# Patient Record
Sex: FEMALE | Race: WHITE | Employment: OTHER | URBAN - METROPOLITAN AREA
[De-identification: names, ages, dates, MRNs, and addresses within clinical notes are randomized per-mention and may not be internally consistent; named-entity substitution may affect disease eponyms.]

---

## 2017-01-16 ENCOUNTER — OFFICE VISIT (OUTPATIENT)
Dept: FAMILY MEDICINE CLINIC | Age: 72
End: 2017-01-16

## 2017-01-16 ENCOUNTER — TELEPHONE (OUTPATIENT)
Dept: FAMILY MEDICINE CLINIC | Age: 72
End: 2017-01-16

## 2017-01-16 VITALS
HEART RATE: 72 BPM | SYSTOLIC BLOOD PRESSURE: 104 MMHG | DIASTOLIC BLOOD PRESSURE: 60 MMHG | BODY MASS INDEX: 22.37 KG/M2 | WEIGHT: 138.6 LBS

## 2017-01-16 DIAGNOSIS — Z12.11 COLON CANCER SCREENING: ICD-10-CM

## 2017-01-16 DIAGNOSIS — M25.512 ACUTE PAIN OF LEFT SHOULDER: ICD-10-CM

## 2017-01-16 DIAGNOSIS — H91.92 DECREASED HEARING, LEFT: ICD-10-CM

## 2017-01-16 DIAGNOSIS — E11.9 CONTROLLED TYPE 2 DIABETES MELLITUS WITHOUT COMPLICATION, UNSPECIFIED LONG TERM INSULIN USE STATUS: Primary | ICD-10-CM

## 2017-01-16 DIAGNOSIS — R19.5 POSITIVE FIT (FECAL IMMUNOCHEMICAL TEST): ICD-10-CM

## 2017-01-16 DIAGNOSIS — E78.2 MIXED HYPERLIPIDEMIA: ICD-10-CM

## 2017-01-16 DIAGNOSIS — H90.5 SENSORINEURAL HEARING LOSS, UNSPECIFIED: Primary | ICD-10-CM

## 2017-01-16 DIAGNOSIS — E03.9 HYPOTHYROIDISM, UNSPECIFIED TYPE: ICD-10-CM

## 2017-01-16 LAB
ANION GAP SERPL CALCULATED.3IONS-SCNC: 18 MMOL/L (ref 3–16)
BUN BLDV-MCNC: 13 MG/DL (ref 7–20)
CALCIUM SERPL-MCNC: 10.2 MG/DL (ref 8.3–10.6)
CHLORIDE BLD-SCNC: 104 MMOL/L (ref 99–110)
CHOLESTEROL, TOTAL: 226 MG/DL (ref 0–199)
CO2: 25 MMOL/L (ref 21–32)
CREAT SERPL-MCNC: 0.7 MG/DL (ref 0.6–1.2)
CREATININE URINE: 93.6 MG/DL (ref 28–259)
GFR AFRICAN AMERICAN: >60
GFR NON-AFRICAN AMERICAN: >60
GLUCOSE BLD-MCNC: 138 MG/DL (ref 70–99)
HBA1C MFR BLD: 6.1 %
HDLC SERPL-MCNC: 70 MG/DL (ref 40–60)
LDL CHOLESTEROL CALCULATED: 118 MG/DL
MICROALBUMIN UR-MCNC: 1.8 MG/DL
MICROALBUMIN/CREAT UR-RTO: 19.2 MG/G (ref 0–30)
POTASSIUM SERPL-SCNC: 4.8 MMOL/L (ref 3.5–5.1)
SODIUM BLD-SCNC: 147 MMOL/L (ref 136–145)
T4 FREE: 1.1 NG/DL (ref 0.9–1.8)
TRIGL SERPL-MCNC: 189 MG/DL (ref 0–150)
TSH SERPL DL<=0.05 MIU/L-ACNC: 1.46 UIU/ML (ref 0.27–4.2)
VLDLC SERPL CALC-MCNC: 38 MG/DL

## 2017-01-16 PROCEDURE — 36415 COLL VENOUS BLD VENIPUNCTURE: CPT | Performed by: FAMILY MEDICINE

## 2017-01-16 PROCEDURE — 83036 HEMOGLOBIN GLYCOSYLATED A1C: CPT | Performed by: FAMILY MEDICINE

## 2017-01-16 PROCEDURE — 99214 OFFICE O/P EST MOD 30 MIN: CPT | Performed by: FAMILY MEDICINE

## 2017-01-16 ASSESSMENT — ENCOUNTER SYMPTOMS
SHORTNESS OF BREATH: 0
CONSTIPATION: 0
COUGH: 0
NAUSEA: 0
DIARRHEA: 0
ABDOMINAL PAIN: 0
EYE PAIN: 0

## 2017-01-30 LAB
CONTROL: ABNORMAL
HEMOCCULT STL QL: POSITIVE

## 2017-01-30 PROCEDURE — 82274 ASSAY TEST FOR BLOOD FECAL: CPT | Performed by: FAMILY MEDICINE

## 2017-02-10 ENCOUNTER — TELEPHONE (OUTPATIENT)
Dept: FAMILY MEDICINE CLINIC | Age: 72
End: 2017-02-10

## 2017-02-21 ENCOUNTER — HOSPITAL ENCOUNTER (OUTPATIENT)
Dept: SURGERY | Age: 72
Discharge: HOME | End: 2017-02-21
Attending: INTERNAL MEDICINE | Admitting: INTERNAL MEDICINE

## 2017-02-21 VITALS
OXYGEN SATURATION: 99 % | DIASTOLIC BLOOD PRESSURE: 74 MMHG | TEMPERATURE: 97.1 F | WEIGHT: 130 LBS | RESPIRATION RATE: 16 BRPM | HEIGHT: 66 IN | HEART RATE: 72 BPM | SYSTOLIC BLOOD PRESSURE: 116 MMHG | BODY MASS INDEX: 20.89 KG/M2

## 2017-02-21 LAB
GLUCOSE BLD-MCNC: 116 MG/DL (ref 70–99)
PERFORMED ON: ABNORMAL

## 2017-02-21 RX ORDER — SODIUM CHLORIDE, SODIUM LACTATE, POTASSIUM CHLORIDE, CALCIUM CHLORIDE 600; 310; 30; 20 MG/100ML; MG/100ML; MG/100ML; MG/100ML
INJECTION, SOLUTION INTRAVENOUS CONTINUOUS
Status: DISCONTINUED | OUTPATIENT
Start: 2017-02-21 | End: 2017-02-22 | Stop reason: HOSPADM

## 2017-02-21 RX ORDER — LIDOCAINE HYDROCHLORIDE 10 MG/ML
0.1 INJECTION, SOLUTION EPIDURAL; INFILTRATION; INTRACAUDAL; PERINEURAL ONCE
Status: DISCONTINUED | OUTPATIENT
Start: 2017-02-21 | End: 2017-02-22 | Stop reason: HOSPADM

## 2017-02-21 ASSESSMENT — PAIN SCALES - GENERAL
PAINLEVEL_OUTOF10: 0
PAINLEVEL_OUTOF10: 0

## 2017-02-21 ASSESSMENT — PAIN - FUNCTIONAL ASSESSMENT: PAIN_FUNCTIONAL_ASSESSMENT: 0-10

## 2017-03-01 LAB — DIABETIC RETINOPATHY: NORMAL

## 2017-03-15 DIAGNOSIS — E11.9 CONTROLLED TYPE 2 DIABETES MELLITUS WITHOUT COMPLICATION, UNSPECIFIED LONG TERM INSULIN USE STATUS: ICD-10-CM

## 2017-03-15 DIAGNOSIS — E78.2 MIXED HYPERLIPIDEMIA: ICD-10-CM

## 2017-03-15 DIAGNOSIS — F32.A DEPRESSION, UNSPECIFIED DEPRESSION TYPE: ICD-10-CM

## 2017-03-17 RX ORDER — SIMVASTATIN 40 MG
TABLET ORAL
Qty: 90 TABLET | Refills: 1 | Status: SHIPPED | OUTPATIENT
Start: 2017-03-17 | End: 2017-09-21 | Stop reason: SDUPTHER

## 2017-03-17 RX ORDER — SERTRALINE HYDROCHLORIDE 100 MG/1
TABLET, FILM COATED ORAL
Qty: 180 TABLET | Refills: 1 | Status: SHIPPED | OUTPATIENT
Start: 2017-03-17 | End: 2017-12-07 | Stop reason: SDUPTHER

## 2017-03-17 RX ORDER — LISINOPRIL 2.5 MG/1
TABLET ORAL
Qty: 90 TABLET | Refills: 1 | Status: SHIPPED | OUTPATIENT
Start: 2017-03-17 | End: 2017-09-21 | Stop reason: SDUPTHER

## 2017-05-09 ENCOUNTER — OFFICE VISIT (OUTPATIENT)
Dept: FAMILY MEDICINE CLINIC | Age: 72
End: 2017-05-09

## 2017-05-09 VITALS
DIASTOLIC BLOOD PRESSURE: 68 MMHG | WEIGHT: 133 LBS | HEART RATE: 76 BPM | SYSTOLIC BLOOD PRESSURE: 130 MMHG | BODY MASS INDEX: 21.47 KG/M2

## 2017-05-09 DIAGNOSIS — E11.9 CONTROLLED TYPE 2 DIABETES MELLITUS WITHOUT COMPLICATION, UNSPECIFIED LONG TERM INSULIN USE STATUS: Primary | ICD-10-CM

## 2017-05-09 DIAGNOSIS — F32.A DEPRESSION, UNSPECIFIED DEPRESSION TYPE: ICD-10-CM

## 2017-05-09 DIAGNOSIS — E78.2 MIXED HYPERLIPIDEMIA: ICD-10-CM

## 2017-05-09 DIAGNOSIS — E03.9 HYPOTHYROIDISM, UNSPECIFIED TYPE: ICD-10-CM

## 2017-05-09 LAB
ANION GAP SERPL CALCULATED.3IONS-SCNC: 18 MMOL/L (ref 3–16)
BUN BLDV-MCNC: 12 MG/DL (ref 7–20)
CALCIUM SERPL-MCNC: 9.3 MG/DL (ref 8.3–10.6)
CHLORIDE BLD-SCNC: 101 MMOL/L (ref 99–110)
CHOLESTEROL, TOTAL: 241 MG/DL (ref 0–199)
CO2: 24 MMOL/L (ref 21–32)
CREAT SERPL-MCNC: 0.6 MG/DL (ref 0.6–1.2)
GFR AFRICAN AMERICAN: >60
GFR NON-AFRICAN AMERICAN: >60
GLUCOSE BLD-MCNC: 147 MG/DL (ref 70–99)
HBA1C MFR BLD: 6.2 %
HDLC SERPL-MCNC: 75 MG/DL (ref 40–60)
LDL CHOLESTEROL CALCULATED: 145 MG/DL
POTASSIUM SERPL-SCNC: 4 MMOL/L (ref 3.5–5.1)
SODIUM BLD-SCNC: 143 MMOL/L (ref 136–145)
T4 FREE: 1.1 NG/DL (ref 0.9–1.8)
TRIGL SERPL-MCNC: 103 MG/DL (ref 0–150)
TSH SERPL DL<=0.05 MIU/L-ACNC: 1.77 UIU/ML (ref 0.27–4.2)
VLDLC SERPL CALC-MCNC: 21 MG/DL

## 2017-05-09 PROCEDURE — 83036 HEMOGLOBIN GLYCOSYLATED A1C: CPT | Performed by: FAMILY MEDICINE

## 2017-05-09 PROCEDURE — 36415 COLL VENOUS BLD VENIPUNCTURE: CPT | Performed by: FAMILY MEDICINE

## 2017-05-09 PROCEDURE — 99213 OFFICE O/P EST LOW 20 MIN: CPT | Performed by: FAMILY MEDICINE

## 2017-05-09 ASSESSMENT — PATIENT HEALTH QUESTIONNAIRE - PHQ9
SUM OF ALL RESPONSES TO PHQ9 QUESTIONS 1 & 2: 0
1. LITTLE INTEREST OR PLEASURE IN DOING THINGS: 0
2. FEELING DOWN, DEPRESSED OR HOPELESS: 0
SUM OF ALL RESPONSES TO PHQ QUESTIONS 1-9: 0

## 2017-05-30 ASSESSMENT — ENCOUNTER SYMPTOMS
ABDOMINAL PAIN: 0
SHORTNESS OF BREATH: 0

## 2017-06-08 DIAGNOSIS — E11.9 CONTROLLED TYPE 2 DIABETES MELLITUS WITHOUT COMPLICATION, UNSPECIFIED LONG TERM INSULIN USE STATUS: ICD-10-CM

## 2017-06-08 RX ORDER — METFORMIN HYDROCHLORIDE 500 MG/1
1000 TABLET, EXTENDED RELEASE ORAL 2 TIMES DAILY WITH MEALS
Qty: 360 TABLET | Refills: 1 | Status: SHIPPED | OUTPATIENT
Start: 2017-06-08 | End: 2017-12-07 | Stop reason: SDUPTHER

## 2017-06-21 DIAGNOSIS — M85.80 OSTEOPENIA: Primary | ICD-10-CM

## 2017-08-04 ENCOUNTER — TELEPHONE (OUTPATIENT)
Dept: FAMILY MEDICINE CLINIC | Age: 72
End: 2017-08-04

## 2017-08-15 ENCOUNTER — HOSPITAL ENCOUNTER (OUTPATIENT)
Dept: MAMMOGRAPHY | Age: 72
Discharge: HOME | End: 2017-08-15
Attending: INTERNAL MEDICINE | Admitting: INTERNAL MEDICINE

## 2017-08-15 DIAGNOSIS — C50.411 BREAST CANCER OF UPPER-OUTER QUADRANT OF RIGHT FEMALE BREAST (HCC): ICD-10-CM

## 2017-09-21 DIAGNOSIS — E11.9 CONTROLLED TYPE 2 DIABETES MELLITUS WITHOUT COMPLICATION, UNSPECIFIED LONG TERM INSULIN USE STATUS: ICD-10-CM

## 2017-09-21 DIAGNOSIS — E78.2 MIXED HYPERLIPIDEMIA: ICD-10-CM

## 2017-09-25 DIAGNOSIS — E03.9 HYPOTHYROIDISM, UNSPECIFIED TYPE: ICD-10-CM

## 2017-09-25 RX ORDER — SIMVASTATIN 40 MG
TABLET ORAL
Qty: 90 TABLET | Refills: 1 | Status: SHIPPED | OUTPATIENT
Start: 2017-09-25 | End: 2018-02-26 | Stop reason: SDUPTHER

## 2017-09-25 RX ORDER — LISINOPRIL 2.5 MG/1
TABLET ORAL
Qty: 90 TABLET | Refills: 1 | Status: SHIPPED | OUTPATIENT
Start: 2017-09-25 | End: 2018-02-26 | Stop reason: SDUPTHER

## 2017-09-28 RX ORDER — LEVOTHYROXINE SODIUM 0.1 MG/1
100 TABLET ORAL DAILY
Qty: 90 TABLET | Refills: 3 | Status: SHIPPED | OUTPATIENT
Start: 2017-09-28 | End: 2018-05-10 | Stop reason: SDUPTHER

## 2017-09-29 ENCOUNTER — OFFICE VISIT (OUTPATIENT)
Dept: ORTHOPEDIC SURGERY | Age: 72
End: 2017-09-29

## 2017-09-29 VITALS — BODY MASS INDEX: 20.09 KG/M2 | HEIGHT: 66 IN | WEIGHT: 125 LBS

## 2017-09-29 DIAGNOSIS — M79.641 HAND PAIN, RIGHT: Primary | ICD-10-CM

## 2017-09-29 DIAGNOSIS — S62.309A: ICD-10-CM

## 2017-09-29 PROCEDURE — 99213 OFFICE O/P EST LOW 20 MIN: CPT | Performed by: PHYSICIAN ASSISTANT

## 2017-10-02 ENCOUNTER — HOSPITAL ENCOUNTER (OUTPATIENT)
Dept: OCCUPATIONAL THERAPY | Age: 72
Discharge: HOME | End: 2017-10-31
Admitting: ORTHOPAEDIC SURGERY

## 2017-10-02 ENCOUNTER — OFFICE VISIT (OUTPATIENT)
Dept: ORTHOPEDIC SURGERY | Age: 72
End: 2017-10-02

## 2017-10-02 VITALS — WEIGHT: 125 LBS | BODY MASS INDEX: 20.09 KG/M2 | HEIGHT: 66 IN

## 2017-10-02 DIAGNOSIS — S62.336A CLOSED DISPLACED FRACTURE OF NECK OF FIFTH METACARPAL BONE OF RIGHT HAND, INITIAL ENCOUNTER: ICD-10-CM

## 2017-10-02 DIAGNOSIS — S62.619A AVULSION FRACTURE OF PROXIMAL PHALANX OF FINGER, CLOSED, INITIAL ENCOUNTER: ICD-10-CM

## 2017-10-02 DIAGNOSIS — S62.354A CLOSED NONDISPLACED FRACTURE OF SHAFT OF FOURTH METACARPAL BONE OF RIGHT HAND, INITIAL ENCOUNTER: Primary | ICD-10-CM

## 2017-10-02 PROCEDURE — 99213 OFFICE O/P EST LOW 20 MIN: CPT | Performed by: ORTHOPAEDIC SURGERY

## 2017-10-02 NOTE — PROGRESS NOTES
Assessment: New injury to the right hand with an evulsion fracture at the insertion of the radial collateral ligament of the right middle MP joint. #2 oblique fracture of the 4th metacarpal with slight shortening but no rotational deformity. #3 evulsion fracture the origin of the ulnar collateral ligament of the small finger MP joint    Treatment Plan: I think these fracture should heal nonoperatively. We are going to have therapy fabricate a forearm-based safe position splint for middle ring and small fingers. She may remove it for hygiene and gentle range of motion exercises we will follow-up in 2 weeks with repeat x-rays    Return in about 2 weeks (around 10/16/2017) for X-ray next visit. History of Present Illness  Jennifer Bello is a 71 y.o. female. Patient's very nice lady who fell down the stairs last week injuring her right hand. She came back into the office on Friday to have her splint rewrapped she had a fair amount of swelling and felt like her's dressing was too tight on her fingers. Review of Systems  Complete Review of Systems performed and is non-contributory except for what is noted in HPI. Vital Signs  Vitals:    10/02/17 1324   Weight: 125 lb (56.7 kg)   Height: 5' 6.14\" (1.68 m)     Body mass index is 20.09 kg/(m^2). Physical Exam  Constitutional:  Patient is well-nourished and demonstrates normal hygiene. Mental Status:  Patient is alert and oriented to person, place and time. Skin:  Intact, no rashes or lesions. Hand Examination: She has definite ecchymosis and swelling from the middle finger MP joint over to the ulnar side of the hand. She can only flex to about 40° at middle ring and small MP joints but there is no evident rotational deformity and the plane of the nail plates appears to be normal.    Neurologic exam shows no significant numbness or tingling. Vascular exam shows bruising but good capillary refill.     Additional Comments:     Additional

## 2017-10-02 NOTE — PLAN OF CARE
[facility]  Orthopaedics and Sports Rehabilitation, [facility]  ________________________________________________________________________    Patient: Agueda Kebede   :   MRN: [id number]  Referring Physician: Referring Practitioner: Herson Browne MD    Evaluation Date: 10/2/2017      Medical Diagnosis Information:  Diagnosis: O25.837L (ICD-10-CM) - Closed nondisplaced fracture of shaft of fourth metacarpal bone of right hand, S62.336A (ICD-10-CM) - Closed displaced fracture of neck of fifth metacarpal bone of right hand           Occupational Therapy Splint Certification Form  Dear Referring Practitioner: Herson Browne MD,  The following patient has been evaluated for occupational therapy services for fabrication of a R ulnar gutter splint. Insurance requires the referring physician to review the treatment plan. Please review the attached evaluation and/or summary of the patient's plan of care, and verify that you agree by signing the attached document and sending it back to our office. Plan of Care/Treatment to date:  [x] Fabrication of custom R ulnar gutter splint        [x] Instruction on splint use, care and wearing schedule        [x] Follow up as needed for splint modifications          Frequency/Duration:  [x] One time visit for splint fabrication and instructions. Follow up as needed for splint modifcations        [] Splint fabricated, patient to return for full evaluation.     Rehab Potential: [x] good [] fair  [] poor         SPLINT EVALUATION  Date: 10/2/2017  Name: Agueda Kebede            : 9/15/1907      Medical/Treatment Diagnosis Information:  Diagnosis: S62.354A (ICD-10-CM) - Closed nondisplaced fracture of shaft of fourth metacarpal bone of right hand, S62.336A (ICD-10-CM) - Closed displaced fracture of neck of fifth metacarpal bone of right hand     Insurance/Certification information:  OT Insurance Information: [facility] Medicare  Physician Information:  Referring Practitioner: Flako Fu MD       Next MD Appointment: 2 weeks    Subjective  History of Injury/ Mechanism of Injury: fell down the stairs on 9/28/17; seen by MD this afternoon and splint ordered   Surgery Date: NA  Dominant Hand:    [] Right [x]Left  Occupational/Vocational Status: retired   Progress of any previous OT/PT: the patient []has/ [x]has not received OT/PT previously for this diagnosis. Pain: 3-4/10    Objective Findings: Moderate edema R hand   Type of splint:   R ulnar gutter splint, IP's free (LF-SF)  Splint protocol utilization:   Full time  Splint Purpose: [x]Immobilize or protect [x]Promote healing of bone    [x]Relieve pain  []Provide support for improved hand function []Maximize joint motion    Treatment:   [x]Splint provided ([x]Customized/ []Prefabricated), and splint rationale explained. [x]Patient instructed in [x]wear/ [x]care of splint and educated regarding diagnosis. [x]Patient instructed in symptom reduction techniques   []HEP instruction    []Discussed ADL assistive device    Written Information Distributed: []HEP  [x]Splint care and wearing protocol    Patient response to evaluation and instructions:  [x]Attentive/interested   [x]Asked questions/ retained info  []Appeared disinterested  []Poor retention of information  []Appeared anxious/ fearful    Assessment and Plan:  Goals: [x]Patient will be able to verbalize rationale for, and demonstrate proper wearing     of splint. [x]Splint will provide proper fit and function. [x]Patient will be able to verbalize 2-3 ways to prevent further symptoms. []Patient will be able to don and doff independently. []Patient will be independent with HEP    Goals met:  [x]yes []no    Plan:  [x]Splint completed with good fit and function. Hand Therapy to follow up for     splint modifications as needed    []Splint completed; OT/PT evaluation initiated. Patient to return for further     treatment.     Mayra Hutson  OTR/L, PT, MPT, [address] [place] [address], [id number]

## 2017-10-03 NOTE — PROGRESS NOTES
Patient: León Jason    MRN: [id number]  YOB: 1945          Age: 71 y.o. Sex: female    Subjective     Chief Complaint:  Hand Pain (right hand)      History of Present Illness:  León Jason is a 71 y.o. female who presents tonight with her daughter for evaluation of right hand pain. Patient states that she fell down 10 carpeted steps landing onto her right hand on 9/28/17 landing impacting her right hand. She was seen at [facility] emergency room yesterday where x-rays were obtained that showed fractures of her 4th and 5th metacarpal.  Patient was placed into an ulnar gutter splint and referred to Dr. Laureen Saavedra. Patient presents to the clinic tonight complaining of increased pain within the splint. Medical History  Current Medications:   Current Outpatient Prescriptions   Medication Sig Dispense Refill    levothyroxine (SYNTHROID) 100 MCG tablet Take 1 tablet by mouth daily (or as otherwise directed). 90 tablet 3    HYDROcodone-acetaminophen (NORCO) 5-325 MG per tablet Take 1 tablet by mouth every 6 hours as needed for Pain . 10 tablet 0    simvastatin (ZOCOR) 40 MG tablet TAKE 1 TABLET BY MOUTH NIGHTLY 90 tablet 1    lisinopril (PRINIVIL;ZESTRIL) 2.5 MG tablet TAKE 1 TABLET BY MOUTH DAILY 90 tablet 1    metFORMIN (GLUCOPHAGE-XR) 500 MG extended release tablet Take 2 tablets by mouth 2 times daily (with meals) 360 tablet 1    anastrozole (ARIMIDEX) 1 MG tablet Take 1 tablet by mouth daily 90 tablet 3    sertraline (ZOLOFT) 100 MG tablet TAKE 2 TABLETS BY MOUTH EVERY DAY (OR AS DIRECTED). 180 tablet 1    IRON PO Take by mouth daily      MAGNESIUM PO Take by mouth daily      Probiotic Product (ALIGN) 4 MG CAPS   Take 4 mg by mouth Indications: as needed       polyethylene glycol (MIRALAX) PACK packet   Take 17 g by mouth as needed       multivitamin (THERAGRAN) per tablet Take 1 tablet by mouth daily.       famotidine (PEPCID) 20 MG tablet Take 1 tablet by mouth 2 times daily for 14 doses. 14 tablet 0    loratadine (CLARITIN) 10 MG tablet   Take 10 mg by mouth daily Indications: as needed       glucose blood VI test strips (ASCENSIA AUTODISC VI;ONE TOUCH ULTRA TEST VI) strip As needed. Testing qd 100 strip 3    aspirin 81 MG EC tablet Take 81 mg by mouth daily. No current facility-administered medications for this visit. Medical History:   Past Medical History:   Diagnosis Date    Allergic rhinitis     Cancer ([facility] [address] 11/2013    right breast    Depression     Hyperlipidemia     Hypertension     Hypothyroidism     Type II or unspecified type diabetes mellitus without mention of complication, not stated as uncontrolled     Unspecified sprain of left wrist, initial encounter 9/9/2016     Allergies: Allergies   Allergen Reactions    Ciprofloxacin Nausea Only    Naprosyn [Naproxen]      lymphadenopathy    Paxil [Paroxetine] Hives    Pravachol [Pravastatin Sodium]      Neck,shoulder pain    Prozac [Fluoxetine Hcl]      Headaches      Wellbutrin [Bupropion Hcl]      Tinnitus/ \"laser tag in brain\"    Amoxicillin Rash    Ampicillin Rash    Lipitor Rash    Sulfa Antibiotics Rash     Problem List:    Patient Active Problem List   Diagnosis    Diabetes mellitus type II, controlled ([facility] [address]    HLD (hyperlipidemia)    Depression    Hypothyroid    Breast cancer of upper-outer quadrant of right female breast ([facility] [address]    Closed fracture of right distal fibula    Elbow pain    Lateral epicondylitis (tennis elbow)    Pain in joint, upper arm    Lateral epicondylitis    Cervical radicular pain    Shoulder pain    Unspecified sprain of left wrist, initial encounter       Review of Systems:  All systems were reviewed on 9/29/2017 and were negative except as indicated on the ROS form attached to this encounter (or located in the Media tab).     Vital Signs:  Ht 5' 6.14\" (1.68 m)  Wt 125 lb (56.7 kg)  LMP 02/03/1997  BMI 20.09 kg/m2    General Exam:  Constitutional: Patient is adequately groomed with no evidence of malnutrition  Mental Status: The patient is oriented to time, place and person. The patient's mood and affect are appropriate. Neurological: The patient has good coordination. There is no weakness or sensory deficit. Right hand Examination:   Inspection: Today the ulnar gutter splint was removed and the skin was noted to be intact with mild distal edema into her fingers. Palpation: She is diffusely tender to palpation over the distal 4th and 5th metacarpal    Range of motion: All fingers have decreased range of motion but good capillary refill    Strength: There is no significant strength deficits noted upon testing    Special tests: Capillary refills within 2 seconds    Skin: There are no rashes, ulcerations or lesions      Radiology:  X-rays obtained and reviewed in office: X-rays from [facility] emergency room were reviewed that shows spiral fracture of the 5th metacarpal fracture with a slight displaced fracture of the 4th metacarpal.  Views:   Location(s):   Impression:     Assessment:  4th and 5th metacarpal fractures right hand    Impression:   Encounter Diagnoses   Name Primary?  Hand pain, right Yes    Multiple fractures of metacarpal bones, closed, initial encounter        Office Procedures:  No orders of the defined types were placed in this encounter. Treatment Plan:  Patient was already in a ulnar gutter splint that was taken off and rewrap. Patient is to continue icing and she may continue with the sling. She will follow-up next week with Dr. Jennifer Marquez. Zohaib Brown PA-C    * Please note that some or all of this record was generated using voice recognition software. If there are any questions about the content of this document, please contact me as some errors in transcription may have occurred.

## 2017-10-16 ENCOUNTER — OFFICE VISIT (OUTPATIENT)
Dept: ORTHOPEDIC SURGERY | Age: 72
End: 2017-10-16

## 2017-10-16 DIAGNOSIS — S62.336D CLOSED DISPLACED FRACTURE OF NECK OF FIFTH METACARPAL BONE OF RIGHT HAND WITH ROUTINE HEALING, SUBSEQUENT ENCOUNTER: ICD-10-CM

## 2017-10-16 DIAGNOSIS — S62.354D CLOSED NONDISPLACED FRACTURE OF SHAFT OF FOURTH METACARPAL BONE OF RIGHT HAND WITH ROUTINE HEALING, SUBSEQUENT ENCOUNTER: ICD-10-CM

## 2017-10-16 DIAGNOSIS — R52 PAIN: Primary | ICD-10-CM

## 2017-10-16 DIAGNOSIS — S62.619D CLOSED AVULSION FRACTURE OF PROXIMAL PHALANX OF FINGER WITH ROUTINE HEALING, SUBSEQUENT ENCOUNTER: ICD-10-CM

## 2017-10-16 PROCEDURE — 73130 X-RAY EXAM OF HAND: CPT | Performed by: ORTHOPAEDIC SURGERY

## 2017-10-16 PROCEDURE — 99213 OFFICE O/P EST LOW 20 MIN: CPT | Performed by: ORTHOPAEDIC SURGERY

## 2017-10-16 NOTE — PROGRESS NOTES
Assessment: Evulsion fracture of the insertion radial collateral ligament middle finger MP joint, oblique fracture 4th metacarpal, evulsion fracture origin ulnar collateral ligament right small finger MP joint. Patient is 2-1/2 weeks post injury or fractures remain in good position    Treatment Plan: I instructed her on gentle home range of motion exercises 2 or 3 times a day. She is going to continue using her safe position splint in between exercises    Return in about 2 weeks (around 10/30/2017) for X-ray next visit. History of Present Illness  Agueda Kebede is a 71 y.o. female. Very nice lady used 2-1/2 weeks post the above listed injuries. She has been in an Orthoplast splint and is overall doing very well with minimal discomfort    Review of Systems  Complete Review of Systems performed and is non-contributory except for what is noted in HPI. Vital Signs  There were no vitals filed for this visit. There is no height or weight on file to calculate BMI. Physical Exam  Constitutional:  Patient is well-nourished and demonstrates normal hygiene. Mental Status:  Patient is alert and oriented to person, place and time. Skin:  Intact, no rashes or lesions. Hand Examination: Range of motion of her fingers shows about 5° lack of full extension and she can flex to about 70° at the MP joints of middle ring and small fingers. Minimal shortening of the ring finger. No rotational deformities. .    Vascular exam shows normal capillary refill. Neurologic exam negative Tinel's and Phalen's no significant numbness or tingling. Additional Comments:     Additional Examinations:  X-Ray Findings:  PA lateral and oblique x-rays of the right hand show congruent alignment of the metacarpal phalangeal joints.   The 2 evulsion fractures remain in good position and the oblique fracture the metacarpal appears to be healing well  Additional Diagnostic Test Findings:    Office Procedures:    Orders Placed This Encounter   Procedures    XR HAND RIGHT (MIN 3 VIEWS)     [id number]     Order Specific Question:   Reason for exam:     Answer:   Hand Pain

## 2017-10-26 ENCOUNTER — OFFICE VISIT (OUTPATIENT)
Dept: ORTHOPEDIC SURGERY | Age: 72
End: 2017-10-26

## 2017-10-26 VITALS
HEIGHT: 66 IN | BODY MASS INDEX: 20.09 KG/M2 | HEART RATE: 95 BPM | DIASTOLIC BLOOD PRESSURE: 78 MMHG | WEIGHT: 125 LBS | SYSTOLIC BLOOD PRESSURE: 126 MMHG

## 2017-10-26 DIAGNOSIS — S42.291A OTHER CLOSED DISPLACED FRACTURE OF PROXIMAL END OF RIGHT HUMERUS, INITIAL ENCOUNTER: ICD-10-CM

## 2017-10-26 DIAGNOSIS — M25.511 RIGHT SHOULDER PAIN, UNSPECIFIED CHRONICITY: Primary | ICD-10-CM

## 2017-10-26 PROCEDURE — 3017F COLORECTAL CA SCREEN DOC REV: CPT | Performed by: ORTHOPAEDIC SURGERY

## 2017-10-26 PROCEDURE — 99214 OFFICE O/P EST MOD 30 MIN: CPT | Performed by: ORTHOPAEDIC SURGERY

## 2017-10-26 PROCEDURE — G8420 CALC BMI NORM PARAMETERS: HCPCS | Performed by: ORTHOPAEDIC SURGERY

## 2017-10-26 PROCEDURE — 1123F ACP DISCUSS/DSCN MKR DOCD: CPT | Performed by: ORTHOPAEDIC SURGERY

## 2017-10-26 PROCEDURE — 1090F PRES/ABSN URINE INCON ASSESS: CPT | Performed by: ORTHOPAEDIC SURGERY

## 2017-10-26 PROCEDURE — G8399 PT W/DXA RESULTS DOCUMENT: HCPCS | Performed by: ORTHOPAEDIC SURGERY

## 2017-10-26 PROCEDURE — L3670 SO ACRO/CLAV CAN WEB PRE OTS: HCPCS | Performed by: ORTHOPAEDIC SURGERY

## 2017-10-26 PROCEDURE — 1036F TOBACCO NON-USER: CPT | Performed by: ORTHOPAEDIC SURGERY

## 2017-10-26 PROCEDURE — G8484 FLU IMMUNIZE NO ADMIN: HCPCS | Performed by: ORTHOPAEDIC SURGERY

## 2017-10-26 PROCEDURE — 4040F PNEUMOC VAC/ADMIN/RCVD: CPT | Performed by: ORTHOPAEDIC SURGERY

## 2017-10-26 PROCEDURE — 3014F SCREEN MAMMO DOC REV: CPT | Performed by: ORTHOPAEDIC SURGERY

## 2017-10-26 PROCEDURE — G8427 DOCREV CUR MEDS BY ELIG CLIN: HCPCS | Performed by: ORTHOPAEDIC SURGERY

## 2017-10-26 NOTE — PROGRESS NOTES
Chief Complaint  New Patient (Follow up from ER for Rt Humerus Fracture 10/23/17) and Hand Pain (Previous injury she is seeing Dr. Kentrell Finch fell and has broken shaft of 4th metacarpal of right hand )      History of Present Illness:  Viridiana Mace is a 71 y.o. y/o female who presents today for evaluation of her right shoulder. On 10/23/17 she fell and sustained injury to her right shoulder. She went to the emergency department where she was evaluated and had a right proximal humerus fracture. She was placed in a sling and told to follow-up with orthopedics. She denies previous injury to this shoulder or surgery on the shoulder. She denies numbness and tingling. Of note she is recently been treated for a fourth metacarpal shaft fracture. She states he's having pain as radiating from her shoulder down her elbow, but no pain in the elbow itself. She denies new numbness and tingling. She has been taking narcotic medications that she has prescriptions for does not need a refill today. Occupation/activities: Not currently working    Medical History  Past Medical History:   Diagnosis Date    Allergic rhinitis     Cancer ([facility] [address] 11/2013    right breast    Depression     Hyperlipidemia     Hypertension     Hypothyroidism     Type II or unspecified type diabetes mellitus without mention of complication, not stated as uncontrolled     Unspecified sprain of left wrist, initial encounter 9/9/2016       Past Surgical History:   Procedure Laterality Date    BREAST SURGERY Right 12/9/2013    lumpectomy with sentinel node biopsy    COLONOSCOPY  02/21/2017    polyp, diverticulosis    THYROIDECTOMY  9/2004    thyroid CA  (papillary) - Dr. Sofia Green History     Social History    Marital status:      Spouse name: N/A    Number of children: N/A    Years of education: N/A     Occupational History    Not on file.      Social History Main Topics    Smoking status: Former Smoker     Packs/day: 1.50     Years: 10.00     Quit date: 1/1/1986    Smokeless tobacco: Never Used      Comment: Quit 2 times total    Alcohol use 0.0 - 1.2 oz/week      Comment: occas - 1-4/mo    Drug use: No    Sexual activity: Not Currently     Other Topics Concern    Not on file     Social History Narrative    No narrative on file       Family History   Problem Relation Age of Onset    Diabetes Daughter 5     FLORENTINO (Type I)    Cancer Daughter     Cancer Father         Review of Systems  Pertinent items are noted in HPI  Review of systems reviewed from Patient History Form dated on 10/26/17 and available in the patient's chart under the Media tab. Vital Signs  Vitals:    10/26/17 0821   BP: 126/78   Pulse: 95       General Exam:   Constitutional: Patient is adequately groomed with no evidence of malnutrition  Mental Status: The patient is oriented to time, place and person. The patient's mood and affect are appropriate. Lymphatic: The lymphatic examination bilaterally reveals all areas to be without enlargement or induration. Neurological: The patient has good coordination. There is no weakness or sensory deficit. Gait: normal    Right upper extremity Examination  Inspection:  No bruising or skin lesions    Palpation:  Tenderness globally around the shoulder, no tenderness over the clavicle or tenderness at the elbow    Range of Motion:  Elbow range of motion causes pain in the shoulder, but no elbow pain, shoulder range of motion not assessed    Sensation: In tact to light touch all nerve distributions     Strength: Motor exam appears intact C5 to T1, deltoid nerve appears to be working however limited exam secondary to pain    Special Tests:  None    Skin: There are no additional worrisome rashes, ulcerations or lesions. Circulation normal    Additional Examinations:  Left Upper Extremity: Examination of the left upper extremity does not show any tenderness, deformity or injury.   Range

## 2017-10-30 ENCOUNTER — OFFICE VISIT (OUTPATIENT)
Dept: ORTHOPEDIC SURGERY | Age: 72
End: 2017-10-30

## 2017-10-30 VITALS — HEIGHT: 66 IN | BODY MASS INDEX: 20.09 KG/M2 | WEIGHT: 125 LBS

## 2017-10-30 DIAGNOSIS — M79.641 PAIN OF RIGHT HAND: Primary | ICD-10-CM

## 2017-10-30 PROCEDURE — 4040F PNEUMOC VAC/ADMIN/RCVD: CPT | Performed by: ORTHOPAEDIC SURGERY

## 2017-10-30 PROCEDURE — G8420 CALC BMI NORM PARAMETERS: HCPCS | Performed by: ORTHOPAEDIC SURGERY

## 2017-10-30 PROCEDURE — G8428 CUR MEDS NOT DOCUMENT: HCPCS | Performed by: ORTHOPAEDIC SURGERY

## 2017-10-30 PROCEDURE — 99213 OFFICE O/P EST LOW 20 MIN: CPT | Performed by: ORTHOPAEDIC SURGERY

## 2017-10-30 PROCEDURE — 3014F SCREEN MAMMO DOC REV: CPT | Performed by: ORTHOPAEDIC SURGERY

## 2017-10-30 PROCEDURE — 1123F ACP DISCUSS/DSCN MKR DOCD: CPT | Performed by: ORTHOPAEDIC SURGERY

## 2017-10-30 PROCEDURE — G8399 PT W/DXA RESULTS DOCUMENT: HCPCS | Performed by: ORTHOPAEDIC SURGERY

## 2017-10-30 PROCEDURE — 1090F PRES/ABSN URINE INCON ASSESS: CPT | Performed by: ORTHOPAEDIC SURGERY

## 2017-10-30 PROCEDURE — G8484 FLU IMMUNIZE NO ADMIN: HCPCS | Performed by: ORTHOPAEDIC SURGERY

## 2017-10-30 PROCEDURE — 3017F COLORECTAL CA SCREEN DOC REV: CPT | Performed by: ORTHOPAEDIC SURGERY

## 2017-10-30 PROCEDURE — 1036F TOBACCO NON-USER: CPT | Performed by: ORTHOPAEDIC SURGERY

## 2017-11-01 ENCOUNTER — HOSPITAL ENCOUNTER (OUTPATIENT)
Dept: OCCUPATIONAL THERAPY | Age: 72
Discharge: HOME | End: 2017-11-30
Attending: ORTHOPAEDIC SURGERY | Admitting: ORTHOPAEDIC SURGERY

## 2017-11-02 ENCOUNTER — OFFICE VISIT (OUTPATIENT)
Dept: ORTHOPEDIC SURGERY | Age: 72
End: 2017-11-02

## 2017-11-02 VITALS
HEIGHT: 66 IN | SYSTOLIC BLOOD PRESSURE: 132 MMHG | HEART RATE: 83 BPM | BODY MASS INDEX: 20.09 KG/M2 | WEIGHT: 125 LBS | DIASTOLIC BLOOD PRESSURE: 84 MMHG

## 2017-11-02 DIAGNOSIS — M25.511 RIGHT SHOULDER PAIN, UNSPECIFIED CHRONICITY: Primary | ICD-10-CM

## 2017-11-02 DIAGNOSIS — S42.291D OTHER CLOSED DISPLACED FRACTURE OF PROXIMAL END OF RIGHT HUMERUS WITH ROUTINE HEALING, SUBSEQUENT ENCOUNTER: ICD-10-CM

## 2017-11-02 PROBLEM — S42.201D CLOSED FRACTURE OF PROXIMAL END OF RIGHT HUMERUS WITH ROUTINE HEALING: Status: ACTIVE | Noted: 2017-11-02

## 2017-11-02 PROCEDURE — G8484 FLU IMMUNIZE NO ADMIN: HCPCS | Performed by: ORTHOPAEDIC SURGERY

## 2017-11-02 PROCEDURE — 4040F PNEUMOC VAC/ADMIN/RCVD: CPT | Performed by: ORTHOPAEDIC SURGERY

## 2017-11-02 PROCEDURE — G8420 CALC BMI NORM PARAMETERS: HCPCS | Performed by: ORTHOPAEDIC SURGERY

## 2017-11-02 PROCEDURE — 3017F COLORECTAL CA SCREEN DOC REV: CPT | Performed by: ORTHOPAEDIC SURGERY

## 2017-11-02 PROCEDURE — 99212 OFFICE O/P EST SF 10 MIN: CPT | Performed by: ORTHOPAEDIC SURGERY

## 2017-11-02 PROCEDURE — 1123F ACP DISCUSS/DSCN MKR DOCD: CPT | Performed by: ORTHOPAEDIC SURGERY

## 2017-11-02 PROCEDURE — G8427 DOCREV CUR MEDS BY ELIG CLIN: HCPCS | Performed by: ORTHOPAEDIC SURGERY

## 2017-11-02 PROCEDURE — 3014F SCREEN MAMMO DOC REV: CPT | Performed by: ORTHOPAEDIC SURGERY

## 2017-11-02 PROCEDURE — G8399 PT W/DXA RESULTS DOCUMENT: HCPCS | Performed by: ORTHOPAEDIC SURGERY

## 2017-11-02 PROCEDURE — 1090F PRES/ABSN URINE INCON ASSESS: CPT | Performed by: ORTHOPAEDIC SURGERY

## 2017-11-02 PROCEDURE — 1036F TOBACCO NON-USER: CPT | Performed by: ORTHOPAEDIC SURGERY

## 2017-11-02 NOTE — PROGRESS NOTES
reviewed from Patient History Form dated on 10/26/17 and available in the patient's chart under the Media tab. Vital Signs  Vitals:    11/02/17 0924   BP: 132/84   Pulse: 83       General Exam:   Constitutional: Patient is adequately groomed with no evidence of malnutrition  Mental Status: The patient is oriented to time, place and person. The patient's mood and affect are appropriate. Lymphatic: The lymphatic examination bilaterally reveals all areas to be without enlargement or induration. Neurological: The patient has good coordination. There is no weakness or sensory deficit. Gait: Normal    Right shoulder  Examination  Inspection:  Positive bruising anterior aspect of shoulder    Palpation:  Mild to moderate tenderness globally around the proximal humerus and shoulder    Range of Motion:  Elbow range of motion 0120 with normal pronation and supination, shoulder range of motion not assessed    Sensation: In tact to light touch all nerve distributions     Strength: Motor intact C5 to T1 including axillary nerve    Special Tests:  None    Skin: There are no additional worrisome rashes, ulcerations or lesions. Circulation normal    Additional Examinations:  Left Upper Extremity: Examination of the left upper extremity does not show any tenderness, deformity or injury. Range of motion is unremarkable. There is no gross instability. There are no rashes, ulcerations or lesions.   Strength and tone are normal.      Radiology:     X-rays obtained and reviewed in office:  AP,Grashey, scapular Y, axillary 4 views of the right shoulder obtained today in office demonstrate a two-part proximal humerus fracture through the surgical neck with minimal displacement in good alignment unchanged in 1 week      Assessment:  71-year-old female with a right proximal humerus fracture, minimally displaced    Office Procedures:  Orders Placed This Encounter   Procedures    XR SHOULDER RIGHT (MIN 2 VIEWS)     [id number]

## 2017-11-08 ENCOUNTER — TELEPHONE (OUTPATIENT)
Dept: ORTHOPEDIC SURGERY | Age: 72
End: 2017-11-08

## 2017-11-08 DIAGNOSIS — S42.201D CLOSED FRACTURE OF PROXIMAL END OF RIGHT HUMERUS WITH ROUTINE HEALING, UNSPECIFIED FRACTURE MORPHOLOGY, SUBSEQUENT ENCOUNTER: Primary | ICD-10-CM

## 2017-11-08 NOTE — TELEPHONE ENCOUNTER
I contacted patient to advise, per [facility], that patient would qualify for a [address], not Home care; I contacted [facility]: [phone]) as recommended by [facility], and was advised that they do not bill insurance companies and have a rate of $22.00 per hour, patient states at this time she would like to hold off and will call back of wishes to proceed but feels she will be able to do it on her own.---DMD

## 2017-11-09 ENCOUNTER — OFFICE VISIT (OUTPATIENT)
Dept: FAMILY MEDICINE CLINIC | Age: 72
End: 2017-11-09

## 2017-11-09 VITALS
DIASTOLIC BLOOD PRESSURE: 78 MMHG | SYSTOLIC BLOOD PRESSURE: 102 MMHG | HEART RATE: 92 BPM | WEIGHT: 128.2 LBS | BODY MASS INDEX: 20.7 KG/M2

## 2017-11-09 DIAGNOSIS — F33.42 RECURRENT MAJOR DEPRESSIVE EPISODES, IN FULL REMISSION (HCC): ICD-10-CM

## 2017-11-09 DIAGNOSIS — E78.2 MIXED HYPERLIPIDEMIA: ICD-10-CM

## 2017-11-09 DIAGNOSIS — E11.9 CONTROLLED TYPE 2 DIABETES MELLITUS WITHOUT COMPLICATION, UNSPECIFIED LONG TERM INSULIN USE STATUS: Primary | ICD-10-CM

## 2017-11-09 DIAGNOSIS — M81.0 OSTEOPOROSIS WITHOUT CURRENT PATHOLOGICAL FRACTURE, UNSPECIFIED OSTEOPOROSIS TYPE: ICD-10-CM

## 2017-11-09 DIAGNOSIS — F32.A DEPRESSION, UNSPECIFIED DEPRESSION TYPE: ICD-10-CM

## 2017-11-09 DIAGNOSIS — E03.9 HYPOTHYROIDISM, UNSPECIFIED TYPE: ICD-10-CM

## 2017-11-09 LAB
ANION GAP SERPL CALCULATED.3IONS-SCNC: 15 MMOL/L (ref 3–16)
BUN BLDV-MCNC: 10 MG/DL (ref 7–20)
CALCIUM SERPL-MCNC: 10.1 MG/DL (ref 8.3–10.6)
CHLORIDE BLD-SCNC: 103 MMOL/L (ref 99–110)
CHOLESTEROL, TOTAL: 197 MG/DL (ref 0–199)
CO2: 27 MMOL/L (ref 21–32)
CREAT SERPL-MCNC: 0.6 MG/DL (ref 0.6–1.2)
GFR AFRICAN AMERICAN: >60
GFR NON-AFRICAN AMERICAN: >60
GLUCOSE BLD-MCNC: 130 MG/DL (ref 70–99)
HBA1C MFR BLD: 6 %
HDLC SERPL-MCNC: 65 MG/DL (ref 40–60)
LDL CHOLESTEROL CALCULATED: 102 MG/DL
POTASSIUM SERPL-SCNC: 4.5 MMOL/L (ref 3.5–5.1)
SODIUM BLD-SCNC: 145 MMOL/L (ref 136–145)
TRIGL SERPL-MCNC: 149 MG/DL (ref 0–150)
VITAMIN D 25-HYDROXY: 45 NG/ML
VLDLC SERPL CALC-MCNC: 30 MG/DL

## 2017-11-09 PROCEDURE — 3014F SCREEN MAMMO DOC REV: CPT | Performed by: FAMILY MEDICINE

## 2017-11-09 PROCEDURE — G8427 DOCREV CUR MEDS BY ELIG CLIN: HCPCS | Performed by: FAMILY MEDICINE

## 2017-11-09 PROCEDURE — G8420 CALC BMI NORM PARAMETERS: HCPCS | Performed by: FAMILY MEDICINE

## 2017-11-09 PROCEDURE — 83036 HEMOGLOBIN GLYCOSYLATED A1C: CPT | Performed by: FAMILY MEDICINE

## 2017-11-09 PROCEDURE — 99214 OFFICE O/P EST MOD 30 MIN: CPT | Performed by: FAMILY MEDICINE

## 2017-11-09 PROCEDURE — 1036F TOBACCO NON-USER: CPT | Performed by: FAMILY MEDICINE

## 2017-11-09 PROCEDURE — 4040F PNEUMOC VAC/ADMIN/RCVD: CPT | Performed by: FAMILY MEDICINE

## 2017-11-09 PROCEDURE — 36415 COLL VENOUS BLD VENIPUNCTURE: CPT | Performed by: FAMILY MEDICINE

## 2017-11-09 PROCEDURE — 1090F PRES/ABSN URINE INCON ASSESS: CPT | Performed by: FAMILY MEDICINE

## 2017-11-09 PROCEDURE — G8484 FLU IMMUNIZE NO ADMIN: HCPCS | Performed by: FAMILY MEDICINE

## 2017-11-09 PROCEDURE — 4005F PHARM THX FOR OP RXD: CPT | Performed by: FAMILY MEDICINE

## 2017-11-09 PROCEDURE — 3044F HG A1C LEVEL LT 7.0%: CPT | Performed by: FAMILY MEDICINE

## 2017-11-09 PROCEDURE — 3017F COLORECTAL CA SCREEN DOC REV: CPT | Performed by: FAMILY MEDICINE

## 2017-11-09 PROCEDURE — 1123F ACP DISCUSS/DSCN MKR DOCD: CPT | Performed by: FAMILY MEDICINE

## 2017-11-09 PROCEDURE — G8399 PT W/DXA RESULTS DOCUMENT: HCPCS | Performed by: FAMILY MEDICINE

## 2017-11-09 RX ORDER — ALENDRONATE SODIUM 70 MG/1
70 TABLET ORAL
Qty: 4 TABLET | Refills: 1 | Status: SHIPPED | OUTPATIENT
Start: 2017-11-09 | End: 2017-12-07 | Stop reason: SDUPTHER

## 2017-11-09 NOTE — PROGRESS NOTES
BP Readings from Last 2 Encounters:   11/09/17 102/78   11/02/17 132/84     Hemoglobin A1C (%)   Date Value   05/09/2017 6.2     Microscopic Examination (no units)   Date Value   11/26/2015 Yes     MICROALBUMIN, RANDOM URINE (mg/dL)   Date Value   01/16/2017 1.80     LDL Calculated (mg/dL)   Date Value   05/09/2017 145 (H)              Tobacco use:  Patient  reports that she quit smoking about 31 years ago. She has a 15.00 pack-year smoking history. She has never used smokeless tobacco.    If Smoker - Cessation materials given? NA    Is Daily aspirin on medication list?:  Yes    Diabetic retinal exam done? Yes   If yes, document in Health Maintenance. Monofilament placed on counter? Yes    Shoes and socks removed? Yes    BP taken with correct size cuff? Yes   Repeated if > 130/80 NA     Microalbumin performed if applicable?   No
neurologist if left hand tremor worsens, or if any definite new or worsening ambulation.

## 2017-11-10 ASSESSMENT — ENCOUNTER SYMPTOMS
ABDOMINAL PAIN: 0
NAUSEA: 0
EYE PAIN: 0
SHORTNESS OF BREATH: 0
COUGH: 0
VOMITING: 0

## 2017-11-10 NOTE — PATIENT INSTRUCTIONS
Diabetes and depression stable, no change to present medications. Since you have had to fractures from trip/falls, will treat you for \"functional\" osteoporosis. Discussed options regarding medications, and prescribed generic Fosamax weekly as directed. If vitamin D level is low, will plan to give you prescription supplemental vitamin D. Advise low-fat and low sweets diet, also gradually increase activity/exercise as able. If labs stable, sugars good, and overall feeling well, then repeat fasting office visit in 6 months, sooner as needed. Consider follow-up with neurologist if left hand tremors persist or worsen, or if any definite worsening of ambulation.

## 2017-11-20 ENCOUNTER — OFFICE VISIT (OUTPATIENT)
Dept: ORTHOPEDIC SURGERY | Age: 72
End: 2017-11-20

## 2017-11-20 DIAGNOSIS — M25.511 RIGHT SHOULDER PAIN, UNSPECIFIED CHRONICITY: Primary | ICD-10-CM

## 2017-11-20 DIAGNOSIS — S42.201D CLOSED FRACTURE OF PROXIMAL END OF RIGHT HUMERUS WITH ROUTINE HEALING, UNSPECIFIED FRACTURE MORPHOLOGY, SUBSEQUENT ENCOUNTER: ICD-10-CM

## 2017-11-20 PROCEDURE — 3017F COLORECTAL CA SCREEN DOC REV: CPT | Performed by: ORTHOPAEDIC SURGERY

## 2017-11-20 PROCEDURE — 4040F PNEUMOC VAC/ADMIN/RCVD: CPT | Performed by: ORTHOPAEDIC SURGERY

## 2017-11-20 PROCEDURE — G8420 CALC BMI NORM PARAMETERS: HCPCS | Performed by: ORTHOPAEDIC SURGERY

## 2017-11-20 PROCEDURE — 1036F TOBACCO NON-USER: CPT | Performed by: ORTHOPAEDIC SURGERY

## 2017-11-20 PROCEDURE — 1090F PRES/ABSN URINE INCON ASSESS: CPT | Performed by: ORTHOPAEDIC SURGERY

## 2017-11-20 PROCEDURE — 3014F SCREEN MAMMO DOC REV: CPT | Performed by: ORTHOPAEDIC SURGERY

## 2017-11-20 PROCEDURE — G8428 CUR MEDS NOT DOCUMENT: HCPCS | Performed by: ORTHOPAEDIC SURGERY

## 2017-11-20 PROCEDURE — 1123F ACP DISCUSS/DSCN MKR DOCD: CPT | Performed by: ORTHOPAEDIC SURGERY

## 2017-11-20 PROCEDURE — G8484 FLU IMMUNIZE NO ADMIN: HCPCS | Performed by: ORTHOPAEDIC SURGERY

## 2017-11-20 PROCEDURE — G8399 PT W/DXA RESULTS DOCUMENT: HCPCS | Performed by: ORTHOPAEDIC SURGERY

## 2017-11-20 PROCEDURE — 99212 OFFICE O/P EST SF 10 MIN: CPT | Performed by: ORTHOPAEDIC SURGERY

## 2017-11-27 ENCOUNTER — OFFICE VISIT (OUTPATIENT)
Dept: ORTHOPEDIC SURGERY | Age: 72
End: 2017-11-27

## 2017-11-27 DIAGNOSIS — M79.641 RIGHT HAND PAIN: Primary | ICD-10-CM

## 2017-11-27 DIAGNOSIS — S62.616D CLOSED DISPLACED FRACTURE OF PROXIMAL PHALANX OF RIGHT LITTLE FINGER WITH ROUTINE HEALING, SUBSEQUENT ENCOUNTER: ICD-10-CM

## 2017-11-27 DIAGNOSIS — S62.336D CLOSED DISPLACED FRACTURE OF NECK OF FIFTH METACARPAL BONE OF RIGHT HAND WITH ROUTINE HEALING, SUBSEQUENT ENCOUNTER: ICD-10-CM

## 2017-11-27 DIAGNOSIS — S62.354D CLOSED NONDISPLACED FRACTURE OF SHAFT OF FOURTH METACARPAL BONE OF RIGHT HAND WITH ROUTINE HEALING, SUBSEQUENT ENCOUNTER: ICD-10-CM

## 2017-11-27 PROCEDURE — G8428 CUR MEDS NOT DOCUMENT: HCPCS | Performed by: ORTHOPAEDIC SURGERY

## 2017-11-27 PROCEDURE — 3017F COLORECTAL CA SCREEN DOC REV: CPT | Performed by: ORTHOPAEDIC SURGERY

## 2017-11-27 PROCEDURE — G8484 FLU IMMUNIZE NO ADMIN: HCPCS | Performed by: ORTHOPAEDIC SURGERY

## 2017-11-27 PROCEDURE — G8420 CALC BMI NORM PARAMETERS: HCPCS | Performed by: ORTHOPAEDIC SURGERY

## 2017-11-27 PROCEDURE — G8399 PT W/DXA RESULTS DOCUMENT: HCPCS | Performed by: ORTHOPAEDIC SURGERY

## 2017-11-27 PROCEDURE — 1036F TOBACCO NON-USER: CPT | Performed by: ORTHOPAEDIC SURGERY

## 2017-11-27 PROCEDURE — 3014F SCREEN MAMMO DOC REV: CPT | Performed by: ORTHOPAEDIC SURGERY

## 2017-11-27 PROCEDURE — 1123F ACP DISCUSS/DSCN MKR DOCD: CPT | Performed by: ORTHOPAEDIC SURGERY

## 2017-11-27 PROCEDURE — 1090F PRES/ABSN URINE INCON ASSESS: CPT | Performed by: ORTHOPAEDIC SURGERY

## 2017-11-27 PROCEDURE — 4040F PNEUMOC VAC/ADMIN/RCVD: CPT | Performed by: ORTHOPAEDIC SURGERY

## 2017-11-27 PROCEDURE — 99213 OFFICE O/P EST LOW 20 MIN: CPT | Performed by: ORTHOPAEDIC SURGERY

## 2017-11-27 NOTE — PROGRESS NOTES
Assessment: Excellent healing of right 4th metacarpal fracture, evulsion fracture of the insertion of the radial collateral ligament of the right middle finger, an evulsion fracture the origin of the ulnar collateral ligament of the right small finger metacarpal phalangeal joint. Treatment Plan: At this point patient is going to switch doing a home exercise program and will return on an as-needed basis. Return if symptoms worsen or fail to improve. History of Present Illness  Paige Romero is a 71 y.o. female. Very nice lady returns in follow-up of the above 3 listed injuries. She initially had a tremendous amount of stiffness in this hand that she's been doing therapy and edema control with range of motion exercises and this is definitely improved. She can now make a full fist and has nearly full extension minimal pain. Pain she really does get is after a lot of activities where she had the evulsion fracture at the origin of the ulnar collateral ligament small finger MP joint    Review of Systems  Complete Review of Systems performed and is non-contributory except for what is noted in HPI. Vital Signs  There were no vitals filed for this visit. There is no height or weight on file to calculate BMI. Physical Exam  Constitutional:  Patient is well-nourished and demonstrates normal hygiene. Mental Status:  Patient is alert and oriented to person, place and time. Skin:  Intact, no rashes or lesions. Hand Examination: Range of motion is listed in her last therapy note but essentially lacks about 5° of full MP extension of the small finger has full flexion of all 3 digits. Slight shortening of the ring metacarpal    Additional Comments:     Additional Examinations:  X-Ray Findings:  PA lateral oblique x-rays of the right hand show fibrous union of the insertion of the radial collateral ligament.   Solid union of the oblique fracture the 4th metacarpal and apparent solid union of the evulsion fracture at the origin of the ulnar collateral ligament small finger MP joint  Additional Diagnostic Test Findings:    Office Procedures:    Orders Placed This Encounter   Procedures    XR HAND RIGHT (MIN 3 VIEWS)     02147     Order Specific Question:   Reason for exam:     Answer:   Hand Pain

## 2017-11-28 ENCOUNTER — HOSPITAL ENCOUNTER (OUTPATIENT)
Dept: PHYSICAL THERAPY | Age: 72
Discharge: HOME | End: 2017-11-30
Admitting: ORTHOPAEDIC SURGERY

## 2017-11-28 NOTE — FLOWSHEET NOTE
[facility], [address]  Phone: [phone]  Fax [phone]    Physical Therapy Daily Treatment Note  Date:  2017    Patient Name:  Mariann Perla    :    MRN: [id number]  Restrictions/Precautions:    Physician Information:  Referring Practitioner: Dr. Mu Holloway Early  Medical/Treatment Diagnosis Information:  · Diagnosis: right shoulder pain  M25.511 s/p right prox humerus fracture  DOI:  10/23/17   · Treatment Diagnosis: right shoulder pain  M25.511  [x] Conservative / [] Surgical - DOS:  Therapy Diagnosis/Practice Pattern:  Practice Pattern G: Fracture  Insurance/Certification information:  PT Insurance Information:  [employer]  - $40CP-100%-PT/OT MED NEC-NEEDS AUTH  Plan of care signed: [] YES  [] NO  Number of Comorbidities:  []0     [x]1-2    []3+  Date of Patient follow up with Physician:     G-Code (if applicable):      Date G-Code Applied:   17  PT G-Codes  Functional Assessment Tool Used: Andrew Nails  Score: 39%  Functional Limitation: Carrying, moving and handling objects  Carrying, Moving and Handling Objects Current Status (): At least 20 percent but less than 40 percent impaired, limited or restricted  Carrying, Moving and Handling Objects Goal Status ():  At least 1 percent but less than 20 percent impaired, limited or restricted    Progress Note: [x]  Yes  []  No  Next due by: Visit #10        Latex Allergy:  [x]NO      []YES  Preferred Language for Healthcare:   [x]English       []other:    Visit # Insurance Allowable Reporting Period   1 Med nec/  Needs auth Begin Date: 2017               End Date:      RECERT DUE BY:      SUBJECTIVE:  See eval    OBJECTIVE: See eval  Observation:  Palpation:     Test used Initial score Current Score   Pain Summary VAS 2    Functional questionnaire QDash 39    ROM flexion 126     ER 50 at 30     ABD 74     IR 60    Strength indicated by patients Functional Deficits. 4. Patient will return to reaching overhead cabinets with right UE without increased symptoms or restriction. 5. Able to lift and carry groceries   6. Able to perform TUG in less than 8 sec. Fall prevention    New or Updated Goals (if applicable):  [x] No change to goals established upon initial eval/last progress note:  New Goals:    Progression Towards Functional goals:   [] Patient is progressing as expected towards functional goals listed. [] Progression is slowed due to complexities listed. [] Progression has been slowed due to co-morbidities.   [x] Plan just implemented, too soon to assess goals progression  [] Other:     ASSESSMENT:    [] Improvement noted relative to goals:  [] No Improvement noted related to goals:  Summary/Patient's response to treatment: See Eval    Treatment/Activity Tolerance:  [x] Patient tolerated treatment well [] Patient limited by fatique  [] Patient limited by pain  [] Patient limited by other medical complications  [] Other:     Prognosis: [x] Good [] Fair  [] Poor    Patient Requires Follow-up: [x] Yes  [] No    PLAN: See eval  [] Continue per plan of care [] Alter current plan (see comments)  [x] Plan of care initiated [] Hold pending MD visit [] Discharge    Electronically signed by: Armando Armendariz PT

## 2017-11-28 NOTE — PLAN OF CARE
[facility], [address] [place] [address], [address] [place] [address]  Phone: [phone]  Fax [phone]     Physical Therapy Certification    Dear Referring Practitioner: Dr. Darnell Ayon,    We had the pleasure of evaluating the following patient for physical therapy services at [address] [place] [address]. A summary of our findings can be found in the initial assessment below. This includes our plan of care. If you have any questions or concerns regarding these findings, please do not hesitate to contact me at the office phone number checked above. Thank you for the referral.       Physician Signature:_______________________________Date:__________________  By signing above (or electronic signature), therapists plan is approved by physician    Patient: Mariann Perla   : [birth year]   MRN: [id number]  Referring Physician: Referring Practitioner: Dr. Mu Holloway Early      Evaluation Date: 2017      Medical Diagnosis Information:  Diagnosis: right shoulder pain  M25.511  s/p right prox humerus fracture  DOI:  10/23/17   Treatment Diagnosis: right shoulder pain  M25.511                                         Insurance information: PT Insurance Information:  [employer]  - $40CP-100%-PT/OT MED NEC-NEEDS AUTH    Precautions/ Contra-indications: history of CA thyroid and Breast,  DM - metformin, OA  Latex Allergy:  [x]NO      []YES  Preferred Language for Healthcare:   [x]English       []other:    SUBJECTIVE: Patient stated complaint: 2017- Pt fell on stairs and broke three bones in her right hand. Pt fell again in Oct and broke her shoulder when she tripped on her dog gate. Pt has had poor balance, but no falls in the past.    Pt went to ER. Pt was put in a sling. Followed up with  and was put in sling/swath. Pt has now graduated from sling. Pt is not taking any meds for her shoulder.    Pt gets some N/T in the right thumb and Patient requires intervention due to being higher risk   TUG score (>12s at risk):     [] Falls education provided, including       G-Codes:  PT G-Codes  Functional Assessment Tool Used: Quick Dash  Score: 39%  Functional Limitation: Carrying, moving and handling objects  Carrying, Moving and Handling Objects Current Status (): At least 20 percent but less than 40 percent impaired, limited or restricted  Carrying, Moving and Handling Objects Goal Status (): At least 1 percent but less than 20 percent impaired, limited or restricted    ASSESSMENT:   Functional Impairments   [x]Noted spinal or UE joint hypomobility   []Noted spinal or UE joint hypermobility   [x]Decreased UE functional ROM   [x]Decreased UE functional strength   []Abnormal reflexes/sensation/myotomal/dermatomal deficits   [x]Decreased RC/scapular/core strength and neuromuscular control   []other:      Functional Activity Limitations (from functional questionnaire and intake)   []Reduced ability to tolerate prolonged functional positions   [x]Reduced ability or difficulty with changes of positions or transfers between positions   [x]Reduced ability to maintain good posture and demonstrate good body mechanics with sitting, bending, and lifting   [x] Reduced ability or tolerance with driving and/or computer work   []Reduced ability to sleep   [x]Reduced ability to perform lifting, reaching, carrying tasks   [x]Reduced ability to tolerate impact through UE   []Reduced ability to reach behind back   [x]Reduced ability to  or hold objects   []Reduced ability to throw or toss an object   []other:    Participation Restrictions   [x]Reduced participation in self care activities   [x]Reduced participation in home management activities   []Reduced participation in work activities   []Reduced participation in social activities. []Reduced participation in sport/recreation activities.     Classification:   []Signs/symptoms consistent with minutes face-to-face)  [] EVAL (HIGH) 04259 (typically 45 minutes face-to-face)  [] RE-EVAL       PLAN:  Frequency/Duration:  2 days per week for 8 Weeks:  INTERVENTIONS:  [x] Therapeutic exercise including: strength training, ROM, for Upper extremity and core   [x]  NMR activation and proprioception for UE, scap and Core   [x] Manual therapy as indicated for shoulder, scapula and spine to include: Dry Needling/IASTM, STM, PROM, Gr I-IV mobilizations, manipulation. [x] Modalities as needed that may include: thermal agents, E-stim, Biofeedback, US, iontophoresis as indicated  [x] Patient education on joint protection, postural re-education, activity modification, progression of HEP. HEP instruction: (see scanned forms)    GOALS:  Patient stated goal: regain range of movement. Regain strength in right hand and arm. Therapist goals for Patient:   Short Term Goals: To be achieved in: 2 weeks  1. Independent in HEP and progression per patient tolerance, in order to prevent re-injury. 2. Patient will have a decrease in pain to facilitate improvement in movement, function, and ADLs as indicated by Functional Deficits. Long Term Goals: To be achieved in: 8 weeks  1. Disability index score of 15% or less for the [facility] to assist with reaching prior level of function. 2. Patient will demonstrate increased AROM of right shoulder to equal that of left to allow for proper joint functioning as indicated by patients Functional Deficits. 3. Patient will demonstrate an increase in Strength to 4+/5 throughout the right UE to allow for proper functional mobility as indicated by patients Functional Deficits. 4. Patient will return to reaching overhead cabinets with right UE without increased symptoms or restriction. 5. Able to lift and carry groceries   6. Able to perform TUG in less than 8 sec.   Fall prevention      Electronically signed by:  Anthony Collier PT

## 2017-12-01 ENCOUNTER — HOSPITAL ENCOUNTER (OUTPATIENT)
Dept: PHYSICAL THERAPY | Age: 72
Discharge: HOME | End: 2017-12-31
Attending: ORTHOPAEDIC SURGERY | Admitting: ORTHOPAEDIC SURGERY

## 2017-12-06 ENCOUNTER — HOSPITAL ENCOUNTER (OUTPATIENT)
Dept: PHYSICAL THERAPY | Age: 72
Discharge: HOME | End: 2017-12-06
Admitting: ORTHOPAEDIC SURGERY

## 2017-12-06 NOTE — FLOWSHEET NOTE
[facility], [address] [place] [address]  Phone: [phone]  Fax [phone]    Physical Therapy Daily Treatment Note  Date:  2017    Patient Name:  John Faustin    :    MRN: [id number]  Restrictions/Precautions:    Physician Information:  Referring Practitioner: Dr. Jade De Jesus Early  Medical/Treatment Diagnosis Information:  · Diagnosis: right shoulder pain  M25.511 s/p right prox humerus fracture  DOI:  10/23/17   · Treatment Diagnosis: right shoulder pain  M25.511  [x] Conservative / [] Surgical - DOS:  Therapy Diagnosis/Practice Pattern:  Practice Pattern G: Fracture  Insurance/Certification information:  PT Insurance Information:  [employer]  - $40CP-100%-PT/OT MED NEC-NEEDS AUTH  Plan of care signed: [] YES  [] NO  Number of Comorbidities:  []0     [x]1-2    []3+  Date of Patient follow up with Physician:     G-Code (if applicable):      Date G-Code Applied:   17  PT G-Codes  Functional Assessment Tool Used: Patricia Morales  Score: 39%  Functional Limitation: Carrying, moving and handling objects  Carrying, Moving and Handling Objects Current Status (): At least 20 percent but less than 40 percent impaired, limited or restricted  Carrying, Moving and Handling Objects Goal Status (): At least 1 percent but less than 20 percent impaired, limited or restricted    Progress Note: [x]  Yes  []  No  Next due by: Visit #10        Latex Allergy:  [x]NO      []YES  Preferred Language for Healthcare:   [x]English       []other:    Visit # Insurance Allowable Reporting Period   2 Med nec/  Needs auth Begin Date: 2017               End Date:      RECERT DUE BY:      SUBJECTIVE:  Cannot sleep on her right side. Shoulder just feels achy. Pt is pleased with ROM.      OBJECTIVE:   Observation:  Palpation:     Test used Initial score Current Score   Pain Summary VAS 2 2   Functional questionnaire QDash 39 ROM flexion 126     ER 50 at 30     ABD 74     IR 60    Strength flexion      ER 2-     ABD      IR 3       RESTRICTIONS/PRECAUTIONS: history of thyroid and breast CA, DM    Exercises/Interventions:   Therapeutic Ex Sets/reps Notes        shrug X 10 hep   SBS X 10 hep   No $ 2  X 10 hep   Table slides flex X 10 hep   Table slides ER X 10 hep   Wall walks X 10 Hep`   SL ERC 2 x 10  hep   Prone ext 2 x 10  hep   ER and IR iso at wall X 15  :05    TB row nv    Cane flex X 10     Supine R/S  2 directions. 0#  X 20     pulleys X 20                              Manual Intervention     PROM/  Joint osc 8 min. NMR re-education                                                 Therapeutic Exercise and NMR EXR  [x] (12351) Provided verbal/tactile cueing for activities related to strengthening, flexibility, endurance, ROM  for improvements in scapular, scapulothoracic and UE control with self care, reaching, carrying, lifting, house/yardwork, driving/computer work.    [] (56312) Provided verbal/tactile cueing for activities related to improving balance, coordination, kinesthetic sense, posture, motor skill, proprioception  to assist with  scapular, scapulothoracic and UE control with self care, reaching, carrying, lifting, house/yardwork, driving/computer work. Therapeutic Activities:    [] (55778 or 50870) Provided verbal/tactile cueing for activities related to improving balance, coordination, kinesthetic sense, posture, motor skill, proprioception and motor activation to allow for proper function of scapular, scapulothoracic and UE control with self care, carrying, lifting, driving/computer work.      Home Exercise Program:    [x] (67972) Reviewed/Progressed HEP activities related to strengthening, flexibility, endurance, ROM of scapular, scapulothoracic and UE control with self care, reaching, carrying, lifting, house/yardwork, driving/computer work  [] (81981) Reviewed/Progressed HEP activities related to improving balance, coordination, kinesthetic sense, posture, motor skill, proprioception of scapular, scapulothoracic and UE control with self care, reaching, carrying, lifting, house/yardwork, driving/computer work      Manual Treatments:  PROM / STM / Oscillations-Mobs:  G-I, II, III, IV (PA's, Inf., Post.)  [x] (86975) Provided manual therapy to mobilize soft tissue/joints of cervical/CT, scapular GHJ and UE for the purpose of modulating pain, promoting relaxation,  increasing ROM, reducing/eliminating soft tissue swelling/inflammation/restriction, improving soft tissue extensibility and allowing for proper ROM for normal function with self care, reaching, carrying, lifting, house/yardwork, driving/computer work    Modalities:  Ice x 15 min    Charges:  Timed Code Treatment Minutes: 40   Total Treatment Minutes: 55     [] EVAL (LOW) 61790 (typically 20 minutes face-to-face)  [] EVAL (MOD) 18047 (typically 30 minutes face-to-face)  [] EVAL (HIGH) 45918 (typically 45 minutes face-to-face)  [] RE-EVAL     [x] YE(15624) x  2   [] IONTO  [] NMR (61688) x      [] VASO  [x] Manual (11003) x  1    [x] Other: ice  [] TA x       [] Mech Traction (17367)  [] ES(attended) (38253)      [] ES (un) (85043):     GOALS:  Patient stated goal: regain range of movement. Regain strength in right hand and arm. Therapist goals for Patient:   Short Term Goals: To be achieved in: 2 weeks  1. Independent in HEP and progression per patient tolerance, in order to prevent re-injury. 2. Patient will have a decrease in pain to facilitate improvement in movement, function, and ADLs as indicated by Functional Deficits. Long Term Goals: To be achieved in: 8 weeks  1. Disability index score of 15% or less for the [facility] to assist with reaching prior level of function.    2. Patient will demonstrate increased AROM of right shoulder to equal that of left to allow for proper joint functioning as indicated by patients

## 2017-12-07 DIAGNOSIS — M81.0 OSTEOPOROSIS WITHOUT CURRENT PATHOLOGICAL FRACTURE, UNSPECIFIED OSTEOPOROSIS TYPE: ICD-10-CM

## 2017-12-07 DIAGNOSIS — E03.9 HYPOTHYROIDISM, UNSPECIFIED TYPE: ICD-10-CM

## 2017-12-07 DIAGNOSIS — F32.A DEPRESSION, UNSPECIFIED DEPRESSION TYPE: ICD-10-CM

## 2017-12-07 DIAGNOSIS — E78.2 MIXED HYPERLIPIDEMIA: ICD-10-CM

## 2017-12-07 DIAGNOSIS — E11.9 CONTROLLED TYPE 2 DIABETES MELLITUS WITHOUT COMPLICATION, UNSPECIFIED LONG TERM INSULIN USE STATUS: ICD-10-CM

## 2017-12-07 RX ORDER — SIMVASTATIN 40 MG
TABLET ORAL
Qty: 90 TABLET | Refills: 1 | Status: CANCELLED | OUTPATIENT
Start: 2017-12-07

## 2017-12-07 RX ORDER — LISINOPRIL 2.5 MG/1
TABLET ORAL
Qty: 90 TABLET | Refills: 1 | Status: CANCELLED | OUTPATIENT
Start: 2017-12-07

## 2017-12-07 RX ORDER — ALENDRONATE SODIUM 70 MG/1
70 TABLET ORAL
Qty: 4 TABLET | Refills: 1 | Status: CANCELLED | OUTPATIENT
Start: 2017-12-07

## 2017-12-07 RX ORDER — LEVOTHYROXINE SODIUM 0.1 MG/1
100 TABLET ORAL DAILY
Qty: 90 TABLET | Refills: 3 | Status: CANCELLED | OUTPATIENT
Start: 2017-12-07

## 2017-12-07 NOTE — TELEPHONE ENCOUNTER
Jairo Fulton is requesting refill(s)   Last OV 11/9/17 (pertaining to medication)  LR 3/17/17 (per medication requested)  Next office visit scheduled or attempted Yes   If no, reason:  Scheduled on 5/10/18

## 2017-12-11 RX ORDER — SERTRALINE HYDROCHLORIDE 100 MG/1
100 TABLET, FILM COATED ORAL DAILY
Qty: 180 TABLET | Refills: 1 | Status: SHIPPED | OUTPATIENT
Start: 2017-12-11 | End: 2018-07-10 | Stop reason: ALTCHOICE

## 2017-12-11 RX ORDER — METFORMIN HYDROCHLORIDE 500 MG/1
1000 TABLET, EXTENDED RELEASE ORAL 2 TIMES DAILY WITH MEALS
Qty: 360 TABLET | Refills: 1 | Status: SHIPPED | OUTPATIENT
Start: 2017-12-11 | End: 2018-05-10 | Stop reason: SDUPTHER

## 2017-12-11 RX ORDER — ALENDRONATE SODIUM 70 MG/1
70 TABLET ORAL
Qty: 12 TABLET | Refills: 3 | Status: SHIPPED | OUTPATIENT
Start: 2017-12-11 | End: 2018-05-10 | Stop reason: SDUPTHER

## 2017-12-13 ENCOUNTER — HOSPITAL ENCOUNTER (OUTPATIENT)
Dept: PHYSICAL THERAPY | Age: 72
Discharge: HOME | End: 2017-12-13
Admitting: ORTHOPAEDIC SURGERY

## 2017-12-13 NOTE — FLOWSHEET NOTE
[facility], [address]  Phone: [phone]  Fax [phone]    Physical Therapy Daily Treatment Note  Date:  2017    Patient Name:  Jolynn Friedman    :  [birth year]  MRN: [id number]  Restrictions/Precautions:    Physician Information:  Referring Practitioner: Dr. Artie Cabot Early  Medical/Treatment Diagnosis Information:  · Diagnosis: right shoulder pain  M25.511 s/p right prox humerus fracture  DOI:  10/23/17   · Treatment Diagnosis: right shoulder pain  M25.511  [x] Conservative / [] Surgical - DOS:  Therapy Diagnosis/Practice Pattern:  Practice Pattern G: Fracture  Insurance/Certification information:  PT Insurance Information:  [employer]  - $40CP-100%-PT/OT MED NEC-NEEDS AUTH  Plan of care signed: [] YES  [] NO  Number of Comorbidities:  []0     [x]1-2    []3+  Date of Patient follow up with Physician:     G-Code (if applicable):      Date G-Code Applied:   17  PT G-Codes  Functional Assessment Tool Used: Dat Daring  Score: 39%  Functional Limitation: Carrying, moving and handling objects  Carrying, Moving and Handling Objects Current Status (): At least 20 percent but less than 40 percent impaired, limited or restricted  Carrying, Moving and Handling Objects Goal Status (): At least 1 percent but less than 20 percent impaired, limited or restricted    Progress Note: [x]  Yes  []  No  Next due by: Visit #10        Latex Allergy:  [x]NO      []YES  Preferred Language for Healthcare:   [x]English       []other:    Visit # Insurance Allowable Reporting Period   3 Med nec/  Needs auth Begin Date: 2017               End Date:      RECERT DUE BY:      SUBJECTIVE:  Cannot sleep on her right side. Shoulder just feels achy. Pt is pleased with ROM.      OBJECTIVE:   Observation:  Palpation:     Test used Initial score Current Score   Pain Summary VAS 2 2   Functional questionnaire QDash 39 ROM flexion 126     ER 50 at 30     ABD 74     IR 60    Strength flexion      ER 2-     ABD      IR 3       RESTRICTIONS/PRECAUTIONS: history of thyroid and breast CA, DM    Exercises/Interventions:   Therapeutic Ex Sets/reps Notes        hep   SBS X 10 hep   No $ 2  X 10 hep   Table slides flex X 10 hep   Table slides ER X 10 hep   Wall walks X 10 Hep`   SL ERC 2 x 10  hep   Prone ext 2 x 10  hep   ER and IR iso at wall X 15  :05    TB row/  TB ext RD X 20/   Rd  X 20     Cane flex X 10     Supine R/S  4  directions. 0#  X 20  Had some popping in the post shoulder with flex and horiz abd.    pulleys X 20     Finisher 3 directions 4#  X 20                         Manual Intervention     PROM/  Joint osc 8 min. NMR re-education                                                 Therapeutic Exercise and NMR EXR  [x] (51894) Provided verbal/tactile cueing for activities related to strengthening, flexibility, endurance, ROM  for improvements in scapular, scapulothoracic and UE control with self care, reaching, carrying, lifting, house/yardwork, driving/computer work.    [] (71338) Provided verbal/tactile cueing for activities related to improving balance, coordination, kinesthetic sense, posture, motor skill, proprioception  to assist with  scapular, scapulothoracic and UE control with self care, reaching, carrying, lifting, house/yardwork, driving/computer work. Therapeutic Activities:    [] (64497 or 63842) Provided verbal/tactile cueing for activities related to improving balance, coordination, kinesthetic sense, posture, motor skill, proprioception and motor activation to allow for proper function of scapular, scapulothoracic and UE control with self care, carrying, lifting, driving/computer work.      Home Exercise Program:    [x] (97423) Reviewed/Progressed HEP activities related to strengthening, flexibility, endurance, ROM of scapular, scapulothoracic and UE control with self care, reaching, carrying, lifting, house/yardwork, driving/computer work  [] (95159) Reviewed/Progressed HEP activities related to improving balance, coordination, kinesthetic sense, posture, motor skill, proprioception of scapular, scapulothoracic and UE control with self care, reaching, carrying, lifting, house/yardwork, driving/computer work      Manual Treatments:  PROM / STM / Oscillations-Mobs:  G-I, II, III, IV (PA's, Inf., Post.)  [x] (46906) Provided manual therapy to mobilize soft tissue/joints of cervical/CT, scapular GHJ and UE for the purpose of modulating pain, promoting relaxation,  increasing ROM, reducing/eliminating soft tissue swelling/inflammation/restriction, improving soft tissue extensibility and allowing for proper ROM for normal function with self care, reaching, carrying, lifting, house/yardwork, driving/computer work    Modalities:  Ice x 15 min    Charges:  Timed Code Treatment Minutes: 40   Total Treatment Minutes: 55     [] EVAL (LOW) 04698 (typically 20 minutes face-to-face)  [] EVAL (MOD) 69053 (typically 30 minutes face-to-face)  [] EVAL (HIGH) 89298 (typically 45 minutes face-to-face)  [] RE-EVAL     [x] EN(28135) x  2   [] IONTO  [] NMR (30686) x      [] VASO  [x] Manual (35531) x  1    [x] Other: ice  [] TA x       [] Mech Traction (78505)  [] ES(attended) (89201)      [] ES (un) (79929):     GOALS:  Patient stated goal: regain range of movement. Regain strength in right hand and arm. Therapist goals for Patient:   Short Term Goals: To be achieved in: 2 weeks  1. Independent in HEP and progression per patient tolerance, in order to prevent re-injury. 2. Patient will have a decrease in pain to facilitate improvement in movement, function, and ADLs as indicated by Functional Deficits. Long Term Goals: To be achieved in: 8 weeks  1. Disability index score of 15% or less for the [facility] to assist with reaching prior level of function.    2. Patient will demonstrate

## 2017-12-20 ENCOUNTER — HOSPITAL ENCOUNTER (OUTPATIENT)
Dept: PHYSICAL THERAPY | Age: 72
Discharge: HOME | End: 2017-12-20
Admitting: ORTHOPAEDIC SURGERY

## 2017-12-20 NOTE — FLOWSHEET NOTE
[facility], [address] [place]  Phone: [phone]  Fax [phone]    Physical Therapy Daily Treatment Note  Date:  2017    Patient Name:  Corinne Simmons    :    MRN: [id number]  Restrictions/Precautions:    Physician Information:  Referring Practitioner: Dr. Egan Early  Medical/Treatment Diagnosis Information:  · Diagnosis: right shoulder pain  M25.511 s/p right prox humerus fracture  DOI:  10/23/17   · Treatment Diagnosis: right shoulder pain  M25.511  [x] Conservative / [] Surgical - DOS:  Therapy Diagnosis/Practice Pattern:  Practice Pattern G: Fracture  Insurance/Certification information:  PT Insurance Information:  HUMANA  - $40CP-100%-PT/OT MED NEC-NEEDS AUTH  Plan of care signed: [] YES  [] NO  Number of Comorbidities:  []0     [x]1-2    []3+  Date of Patient follow up with Physician:     G-Code (if applicable):      Date G-Code Applied:   17  PT G-Codes  Functional Assessment Tool Used: Abner Edwards  Score: 39%  Functional Limitation: Carrying, moving and handling objects  Carrying, Moving and Handling Objects Current Status (): At least 20 percent but less than 40 percent impaired, limited or restricted  Carrying, Moving and Handling Objects Goal Status (): At least 1 percent but less than 20 percent impaired, limited or restricted    Progress Note: [x]  Yes  []  No  Next due by: Visit #10        Latex Allergy:  [x]NO      []YES  Preferred Language for Healthcare:   [x]English       []other:    Visit # Insurance Allowable Reporting Period   4 Med nec/  Needs auth Begin Date: 2017               End Date:      RECERT DUE BY:      SUBJECTIVE:  Pt is limited from lifting by hand injury. Pt does feel shoulder motion is improving.      OBJECTIVE:   Observation:  Palpation:     Test used Initial score Current Score   Pain Summary VAS 2 2   Functional questionnaire QDash 39 self care, reaching, carrying, lifting, house/yardwork, driving/computer work  [] (75400) Reviewed/Progressed HEP activities related to improving balance, coordination, kinesthetic sense, posture, motor skill, proprioception of scapular, scapulothoracic and UE control with self care, reaching, carrying, lifting, house/yardwork, driving/computer work      Manual Treatments:  PROM / STM / Oscillations-Mobs:  G-I, II, III, IV (PA's, Inf., Post.)  [x] (35101) Provided manual therapy to mobilize soft tissue/joints of cervical/CT, scapular GHJ and UE for the purpose of modulating pain, promoting relaxation,  increasing ROM, reducing/eliminating soft tissue swelling/inflammation/restriction, improving soft tissue extensibility and allowing for proper ROM for normal function with self care, reaching, carrying, lifting, house/yardwork, driving/computer work    Modalities:  Ice x 15 min    Charges:  Timed Code Treatment Minutes: 40   Total Treatment Minutes: 55     [] EVAL (LOW) 08392 (typically 20 minutes face-to-face)  [] EVAL (MOD) 77037 (typically 30 minutes face-to-face)  [] EVAL (HIGH) 24400 (typically 45 minutes face-to-face)  [] RE-EVAL     [x] CQ(46105) x  2   [] IONTO  [] NMR (36101) x      [] VASO  [x] Manual (36028) x  1    [x] Other: ice  [] TA x       [] Mech Traction (20959)  [] ES(attended) (58891)      [] ES (un) (92783):     GOALS:  Patient stated goal: regain range of movement. Regain strength in right hand and arm. Therapist goals for Patient:   Short Term Goals: To be achieved in: 2 weeks  1. Independent in HEP and progression per patient tolerance, in order to prevent re-injury. 2. Patient will have a decrease in pain to facilitate improvement in movement, function, and ADLs as indicated by Functional Deficits. Long Term Goals: To be achieved in: 8 weeks  1. Disability index score of 15% or less for the [facility] to assist with reaching prior level of function.    2. Patient will demonstrate increased AROM of right shoulder to equal that of left to allow for proper joint functioning as indicated by patients Functional Deficits. 3. Patient will demonstrate an increase in Strength to 4+/5 throughout the right UE to allow for proper functional mobility as indicated by patients Functional Deficits. 4. Patient will return to reaching overhead cabinets with right UE without increased symptoms or restriction. 5. Able to lift and carry groceries   6. Able to perform TUG in less than 8 sec. Fall prevention    New or Updated Goals (if applicable):  [x] No change to goals established upon initial eval/last progress note:  New Goals:    Progression Towards Functional goals:   [x] Patient is progressing as expected towards functional goals listed. [] Progression is slowed due to complexities listed. [] Progression has been slowed due to co-morbidities.   [] Plan just implemented, too soon to assess goals progression  [] Other:     ASSESSMENT:    [] Improvement noted relative to goals:  [] No Improvement noted related to goals:  Summary/Patient's response to treatment: See Eval    Treatment/Activity Tolerance:  [x] Patient tolerated treatment well [] Patient limited by fatique  [] Patient limited by pain  [] Patient limited by other medical complications  [] Other:     Prognosis: [x] Good [] Fair  [] Poor    Patient Requires Follow-up: [x] Yes  [] No    PLAN: See eval  [x] Continue per plan of care [] Alter current plan (see comments)  [] Plan of care initiated [] Hold pending MD visit [] Discharge    Electronically signed by: Anthony Collier, PT

## 2017-12-27 ENCOUNTER — HOSPITAL ENCOUNTER (OUTPATIENT)
Dept: PHYSICAL THERAPY | Age: 72
Discharge: HOME | End: 2017-12-27
Admitting: ORTHOPAEDIC SURGERY

## 2017-12-27 NOTE — FLOWSHEET NOTE
[facility], [address]  Phone: [phone]  Fax [phone]    Physical Therapy Daily Treatment Note  Date:  2017    Patient Name:  Karena Crain    :    MRN: [id number]  Restrictions/Precautions:    Physician Information:  Referring Practitioner: Dr. Grisel Smith Early  Medical/Treatment Diagnosis Information:  · Diagnosis: right shoulder pain  M25.511 s/p right prox humerus fracture  DOI:  10/23/17   · Treatment Diagnosis: right shoulder pain  M25.511  [x] Conservative / [] Surgical - DOS:  Therapy Diagnosis/Practice Pattern:  Practice Pattern G: Fracture  Insurance/Certification information:  PT Insurance Information:  HUMANA  - $40CP-100%-PT/OT MED NEC-NEEDS AUTH  Plan of care signed: [] YES  [] NO  Number of Comorbidities:  []0     [x]1-2    []3+  Date of Patient follow up with Physician:     G-Code (if applicable):      Date G-Code Applied:   17  PT G-Codes  Functional Assessment Tool Used: Nubiada Ades  Score: 39%  Functional Limitation: Carrying, moving and handling objects  Carrying, Moving and Handling Objects Current Status (): At least 20 percent but less than 40 percent impaired, limited or restricted  Carrying, Moving and Handling Objects Goal Status (): At least 1 percent but less than 20 percent impaired, limited or restricted    Progress Note: [x]  Yes  []  No  Next due by: Visit #10        Latex Allergy:  [x]NO      []YES  Preferred Language for Healthcare:   [x]English       []other:    Visit # Insurance Allowable Reporting Period   5 Med nec/  Needs auth Begin Date: 2017               End Date:      RECERT DUE BY:      SUBJECTIVE:  Pt feels independent with HEP. Feels like her hand limits her more than her shoulder. Pt does find it difficult to find a comfortable position at night.      OBJECTIVE:   Observation:  Palpation:     Test used Initial score Current Score   Pain Summary VAS 2 0- 2   Functional questionnaire QDash 39 23   ROM flexion 126 151    ER 50 at 30 60 at 30 degrees of abd, and 60 at 60 degrees of abd    ABD 74     IR 60 65   Strength flexion      ER 2-     ABD      IR 3       RESTRICTIONS/PRECAUTIONS: history of thyroid and breast CA, DM    Exercises/Interventions:   Therapeutic Ex Sets/reps Notes        hep   SBS X 10 hep   No $ RD   3  X 10 hep   Table slides flex X 10 hep   Table slides ER X 10 hep   Wall walks X 10 Hep`   SL ERC 1#  3 x 10  hep   SL abd 1#  X 20     hep   Prone ext 2#   2 x 10  hep   ER and IR iso at wall X 15  :05    TB row/  TB ext RD X 20/   Rd X 20     Cane flex  / cane press X 10 /    5#   2 X 10  hep   Supine R/S  4  directions. 2#  X 20      pulleys X 20     Finisher 3 directions 4#  X 20                         Manual Intervention     PROM/  Joint osc 8 min. NMR re-education                                                 Therapeutic Exercise and NMR EXR  [x] (35689) Provided verbal/tactile cueing for activities related to strengthening, flexibility, endurance, ROM  for improvements in scapular, scapulothoracic and UE control with self care, reaching, carrying, lifting, house/yardwork, driving/computer work.    [] (67383) Provided verbal/tactile cueing for activities related to improving balance, coordination, kinesthetic sense, posture, motor skill, proprioception  to assist with  scapular, scapulothoracic and UE control with self care, reaching, carrying, lifting, house/yardwork, driving/computer work. Therapeutic Activities:    [] (03384 or 70939) Provided verbal/tactile cueing for activities related to improving balance, coordination, kinesthetic sense, posture, motor skill, proprioception and motor activation to allow for proper function of scapular, scapulothoracic and UE control with self care, carrying, lifting, driving/computer work.      Home Exercise Program:    [x]

## 2018-01-01 ENCOUNTER — HOSPITAL ENCOUNTER (OUTPATIENT)
Dept: PHYSICAL THERAPY | Age: 73
Discharge: HOME | End: 2018-01-31
Attending: ORTHOPAEDIC SURGERY | Admitting: ORTHOPAEDIC SURGERY

## 2018-01-04 ENCOUNTER — OFFICE VISIT (OUTPATIENT)
Dept: ORTHOPEDIC SURGERY | Age: 73
End: 2018-01-04

## 2018-01-04 VITALS — HEIGHT: 66 IN | BODY MASS INDEX: 20.62 KG/M2 | WEIGHT: 128.31 LBS

## 2018-01-04 DIAGNOSIS — M25.511 RIGHT SHOULDER PAIN, UNSPECIFIED CHRONICITY: Primary | ICD-10-CM

## 2018-01-04 DIAGNOSIS — S42.201D CLOSED FRACTURE OF PROXIMAL END OF RIGHT HUMERUS WITH ROUTINE HEALING, UNSPECIFIED FRACTURE MORPHOLOGY, SUBSEQUENT ENCOUNTER: ICD-10-CM

## 2018-01-04 PROCEDURE — 99212 OFFICE O/P EST SF 10 MIN: CPT | Performed by: ORTHOPAEDIC SURGERY

## 2018-01-04 PROCEDURE — 1090F PRES/ABSN URINE INCON ASSESS: CPT | Performed by: ORTHOPAEDIC SURGERY

## 2018-01-04 PROCEDURE — 3014F SCREEN MAMMO DOC REV: CPT | Performed by: ORTHOPAEDIC SURGERY

## 2018-01-04 PROCEDURE — G8399 PT W/DXA RESULTS DOCUMENT: HCPCS | Performed by: ORTHOPAEDIC SURGERY

## 2018-01-04 PROCEDURE — 4040F PNEUMOC VAC/ADMIN/RCVD: CPT | Performed by: ORTHOPAEDIC SURGERY

## 2018-01-04 PROCEDURE — 3017F COLORECTAL CA SCREEN DOC REV: CPT | Performed by: ORTHOPAEDIC SURGERY

## 2018-01-04 PROCEDURE — G8428 CUR MEDS NOT DOCUMENT: HCPCS | Performed by: ORTHOPAEDIC SURGERY

## 2018-01-04 PROCEDURE — 1123F ACP DISCUSS/DSCN MKR DOCD: CPT | Performed by: ORTHOPAEDIC SURGERY

## 2018-01-04 PROCEDURE — 1036F TOBACCO NON-USER: CPT | Performed by: ORTHOPAEDIC SURGERY

## 2018-01-04 PROCEDURE — G8420 CALC BMI NORM PARAMETERS: HCPCS | Performed by: ORTHOPAEDIC SURGERY

## 2018-01-04 PROCEDURE — G8484 FLU IMMUNIZE NO ADMIN: HCPCS | Performed by: ORTHOPAEDIC SURGERY

## 2018-01-04 RX ORDER — NICOTINE POLACRILEX 2 MG
GUM BUCCAL
COMMUNITY
End: 2018-05-10

## 2018-01-04 RX ORDER — TIZANIDINE 4 MG/1
4 TABLET ORAL
COMMUNITY
End: 2018-05-10 | Stop reason: ALTCHOICE

## 2018-01-04 RX ORDER — AZITHROMYCIN 250 MG/1
TABLET, FILM COATED ORAL
COMMUNITY
Start: 2015-03-27 | End: 2018-05-10

## 2018-01-04 RX ORDER — ESCITALOPRAM OXALATE 20 MG/1
20 TABLET ORAL
COMMUNITY
End: 2018-01-04 | Stop reason: DRUGHIGH

## 2018-01-04 RX ORDER — IBUPROFEN 600 MG/1
600 TABLET ORAL
COMMUNITY
Start: 2015-06-22 | End: 2018-01-04 | Stop reason: DRUGHIGH

## 2018-01-04 NOTE — PROGRESS NOTES
Chief Complaint  Follow-up (Right shoulder : Proximal humerus FX )      History of Present Illness:  Agustin Ryan is a 71 y.o. y/o female who presents today for follow up of her right shoulder. She sustained a right proximal humerus fracture on 10/23/17. She is doing about very well and only occasionally has some pain in her shoulder that radiates down her elbow. She's been doing physical therapy in is basically back to full function. Medical History  Past Medical History:   Diagnosis Date    Allergic rhinitis     Cancer ([address] 11/2013    right breast    Depression     Hyperlipidemia     Hypertension     Hypothyroidism     Type II or unspecified type diabetes mellitus without mention of complication, not stated as uncontrolled     Unspecified sprain of left wrist, initial encounter 9/9/2016       Past Surgical History:   Procedure Laterality Date    BREAST SURGERY Right 12/9/2013    lumpectomy with sentinel node biopsy    COLONOSCOPY  02/21/2017    polyp, diverticulosis    THYROIDECTOMY  9/2004    thyroid CA  (papillary) - Dr. Rosalee Bence History     Social History    Marital status:      Spouse name: N/A    Number of children: N/A    Years of education: N/A     Occupational History    Not on file.      Social History Main Topics    Smoking status: Former Smoker     Packs/day: 1.50     Years: 10.00     Quit date: 1/1/1986    Smokeless tobacco: Never Used      Comment: Quit 2 times total    Alcohol use 0.0 - 1.2 oz/week      Comment: occas - 1-4/mo    Drug use: No    Sexual activity: Not Currently     Other Topics Concern    Not on file     Social History Narrative    No narrative on file       Family History   Problem Relation Age of Onset    Diabetes Daughter 5     FLORENTINO (Type I)   [facility] Cancer Daughter     Cancer Father         Review of Systems  Pertinent items are noted in HPI  Review of systems reviewed from Patient History Form dated on 10/26/17 and available in the patient's chart under the Media tab. Vital Signs  There were no vitals filed for this visit. General Exam:   Constitutional: Patient is adequately groomed with no evidence of malnutrition  Mental Status: The patient is oriented to time, place and person. The patient's mood and affect are appropriate. Lymphatic: The lymphatic examination bilaterally reveals all areas to be without enlargement or induration. Neurological: The patient has good coordination. There is no weakness or sensory deficit. Gait: Normal    Right shoulder  Examination  Inspection:  No skin injury    Palpation: No tenderness to palpation    Range of Motion:  Elbow range of motion 0120 with normal pronation and supination, shoulder for elevation 170°, external rotation 40° the metric    Sensation: In tact to light touch all nerve distributions     Strength: Motor intact C5 to T1 including axillary nerve    Special Tests:  None    Skin: There are no additional worrisome rashes, ulcerations or lesions. Circulation normal    Additional Examinations:  Left Upper Extremity: Examination of the left upper extremity does not show any tenderness, deformity or injury. Range of motion is unremarkable. There is no gross instability. There are no rashes, ulcerations or lesions. Strength and tone are normal.      Radiology:     X-rays obtained and reviewed in office:  AP,Grashey, scapular Y, axillary 4 views of the right shoulder obtained today in office demonstrate a two-part proximal humerus fracture through the surgical neck with significant evidence of healing and stable alignment    Assessment:  78-year-old female with a right proximal humerus fracture, non operative treatment 10 weeks    Office Procedures:  Orders Placed This Encounter   Procedures    XR SHOULDER RIGHT (MIN 2 VIEWS)     84757     Order Specific Question:   Reason for exam:     Answer:   Pain       Plan:   1.   She can continue with physical therapy and activity as tolerated  2. She can take over-the-counter medications as needed for any pain  3. I'll see her back as needed going forward and if there are any issues the interim she should contact the office    Voice Recognition Dictation disclaimer: Please note that portions of this chart were generated using Dragon dictation software. Although every effort was made to ensure the accuracy of this automated transcription, some errors in transcription may have occurred.

## 2018-02-26 DIAGNOSIS — E78.2 MIXED HYPERLIPIDEMIA: ICD-10-CM

## 2018-02-26 DIAGNOSIS — E11.9 CONTROLLED TYPE 2 DIABETES MELLITUS WITHOUT COMPLICATION, UNSPECIFIED LONG TERM INSULIN USE STATUS: ICD-10-CM

## 2018-02-26 NOTE — TELEPHONE ENCOUNTER
Joe Velasquez is requesting refill(s)   Last OV 11/9/17 (pertaining to medication)  LR 9/25/17 (per medication requested)  Next office visit scheduled or attempted No   If no, reason:  Has one on 5/10/18

## 2018-02-28 RX ORDER — LISINOPRIL 2.5 MG/1
TABLET ORAL
Qty: 90 TABLET | Refills: 1 | Status: SHIPPED | OUTPATIENT
Start: 2018-02-28 | End: 2018-07-10

## 2018-02-28 RX ORDER — SIMVASTATIN 40 MG
TABLET ORAL
Qty: 90 TABLET | Refills: 1 | Status: SHIPPED | OUTPATIENT
Start: 2018-02-28 | End: 2018-09-27 | Stop reason: SDUPTHER

## 2018-05-10 ENCOUNTER — OFFICE VISIT (OUTPATIENT)
Dept: FAMILY MEDICINE CLINIC | Age: 73
End: 2018-05-10

## 2018-05-10 VITALS — BODY MASS INDEX: 22.09 KG/M2 | SYSTOLIC BLOOD PRESSURE: 106 MMHG | WEIGHT: 136.8 LBS | DIASTOLIC BLOOD PRESSURE: 66 MMHG

## 2018-05-10 DIAGNOSIS — M79.644 FINGER PAIN, RIGHT: ICD-10-CM

## 2018-05-10 DIAGNOSIS — E11.9 CONTROLLED TYPE 2 DIABETES MELLITUS WITHOUT COMPLICATION, WITHOUT LONG-TERM CURRENT USE OF INSULIN (HCC): Primary | ICD-10-CM

## 2018-05-10 DIAGNOSIS — E78.2 MIXED HYPERLIPIDEMIA: ICD-10-CM

## 2018-05-10 DIAGNOSIS — E03.9 HYPOTHYROIDISM, UNSPECIFIED TYPE: ICD-10-CM

## 2018-05-10 DIAGNOSIS — M81.0 OSTEOPOROSIS, UNSPECIFIED OSTEOPOROSIS TYPE, UNSPECIFIED PATHOLOGICAL FRACTURE PRESENCE: ICD-10-CM

## 2018-05-10 DIAGNOSIS — F33.1 MODERATE EPISODE OF RECURRENT MAJOR DEPRESSIVE DISORDER (HCC): ICD-10-CM

## 2018-05-10 LAB
ANION GAP SERPL CALCULATED.3IONS-SCNC: 16 MMOL/L (ref 3–16)
BUN BLDV-MCNC: 12 MG/DL (ref 7–20)
CALCIUM SERPL-MCNC: 9.2 MG/DL (ref 8.3–10.6)
CHLORIDE BLD-SCNC: 102 MMOL/L (ref 99–110)
CHOLESTEROL, TOTAL: 199 MG/DL (ref 0–199)
CO2: 26 MMOL/L (ref 21–32)
CREAT SERPL-MCNC: 0.7 MG/DL (ref 0.6–1.2)
CREATININE URINE: 66.7 MG/DL (ref 28–259)
GFR AFRICAN AMERICAN: >60
GFR NON-AFRICAN AMERICAN: >60
GLUCOSE BLD-MCNC: 147 MG/DL (ref 70–99)
HBA1C MFR BLD: 6.5 %
HDLC SERPL-MCNC: 63 MG/DL (ref 40–60)
LDL CHOLESTEROL CALCULATED: 112 MG/DL
MICROALBUMIN UR-MCNC: <1.2 MG/DL
MICROALBUMIN/CREAT UR-RTO: NORMAL MG/G (ref 0–30)
POTASSIUM SERPL-SCNC: 3.9 MMOL/L (ref 3.5–5.1)
SODIUM BLD-SCNC: 144 MMOL/L (ref 136–145)
T4 FREE: 1.5 NG/DL (ref 0.9–1.8)
TRIGL SERPL-MCNC: 122 MG/DL (ref 0–150)
TSH SERPL DL<=0.05 MIU/L-ACNC: 0.25 UIU/ML (ref 0.27–4.2)
VLDLC SERPL CALC-MCNC: 24 MG/DL

## 2018-05-10 PROCEDURE — 36415 COLL VENOUS BLD VENIPUNCTURE: CPT | Performed by: FAMILY MEDICINE

## 2018-05-10 PROCEDURE — 3017F COLORECTAL CA SCREEN DOC REV: CPT | Performed by: FAMILY MEDICINE

## 2018-05-10 PROCEDURE — 1123F ACP DISCUSS/DSCN MKR DOCD: CPT | Performed by: FAMILY MEDICINE

## 2018-05-10 PROCEDURE — 83036 HEMOGLOBIN GLYCOSYLATED A1C: CPT | Performed by: FAMILY MEDICINE

## 2018-05-10 PROCEDURE — G8399 PT W/DXA RESULTS DOCUMENT: HCPCS | Performed by: FAMILY MEDICINE

## 2018-05-10 PROCEDURE — G8420 CALC BMI NORM PARAMETERS: HCPCS | Performed by: FAMILY MEDICINE

## 2018-05-10 PROCEDURE — 3044F HG A1C LEVEL LT 7.0%: CPT | Performed by: FAMILY MEDICINE

## 2018-05-10 PROCEDURE — G8427 DOCREV CUR MEDS BY ELIG CLIN: HCPCS | Performed by: FAMILY MEDICINE

## 2018-05-10 PROCEDURE — 2022F DILAT RTA XM EVC RTNOPTHY: CPT | Performed by: FAMILY MEDICINE

## 2018-05-10 PROCEDURE — 99214 OFFICE O/P EST MOD 30 MIN: CPT | Performed by: FAMILY MEDICINE

## 2018-05-10 PROCEDURE — 4040F PNEUMOC VAC/ADMIN/RCVD: CPT | Performed by: FAMILY MEDICINE

## 2018-05-10 PROCEDURE — 1036F TOBACCO NON-USER: CPT | Performed by: FAMILY MEDICINE

## 2018-05-10 PROCEDURE — 1090F PRES/ABSN URINE INCON ASSESS: CPT | Performed by: FAMILY MEDICINE

## 2018-05-10 RX ORDER — SIMVASTATIN 40 MG
TABLET ORAL
Qty: 90 TABLET | Refills: 1 | Status: CANCELLED | OUTPATIENT
Start: 2018-05-10

## 2018-05-10 RX ORDER — LISINOPRIL 2.5 MG/1
TABLET ORAL
Qty: 90 TABLET | Refills: 1 | Status: CANCELLED | OUTPATIENT
Start: 2018-05-10

## 2018-05-10 RX ORDER — LEVOTHYROXINE SODIUM 0.1 MG/1
100 TABLET ORAL DAILY
Qty: 90 TABLET | Refills: 3 | Status: SHIPPED | OUTPATIENT
Start: 2018-05-10 | End: 2018-09-27 | Stop reason: SDUPTHER

## 2018-05-10 RX ORDER — ALENDRONATE SODIUM 70 MG/1
70 TABLET ORAL
Qty: 12 TABLET | Refills: 3 | Status: SHIPPED | OUTPATIENT
Start: 2018-05-10 | End: 2018-09-27 | Stop reason: SDUPTHER

## 2018-05-10 RX ORDER — DULOXETIN HYDROCHLORIDE 60 MG/1
60 CAPSULE, DELAYED RELEASE ORAL DAILY
Qty: 30 CAPSULE | Refills: 1 | Status: SHIPPED | OUTPATIENT
Start: 2018-05-10 | End: 2018-08-01 | Stop reason: SDUPTHER

## 2018-05-10 RX ORDER — DULOXETIN HYDROCHLORIDE 30 MG/1
30 CAPSULE, DELAYED RELEASE ORAL DAILY
Qty: 30 CAPSULE | Refills: 0 | Status: SHIPPED | OUTPATIENT
Start: 2018-05-10 | End: 2018-07-10 | Stop reason: DRUGHIGH

## 2018-05-10 RX ORDER — SERTRALINE HYDROCHLORIDE 100 MG/1
100 TABLET, FILM COATED ORAL DAILY
Qty: 180 TABLET | Refills: 1 | Status: CANCELLED | OUTPATIENT
Start: 2018-05-10

## 2018-05-10 RX ORDER — MELOXICAM 15 MG/1
15 TABLET ORAL DAILY
COMMUNITY
End: 2020-04-30

## 2018-05-10 RX ORDER — METFORMIN HYDROCHLORIDE 500 MG/1
1000 TABLET, EXTENDED RELEASE ORAL 2 TIMES DAILY WITH MEALS
Qty: 360 TABLET | Refills: 1 | Status: SHIPPED | OUTPATIENT
Start: 2018-05-10 | End: 2018-07-10 | Stop reason: DRUGHIGH

## 2018-05-11 PROBLEM — M81.0 OSTEOPOROSIS: Status: ACTIVE | Noted: 2018-05-11

## 2018-05-11 PROBLEM — F33.1 MODERATE EPISODE OF RECURRENT MAJOR DEPRESSIVE DISORDER (HCC): Status: ACTIVE | Noted: 2018-05-11

## 2018-05-11 ASSESSMENT — ENCOUNTER SYMPTOMS
EYE PAIN: 0
DIARRHEA: 0
COUGH: 0
NAUSEA: 0
SHORTNESS OF BREATH: 0
ABDOMINAL PAIN: 0

## 2018-07-10 ENCOUNTER — OFFICE VISIT (OUTPATIENT)
Dept: FAMILY MEDICINE CLINIC | Age: 73
End: 2018-07-10

## 2018-07-10 VITALS
SYSTOLIC BLOOD PRESSURE: 136 MMHG | BODY MASS INDEX: 21.28 KG/M2 | DIASTOLIC BLOOD PRESSURE: 72 MMHG | HEART RATE: 106 BPM | OXYGEN SATURATION: 98 % | WEIGHT: 132.4 LBS | HEIGHT: 66 IN

## 2018-07-10 DIAGNOSIS — F32.A DEPRESSION, UNSPECIFIED DEPRESSION TYPE: Primary | ICD-10-CM

## 2018-07-10 DIAGNOSIS — Z12.11 COLON CANCER SCREENING: ICD-10-CM

## 2018-07-10 DIAGNOSIS — E11.9 CONTROLLED TYPE 2 DIABETES MELLITUS WITHOUT COMPLICATION, UNSPECIFIED LONG TERM INSULIN USE STATUS: ICD-10-CM

## 2018-07-10 LAB — HBA1C MFR BLD: 5.8 %

## 2018-07-10 PROCEDURE — 3017F COLORECTAL CA SCREEN DOC REV: CPT | Performed by: PHYSICIAN ASSISTANT

## 2018-07-10 PROCEDURE — G8427 DOCREV CUR MEDS BY ELIG CLIN: HCPCS | Performed by: PHYSICIAN ASSISTANT

## 2018-07-10 PROCEDURE — 2022F DILAT RTA XM EVC RTNOPTHY: CPT | Performed by: PHYSICIAN ASSISTANT

## 2018-07-10 PROCEDURE — 99213 OFFICE O/P EST LOW 20 MIN: CPT | Performed by: PHYSICIAN ASSISTANT

## 2018-07-10 PROCEDURE — G8399 PT W/DXA RESULTS DOCUMENT: HCPCS | Performed by: PHYSICIAN ASSISTANT

## 2018-07-10 PROCEDURE — G8420 CALC BMI NORM PARAMETERS: HCPCS | Performed by: PHYSICIAN ASSISTANT

## 2018-07-10 PROCEDURE — 83036 HEMOGLOBIN GLYCOSYLATED A1C: CPT | Performed by: PHYSICIAN ASSISTANT

## 2018-07-10 PROCEDURE — 1123F ACP DISCUSS/DSCN MKR DOCD: CPT | Performed by: PHYSICIAN ASSISTANT

## 2018-07-10 PROCEDURE — 4040F PNEUMOC VAC/ADMIN/RCVD: CPT | Performed by: PHYSICIAN ASSISTANT

## 2018-07-10 PROCEDURE — 1090F PRES/ABSN URINE INCON ASSESS: CPT | Performed by: PHYSICIAN ASSISTANT

## 2018-07-10 PROCEDURE — 3044F HG A1C LEVEL LT 7.0%: CPT | Performed by: PHYSICIAN ASSISTANT

## 2018-07-10 PROCEDURE — 1036F TOBACCO NON-USER: CPT | Performed by: PHYSICIAN ASSISTANT

## 2018-07-10 RX ORDER — METFORMIN HYDROCHLORIDE 500 MG/1
500 TABLET, EXTENDED RELEASE ORAL 2 TIMES DAILY
Qty: 360 TABLET | Refills: 1 | Status: SHIPPED
Start: 2018-07-10 | End: 2018-09-27 | Stop reason: SDUPTHER

## 2018-07-10 NOTE — PROGRESS NOTES
BP Readings from Last 2 Encounters:   05/10/18 106/66   11/09/17 102/78     Hemoglobin A1C (%)   Date Value   05/10/2018 6.5     Microscopic Examination (no units)   Date Value   11/26/2015 Yes     Microalbumin, Random Urine (mg/dL)   Date Value   05/10/2018 <1.20     LDL Calculated (mg/dL)   Date Value   05/10/2018 112 (H)              Tobacco use:  Patient  reports that she quit smoking about 32 years ago. She has a 15.00 pack-year smoking history. She has never used smokeless tobacco.    If Smoker - Cessation materials given? No    Is Daily aspirin on medication list?:  Yes    Diabetic retinal exam done? Yes   If yes, document in Health Maintenance. Monofilament placed on counter? Yes    Shoes and socks removed? Yes    BP taken with correct size cuff? Yes   Repeated if > 140/90 NA     Is patient taking any medications for diabetes    Yes   If yes, see medication list    Is the patient reporting any side effects of diabetic medications? No    Microalbumin performed if applicable?   5/10/18      Is patient taking any over the counter medications    Yes   If yes, see medication list
98%   BMI 21.38 kg/m²     Exam: heart sounds normal rate, regular rhythm, normal S1, S2, no murmurs, rubs, clicks or gallops, chest clear, no hepatosplenomegaly, no carotid bruits    ASSESSMENT:   Diagnosis Orders   1. Depression, unspecified depression type     2. Controlled type 2 diabetes mellitus without complication, unspecified long term insulin use status (HCC)  POCT glycosylated hemoglobin (Hb A1C)   3. Colon cancer screening  POCT Fecal Immunochemical Test (FIT)         PLAN:   A1c is down to 5.8. Will cut her metformin down to 500 mg bid. Will also start taking her thyroid medication 6 days weekly. Continue the cymbalta. Will recheck labs in 6 months.

## 2018-07-16 LAB
CONTROL: NORMAL
HEMOCCULT STL QL: NEGATIVE

## 2018-07-16 PROCEDURE — 82274 ASSAY TEST FOR BLOOD FECAL: CPT | Performed by: PHYSICIAN ASSISTANT

## 2018-08-01 DIAGNOSIS — F33.1 MODERATE EPISODE OF RECURRENT MAJOR DEPRESSIVE DISORDER (HCC): ICD-10-CM

## 2018-08-01 NOTE — TELEPHONE ENCOUNTER
From: Tomer Sparrow  Sent: 7/31/2018 7:50 PM EDT  Subject: Medication Renewal Request    Nery Abdul.  Farnaz Negron would like a refill of the following medications:     DULoxetine (CYMBALTA) 60 MG extended release capsule [Ranjan Dickerson MD]   Patient Comment: would like 90 days supply thru [address]    Preferred pharmacy: [address] [place] [address] [phone] Yari Hart [phone]

## 2018-08-02 RX ORDER — DULOXETIN HYDROCHLORIDE 60 MG/1
60 CAPSULE, DELAYED RELEASE ORAL DAILY
Qty: 90 CAPSULE | Refills: 1 | Status: SHIPPED | OUTPATIENT
Start: 2018-08-02 | End: 2018-09-27 | Stop reason: SDUPTHER

## 2018-08-21 ENCOUNTER — HOSPITAL ENCOUNTER (OUTPATIENT)
Dept: WOMENS IMAGING | Age: 73
Discharge: HOME | End: 2018-08-21
Payer: MEDICARE

## 2018-08-21 DIAGNOSIS — Z12.31 ENCOUNTER FOR SCREENING MAMMOGRAM FOR BREAST CANCER: ICD-10-CM

## 2018-08-21 PROCEDURE — 77063 BREAST TOMOSYNTHESIS BI: CPT

## 2018-09-24 ENCOUNTER — TELEPHONE (OUTPATIENT)
Dept: FAMILY MEDICINE CLINIC | Age: 73
End: 2018-09-24

## 2018-09-27 DIAGNOSIS — F33.1 MODERATE EPISODE OF RECURRENT MAJOR DEPRESSIVE DISORDER (HCC): ICD-10-CM

## 2018-09-27 DIAGNOSIS — M81.0 OSTEOPOROSIS, UNSPECIFIED OSTEOPOROSIS TYPE, UNSPECIFIED PATHOLOGICAL FRACTURE PRESENCE: ICD-10-CM

## 2018-09-27 DIAGNOSIS — E78.2 MIXED HYPERLIPIDEMIA: ICD-10-CM

## 2018-09-27 DIAGNOSIS — E11.9 CONTROLLED TYPE 2 DIABETES MELLITUS WITHOUT COMPLICATION, WITHOUT LONG-TERM CURRENT USE OF INSULIN (HCC): Primary | ICD-10-CM

## 2018-09-27 DIAGNOSIS — E03.9 HYPOTHYROIDISM, UNSPECIFIED TYPE: ICD-10-CM

## 2018-09-27 NOTE — TELEPHONE ENCOUNTER
Pt requesting that refill be sent to a new [facility] , which is a pharmacy that will custom package her meds by dosage and day / I spoke with [place] and she does wish it to be sent there   Last seen on 7/10/18 when most of her cscripts were last refilled

## 2018-09-28 RX ORDER — DULOXETIN HYDROCHLORIDE 60 MG/1
60 CAPSULE, DELAYED RELEASE ORAL DAILY
Qty: 90 CAPSULE | Refills: 1 | Status: SHIPPED | OUTPATIENT
Start: 2018-09-28 | End: 2018-12-18 | Stop reason: SDUPTHER

## 2018-09-28 RX ORDER — METFORMIN HYDROCHLORIDE 500 MG/1
500-1000 TABLET, EXTENDED RELEASE ORAL 2 TIMES DAILY WITH MEALS
Qty: 360 TABLET | Refills: 1 | Status: SHIPPED | OUTPATIENT
Start: 2018-09-28 | End: 2018-11-26 | Stop reason: DRUGHIGH

## 2018-09-28 RX ORDER — LEVOTHYROXINE SODIUM 0.1 MG/1
100 TABLET ORAL DAILY
Qty: 90 TABLET | Refills: 3 | Status: SHIPPED | OUTPATIENT
Start: 2018-09-28 | End: 2018-11-27 | Stop reason: DRUGHIGH

## 2018-09-28 RX ORDER — SIMVASTATIN 40 MG
TABLET ORAL
Qty: 90 TABLET | Refills: 1 | Status: SHIPPED | OUTPATIENT
Start: 2018-09-28 | End: 2019-01-16 | Stop reason: SDUPTHER

## 2018-09-28 RX ORDER — ALENDRONATE SODIUM 70 MG/1
70 TABLET ORAL
Qty: 12 TABLET | Refills: 3 | Status: SHIPPED | OUTPATIENT
Start: 2018-09-28 | End: 2019-04-23 | Stop reason: SDUPTHER

## 2018-11-05 ENCOUNTER — PATIENT MESSAGE (OUTPATIENT)
Dept: FAMILY MEDICINE CLINIC | Age: 73
End: 2018-11-05

## 2018-11-05 DIAGNOSIS — C50.411 MALIGNANT NEOPLASM OF UPPER-OUTER QUADRANT OF RIGHT FEMALE BREAST, UNSPECIFIED ESTROGEN RECEPTOR STATUS (HCC): Primary | ICD-10-CM

## 2018-11-26 ENCOUNTER — OFFICE VISIT (OUTPATIENT)
Dept: FAMILY MEDICINE CLINIC | Age: 73
End: 2018-11-26
Payer: MEDICARE

## 2018-11-26 VITALS
DIASTOLIC BLOOD PRESSURE: 76 MMHG | WEIGHT: 132.2 LBS | BODY MASS INDEX: 22.02 KG/M2 | SYSTOLIC BLOOD PRESSURE: 130 MMHG | OXYGEN SATURATION: 98 % | HEART RATE: 104 BPM | HEIGHT: 65 IN

## 2018-11-26 DIAGNOSIS — E78.2 MIXED HYPERLIPIDEMIA: ICD-10-CM

## 2018-11-26 DIAGNOSIS — R26.89 BALANCE PROBLEM: ICD-10-CM

## 2018-11-26 DIAGNOSIS — E11.9 CONTROLLED TYPE 2 DIABETES MELLITUS WITHOUT COMPLICATION, WITHOUT LONG-TERM CURRENT USE OF INSULIN (HCC): Primary | ICD-10-CM

## 2018-11-26 DIAGNOSIS — E03.9 HYPOTHYROIDISM, UNSPECIFIED TYPE: ICD-10-CM

## 2018-11-26 DIAGNOSIS — R41.3 MEMORY DEFICIT: ICD-10-CM

## 2018-11-26 LAB
A/G RATIO: 2 (ref 1.1–2.2)
ALBUMIN SERPL-MCNC: 4.7 G/DL (ref 3.4–5)
ALP BLD-CCNC: 112 U/L (ref 40–129)
ALT SERPL-CCNC: 12 U/L (ref 10–40)
ANION GAP SERPL CALCULATED.3IONS-SCNC: 13 MMOL/L (ref 3–16)
AST SERPL-CCNC: 13 U/L (ref 15–37)
BILIRUB SERPL-MCNC: 0.4 MG/DL (ref 0–1)
BUN BLDV-MCNC: 11 MG/DL (ref 7–20)
CALCIUM SERPL-MCNC: 10.1 MG/DL (ref 8.3–10.6)
CHLORIDE BLD-SCNC: 101 MMOL/L (ref 99–110)
CHOLESTEROL, TOTAL: 202 MG/DL (ref 0–199)
CO2: 28 MMOL/L (ref 21–32)
CREAT SERPL-MCNC: 0.7 MG/DL (ref 0.6–1.2)
GFR AFRICAN AMERICAN: >60
GFR NON-AFRICAN AMERICAN: >60
GLOBULIN: 2.4 G/DL
GLUCOSE BLD-MCNC: 208 MG/DL (ref 70–99)
HBA1C MFR BLD: 7.1 %
HDLC SERPL-MCNC: 58 MG/DL (ref 40–60)
LDL CHOLESTEROL CALCULATED: 111 MG/DL
POTASSIUM SERPL-SCNC: 5.4 MMOL/L (ref 3.5–5.1)
SODIUM BLD-SCNC: 142 MMOL/L (ref 136–145)
TOTAL PROTEIN: 7.1 G/DL (ref 6.4–8.2)
TRIGL SERPL-MCNC: 164 MG/DL (ref 0–150)
TSH SERPL DL<=0.05 MIU/L-ACNC: 0.09 UIU/ML (ref 0.27–4.2)
VLDLC SERPL CALC-MCNC: 33 MG/DL

## 2018-11-26 PROCEDURE — 1123F ACP DISCUSS/DSCN MKR DOCD: CPT | Performed by: PHYSICIAN ASSISTANT

## 2018-11-26 PROCEDURE — 1101F PT FALLS ASSESS-DOCD LE1/YR: CPT | Performed by: PHYSICIAN ASSISTANT

## 2018-11-26 PROCEDURE — 3017F COLORECTAL CA SCREEN DOC REV: CPT | Performed by: PHYSICIAN ASSISTANT

## 2018-11-26 PROCEDURE — 4040F PNEUMOC VAC/ADMIN/RCVD: CPT | Performed by: PHYSICIAN ASSISTANT

## 2018-11-26 PROCEDURE — 99214 OFFICE O/P EST MOD 30 MIN: CPT | Performed by: PHYSICIAN ASSISTANT

## 2018-11-26 PROCEDURE — 83036 HEMOGLOBIN GLYCOSYLATED A1C: CPT | Performed by: PHYSICIAN ASSISTANT

## 2018-11-26 PROCEDURE — G8420 CALC BMI NORM PARAMETERS: HCPCS | Performed by: PHYSICIAN ASSISTANT

## 2018-11-26 PROCEDURE — G8427 DOCREV CUR MEDS BY ELIG CLIN: HCPCS | Performed by: PHYSICIAN ASSISTANT

## 2018-11-26 PROCEDURE — 1036F TOBACCO NON-USER: CPT | Performed by: PHYSICIAN ASSISTANT

## 2018-11-26 PROCEDURE — 2022F DILAT RTA XM EVC RTNOPTHY: CPT | Performed by: PHYSICIAN ASSISTANT

## 2018-11-26 PROCEDURE — 36415 COLL VENOUS BLD VENIPUNCTURE: CPT | Performed by: PHYSICIAN ASSISTANT

## 2018-11-26 PROCEDURE — G8399 PT W/DXA RESULTS DOCUMENT: HCPCS | Performed by: PHYSICIAN ASSISTANT

## 2018-11-26 PROCEDURE — 1090F PRES/ABSN URINE INCON ASSESS: CPT | Performed by: PHYSICIAN ASSISTANT

## 2018-11-26 PROCEDURE — 3045F PR MOST RECENT HEMOGLOBIN A1C LEVEL 7.0-9.0%: CPT | Performed by: PHYSICIAN ASSISTANT

## 2018-11-26 PROCEDURE — G8484 FLU IMMUNIZE NO ADMIN: HCPCS | Performed by: PHYSICIAN ASSISTANT

## 2018-11-26 RX ORDER — METFORMIN HYDROCHLORIDE 500 MG/1
500 TABLET, EXTENDED RELEASE ORAL 2 TIMES DAILY WITH MEALS
Qty: 360 TABLET | Refills: 1 | Status: SHIPPED
Start: 2018-11-26 | End: 2019-04-23 | Stop reason: SDUPTHER

## 2018-11-27 DIAGNOSIS — E03.9 HYPOTHYROIDISM, UNSPECIFIED TYPE: ICD-10-CM

## 2018-11-27 DIAGNOSIS — E87.5 HYPERKALEMIA: Primary | ICD-10-CM

## 2018-11-27 RX ORDER — LEVOTHYROXINE SODIUM 0.1 MG/1
100 TABLET ORAL EVERY OTHER DAY
Qty: 90 TABLET | Refills: 0 | Status: SHIPPED
Start: 2018-11-27 | End: 2018-12-31

## 2018-11-29 ENCOUNTER — NURSE ONLY (OUTPATIENT)
Dept: FAMILY MEDICINE CLINIC | Age: 73
End: 2018-11-29
Payer: MEDICARE

## 2018-11-29 DIAGNOSIS — E87.5 HYPERKALEMIA: ICD-10-CM

## 2018-11-29 LAB — POTASSIUM SERPL-SCNC: 4.1 MMOL/L (ref 3.5–5.1)

## 2018-11-29 PROCEDURE — 36415 COLL VENOUS BLD VENIPUNCTURE: CPT | Performed by: PHYSICIAN ASSISTANT

## 2018-12-02 ENCOUNTER — HOSPITAL ENCOUNTER (EMERGENCY)
Age: 73
Discharge: HOME | End: 2018-12-02
Attending: EMERGENCY MEDICINE
Payer: MEDICARE

## 2018-12-02 ENCOUNTER — APPOINTMENT (OUTPATIENT)
Dept: GENERAL RADIOLOGY | Age: 73
End: 2018-12-02
Payer: MEDICARE

## 2018-12-02 VITALS
SYSTOLIC BLOOD PRESSURE: 141 MMHG | RESPIRATION RATE: 18 BRPM | OXYGEN SATURATION: 97 % | WEIGHT: 130 LBS | BODY MASS INDEX: 20.89 KG/M2 | HEART RATE: 96 BPM | TEMPERATURE: 98 F | HEIGHT: 66 IN | DIASTOLIC BLOOD PRESSURE: 89 MMHG

## 2018-12-02 DIAGNOSIS — S20.221A BACK CONTUSION, RIGHT, INITIAL ENCOUNTER: ICD-10-CM
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
## 2018-12-02 PROCEDURE — 71046 X-RAY EXAM CHEST 2 VIEWS: CPT

## 2018-12-02 PROCEDURE — 6370000000 HC RX 637 (ALT 250 FOR IP): Performed by: EMERGENCY MEDICINE

## 2018-12-02 PROCEDURE — 99283 EMERGENCY DEPT VISIT LOW MDM: CPT

## 2018-12-02 RX ORDER — ACETAMINOPHEN 500 MG
500 TABLET ORAL ONCE
Status: COMPLETED | OUTPATIENT
Start: 2018-12-02 | End: 2018-12-02

## 2018-12-02 RX ADMIN — ACETAMINOPHEN 500 MG: 500 TABLET ORAL at 22:59

## 2018-12-02 ASSESSMENT — PAIN SCALES - GENERAL
PAINLEVEL_OUTOF10: 2
PAINLEVEL_OUTOF10: 6

## 2018-12-14 DIAGNOSIS — F33.1 MODERATE EPISODE OF RECURRENT MAJOR DEPRESSIVE DISORDER (HCC): ICD-10-CM

## 2018-12-14 RX ORDER — DULOXETIN HYDROCHLORIDE 60 MG/1
60 CAPSULE, DELAYED RELEASE ORAL DAILY
Qty: 28 CAPSULE | Refills: 0 | Status: CANCELLED | OUTPATIENT
Start: 2018-12-14

## 2018-12-17 ENCOUNTER — TELEPHONE (OUTPATIENT)
Dept: FAMILY MEDICINE CLINIC | Age: 73
End: 2018-12-17

## 2018-12-18 DIAGNOSIS — F33.1 MODERATE EPISODE OF RECURRENT MAJOR DEPRESSIVE DISORDER (HCC): ICD-10-CM

## 2018-12-18 RX ORDER — DULOXETIN HYDROCHLORIDE 60 MG/1
120 CAPSULE, DELAYED RELEASE ORAL DAILY
Qty: 60 CAPSULE | Refills: 0
Start: 2018-12-18 | End: 2020-09-03 | Stop reason: SDUPTHER

## 2018-12-18 RX ORDER — DULOXETIN HYDROCHLORIDE 60 MG/1
60 CAPSULE, DELAYED RELEASE ORAL DAILY
Qty: 90 CAPSULE | Refills: 1 | Status: SHIPPED | OUTPATIENT
Start: 2018-12-18 | End: 2019-01-04 | Stop reason: SDUPTHER

## 2018-12-19 RX ORDER — DULOXETIN HYDROCHLORIDE 30 MG/1
CAPSULE, DELAYED RELEASE ORAL
Qty: 30 CAPSULE | Refills: 0 | OUTPATIENT
Start: 2018-12-19

## 2018-12-28 ENCOUNTER — NURSE ONLY (OUTPATIENT)
Dept: FAMILY MEDICINE CLINIC | Age: 73
End: 2018-12-28
Payer: MEDICARE

## 2018-12-28 DIAGNOSIS — E03.9 HYPOTHYROIDISM, UNSPECIFIED TYPE: Primary | ICD-10-CM

## 2018-12-28 LAB — TSH SERPL DL<=0.05 MIU/L-ACNC: 8.05 UIU/ML (ref 0.27–4.2)

## 2018-12-28 PROCEDURE — 36415 COLL VENOUS BLD VENIPUNCTURE: CPT | Performed by: PHYSICIAN ASSISTANT

## 2018-12-31 DIAGNOSIS — E03.9 HYPOTHYROIDISM, UNSPECIFIED TYPE: Primary | ICD-10-CM

## 2018-12-31 RX ORDER — LEVOTHYROXINE SODIUM 0.05 MG/1
50 TABLET ORAL DAILY
Qty: 90 TABLET | Refills: 0 | Status: SHIPPED | OUTPATIENT
Start: 2018-12-31 | End: 2019-07-24 | Stop reason: SDUPTHER

## 2019-01-02 ENCOUNTER — TELEPHONE (OUTPATIENT)
Dept: FAMILY MEDICINE CLINIC | Age: 74
End: 2019-01-02

## 2019-01-02 DIAGNOSIS — F33.1 MODERATE EPISODE OF RECURRENT MAJOR DEPRESSIVE DISORDER (HCC): Primary | ICD-10-CM

## 2019-01-04 RX ORDER — DULOXETIN HYDROCHLORIDE 60 MG/1
120 CAPSULE, DELAYED RELEASE ORAL DAILY
Qty: 180 CAPSULE | Refills: 1 | Status: SHIPPED | OUTPATIENT
Start: 2019-01-04 | End: 2019-05-28 | Stop reason: SDUPTHER

## 2019-01-16 DIAGNOSIS — E78.2 MIXED HYPERLIPIDEMIA: ICD-10-CM

## 2019-01-21 RX ORDER — ANASTROZOLE 1 MG/1
1 TABLET ORAL DAILY
Qty: 90 TABLET | Refills: 3 | Status: SHIPPED | OUTPATIENT
Start: 2019-01-21 | End: 2020-04-30 | Stop reason: ALTCHOICE

## 2019-01-21 RX ORDER — SIMVASTATIN 40 MG
TABLET ORAL
Qty: 90 TABLET | Refills: 1 | Status: SHIPPED | OUTPATIENT
Start: 2019-01-21 | End: 2019-07-20 | Stop reason: SDUPTHER

## 2019-03-08 LAB — DIABETIC RETINOPATHY: NEGATIVE

## 2019-04-23 DIAGNOSIS — E11.9 CONTROLLED TYPE 2 DIABETES MELLITUS WITHOUT COMPLICATION, WITHOUT LONG-TERM CURRENT USE OF INSULIN (HCC): ICD-10-CM

## 2019-04-23 DIAGNOSIS — E11.9 DIABETES MELLITUS TYPE II, CONTROLLED (HCC): ICD-10-CM

## 2019-04-23 DIAGNOSIS — M81.0 OSTEOPOROSIS, UNSPECIFIED OSTEOPOROSIS TYPE, UNSPECIFIED PATHOLOGICAL FRACTURE PRESENCE: ICD-10-CM

## 2019-04-23 RX ORDER — METFORMIN HYDROCHLORIDE 500 MG/1
500 TABLET, EXTENDED RELEASE ORAL 2 TIMES DAILY WITH MEALS
Qty: 360 TABLET | Refills: 1 | Status: SHIPPED | OUTPATIENT
Start: 2019-04-23 | End: 2019-11-25 | Stop reason: SDUPTHER

## 2019-04-23 RX ORDER — ALENDRONATE SODIUM 70 MG/1
70 TABLET ORAL
Qty: 12 TABLET | Refills: 3 | Status: SHIPPED | OUTPATIENT
Start: 2019-04-23 | End: 2019-11-25 | Stop reason: SDUPTHER

## 2019-04-24 DIAGNOSIS — E11.9 CONTROLLED TYPE 2 DIABETES MELLITUS WITHOUT COMPLICATION, WITHOUT LONG-TERM CURRENT USE OF INSULIN (HCC): Primary | ICD-10-CM

## 2019-04-24 NOTE — TELEPHONE ENCOUNTER
Pharmacy called stating that they no longer carry the blood glucose test strips (ASCENSIA AUTODISC VI;ONE TOUCH ULTRA TEST VI) strips. Pharmacy informed me that they would need all new supplies since we do not know the type glucometer. They need a generic glucometer as well as generic lancets sent over electronically with the diagnosis code and they can use what her insurance (medicare) covers. Patient is set to leave town tomorrow so she needs this completed now if possible. Please advise.

## 2019-04-25 RX ORDER — LANCETS 30 GAUGE
EACH MISCELLANEOUS
Qty: 100 EACH | Refills: 3 | Status: SHIPPED | OUTPATIENT
Start: 2019-04-25

## 2019-04-25 RX ORDER — GLUCOSAMINE HCL/CHONDROITIN SU 500-400 MG
CAPSULE ORAL
Qty: 100 STRIP | Refills: 5 | Status: SHIPPED | OUTPATIENT
Start: 2019-04-25

## 2019-04-25 RX ORDER — BLOOD-GLUCOSE METER
1 KIT MISCELLANEOUS DAILY
Qty: 1 KIT | Refills: 0 | Status: SHIPPED | OUTPATIENT
Start: 2019-04-25 | End: 2020-09-02

## 2019-04-29 ENCOUNTER — OFFICE VISIT (OUTPATIENT)
Dept: ORTHOPEDIC SURGERY | Age: 74
End: 2019-04-29
Payer: MEDICARE

## 2019-04-29 VITALS — WEIGHT: 138 LBS | HEIGHT: 66 IN | BODY MASS INDEX: 22.18 KG/M2 | RESPIRATION RATE: 16 BRPM

## 2019-04-29 DIAGNOSIS — R52 PAIN: Primary | ICD-10-CM

## 2019-04-29 DIAGNOSIS — S92.351A CLOSED DISPLACED FRACTURE OF FIFTH METATARSAL BONE OF RIGHT FOOT, INITIAL ENCOUNTER: ICD-10-CM

## 2019-04-29 PROCEDURE — 3017F COLORECTAL CA SCREEN DOC REV: CPT | Performed by: PHYSICIAN ASSISTANT

## 2019-04-29 PROCEDURE — 1036F TOBACCO NON-USER: CPT | Performed by: PHYSICIAN ASSISTANT

## 2019-04-29 PROCEDURE — G8420 CALC BMI NORM PARAMETERS: HCPCS | Performed by: PHYSICIAN ASSISTANT

## 2019-04-29 PROCEDURE — G8399 PT W/DXA RESULTS DOCUMENT: HCPCS | Performed by: PHYSICIAN ASSISTANT

## 2019-04-29 PROCEDURE — L3260 AMBULATORY SURGICAL BOOT EAC: HCPCS | Performed by: PHYSICIAN ASSISTANT

## 2019-04-29 PROCEDURE — 4040F PNEUMOC VAC/ADMIN/RCVD: CPT | Performed by: PHYSICIAN ASSISTANT

## 2019-04-29 PROCEDURE — 1123F ACP DISCUSS/DSCN MKR DOCD: CPT | Performed by: PHYSICIAN ASSISTANT

## 2019-04-29 PROCEDURE — G8427 DOCREV CUR MEDS BY ELIG CLIN: HCPCS | Performed by: PHYSICIAN ASSISTANT

## 2019-04-29 PROCEDURE — 99213 OFFICE O/P EST LOW 20 MIN: CPT | Performed by: PHYSICIAN ASSISTANT

## 2019-04-29 PROCEDURE — 1090F PRES/ABSN URINE INCON ASSESS: CPT | Performed by: PHYSICIAN ASSISTANT

## 2019-04-29 RX ORDER — TRAZODONE HYDROCHLORIDE 50 MG/1
50 TABLET ORAL NIGHTLY
COMMUNITY
End: 2019-11-25 | Stop reason: SDUPTHER

## 2019-04-29 RX ORDER — PRIMIDONE 50 MG/1
50 TABLET ORAL 3 TIMES DAILY
COMMUNITY
End: 2020-04-30 | Stop reason: SDUPTHER

## 2019-04-29 NOTE — PROGRESS NOTES
Subjective:      Patient ID: Jessenia Jeffrey is a 68 y.o.  female. Chief Complaint   Patient presents with    Foot Pain     Lateral        HPI:   She is here for an initial evaluation of right foot pain after an  injury- fell OOB. Onset of symptoms 5 days ago. These symptoms have not been progressive in nature. Pain is located over the metatarsal region. Pain is on average 6/10. Pain is worse with weight bearing and the pain improves with elevation. There is not associated numbness/ tingling. Previous treatments have included: ice and use of a tall boot with mild  relief or improvement. Review of Systems:   A 14 point review of systems and history form completed by the patient has been reviewed. This form is scanned in the media tab of the patient's chart under today's date.       Past Medical History:   Diagnosis Date    Allergic rhinitis     Cancer ([facility] [address] 2013    right breast    Cervical radicular pain     Depression     Hyperlipidemia     Hypertension     Hypothyroidism     Type II or unspecified type diabetes mellitus without mention of complication, not stated as uncontrolled     Unspecified sprain of left wrist, initial encounter 2016       Family History   Problem Relation Age of Onset    Diabetes Daughter 5        FLORENTINO (Type I)   [facility] Cancer Daughter         breast CA    Cancer Daughter     Cancer Father        Past Surgical History:   Procedure Laterality Date    BREAST SURGERY Right 2013    lumpectomy with sentinel node biopsy    COLONOSCOPY  2017    polyp, diverticulosis    THYROIDECTOMY  2004    thyroid CA  (papillary) - Dr. Elizabeth Fees History     Occupational History    Not on file   Tobacco Use    Smoking status: Former Smoker     Packs/day: 1.50     Years: 10.00     Pack years: 15.00     Last attempt to quit: 1986     Years since quittin.3    Smokeless tobacco: Never Used    Tobacco comment: Quit 2 times total   Substance and Sexual Activity    Alcohol use: Yes     Alcohol/week: 0.0 - 1.2 oz     Comment: occas - 1-2/mo    Drug use: No    Sexual activity: Not Currently       Current Outpatient Medications   Medication Sig Dispense Refill    primidone (MYSOLINE) 50 MG tablet Take 50 mg by mouth 3 times daily      traZODone (DESYREL) 50 MG tablet Take 50 mg by mouth nightly      glucose monitoring kit (FREESTYLE) monitoring kit 1 kit by Does not apply route daily DX: E11.9 (Brand per insurance coverage) 1 kit 0    blood glucose monitor strips TESTS BID  DX: E11.9  Please fill brand per insurance coverage 100 strip 5    Lancets MISC TESTS BID  DX: E11.9  Please fill brand per insurance coverage 100 each 3    blood glucose test strips (ASCENSIA AUTODISC VI;ONE TOUCH ULTRA TEST VI) strip As needed. Testing qd 100 strip 3    alendronate (FOSAMAX) 70 MG tablet Take 1 tablet by mouth every 7 days As directed. 12 tablet 3    metFORMIN (GLUCOPHAGE-XR) 500 MG extended release tablet Take 1 tablet by mouth 2 times daily (with meals) 500 qam and 1000mg qpm 360 tablet 1    simvastatin (ZOCOR) 40 MG tablet TAKE 1 TABLET EVERY NIGHT (FOR HIGH CHOLESTEROL) 90 tablet 1    levothyroxine (SYNTHROID) 50 MCG tablet Take 1 tablet by mouth daily 90 tablet 0    DULoxetine (CYMBALTA) 60 MG extended release capsule Take 2 capsules by mouth daily (instead of Zoloft). 60 capsule 0    multivitamin (THERAGRAN) per tablet Take 1 tablet by mouth daily.  aspirin 81 MG EC tablet Take 81 mg by mouth daily.       anastrozole (ARIMIDEX) 1 MG tablet Take 1 tablet by mouth daily 90 tablet 3    DULoxetine (CYMBALTA) 60 MG extended release capsule Take 2 capsules by mouth daily 180 capsule 1    meloxicam (MOBIC) 15 MG tablet Take 15 mg by mouth daily      Probiotic Product (ALIGN) 4 MG CAPS   Take 4 mg by mouth Indications: as needed       polyethylene glycol (MIRALAX) PACK packet   Take 17 g by mouth as needed       famotidine (PEPCID) 20 MG tablet Take 1 tablet by mouth 2 times daily for 14 doses. 14 tablet 0    loratadine (CLARITIN) 10 MG tablet   Take 10 mg by mouth daily Indications: as needed        No current facility-administered medications for this visit. Objective:     She  is alert, oriented x 3, pleasant, well nourished, developed and in no acute distress. Resp 16   Ht 5' 6\" (1.676 m)   Wt 138 lb (62.6 kg)   LMP 02/03/1997   BMI 22.27 kg/m²      Examination of the Right Foot:  There is moderate soft tissue swelling. There is no obvious deformity. There is moderate bony tenderness to palpation over the fifth metatarsal(s). There is no crepitus with palpation. ROM is limited by pain and/ or swelling. NEUROLOGICAL EXAM:  Examination of the lower extremities are intact with sensation to light touch to the foot and digits. Able to move all digits. VASCULAR EXAM:  Examination of the lower extremities shows intact perfusion to both extremities. No cyanosis, digits are warm to touch, capillary refill is less than 2 seconds. SKIN:  Examination of the skin reveals the skin to be intact without lacerations, abrasions, significant erythema, rashes or skin lesions. X Rays: performed in the office today:   AP, Lateral and Oblique of the Right Foot:  Radiographs demonstrate normal bony alignment of the foot. There is radiographic evidence of a fracture through the mid shaft of the 5th metatarsal. The alignment is mildly angulated. There is no dislocations. Assessment:       ICD-10-CM    1. Pain R52 XR FOOT RIGHT (MIN 3 VIEWS)     DJO Post-Op Shoe   2. Closed displaced fracture of fifth metatarsal bone of right foot, initial encounter S92.351A DJO Post-Op Shoe        Plan:     The natural history of the patient's diagnosis as well as the treatment options were discussed in full and questions were answered. Risks and benefits of the treatment options also reviewed in detail.    She has a mildly angulated shaft fracture of the right fifth        Procedures    DJO Post-Op Shoe     Patient was prescribed a DJO Post-Op Shoe. The right foot will require stabilization / immobilization from this semi-rigid / rigid orthosis to improve their function. The orthosis will assist in protecting the affected area, provide functional support and facilitate healing. Patient was instructed to progress ambulation weight bearing as tolerated in the device. The plan of care is to progress the patient to full weight bearing status. The patient was educated and fit by a healthcare professional with expert knowledge and specialization in brace application. Verbal and written instructions for the use of and application of this item were provided. They were instructed to contact the office immediately should the brace result in increased pain, decreased sensation, increased swelling or worsening of the condition. Weight Bearing as tolerated on the affected extremity. Rest, Ice, Compression and Elevation  Tylenol for pain. Activity Restriction/ Modification discussed. Follow Up: 1 week with Dr Elidia Young  Call or return to clinic prn if these symptoms worsen or fail to improve as anticipated.

## 2019-05-06 ENCOUNTER — OFFICE VISIT (OUTPATIENT)
Dept: ORTHOPEDIC SURGERY | Age: 74
End: 2019-05-06
Payer: MEDICARE

## 2019-05-06 VITALS
DIASTOLIC BLOOD PRESSURE: 70 MMHG | HEIGHT: 66 IN | WEIGHT: 138.01 LBS | BODY MASS INDEX: 22.18 KG/M2 | SYSTOLIC BLOOD PRESSURE: 104 MMHG | HEART RATE: 95 BPM

## 2019-05-06 DIAGNOSIS — S92.351A CLOSED DISPLACED FRACTURE OF FIFTH METATARSAL BONE OF RIGHT FOOT, INITIAL ENCOUNTER: Primary | ICD-10-CM

## 2019-05-06 DIAGNOSIS — M79.671 FOOT PAIN, RIGHT: ICD-10-CM

## 2019-05-06 DIAGNOSIS — S90.31XA CONTUSION OF RIGHT FOOT, INITIAL ENCOUNTER: ICD-10-CM

## 2019-05-06 PROCEDURE — 1123F ACP DISCUSS/DSCN MKR DOCD: CPT | Performed by: FAMILY MEDICINE

## 2019-05-06 PROCEDURE — G8420 CALC BMI NORM PARAMETERS: HCPCS | Performed by: FAMILY MEDICINE

## 2019-05-06 PROCEDURE — G8399 PT W/DXA RESULTS DOCUMENT: HCPCS | Performed by: FAMILY MEDICINE

## 2019-05-06 PROCEDURE — 99203 OFFICE O/P NEW LOW 30 MIN: CPT | Performed by: FAMILY MEDICINE

## 2019-05-06 PROCEDURE — 4040F PNEUMOC VAC/ADMIN/RCVD: CPT | Performed by: FAMILY MEDICINE

## 2019-05-06 PROCEDURE — G8427 DOCREV CUR MEDS BY ELIG CLIN: HCPCS | Performed by: FAMILY MEDICINE

## 2019-05-06 PROCEDURE — 1036F TOBACCO NON-USER: CPT | Performed by: FAMILY MEDICINE

## 2019-05-06 PROCEDURE — 1090F PRES/ABSN URINE INCON ASSESS: CPT | Performed by: FAMILY MEDICINE

## 2019-05-06 PROCEDURE — 3017F COLORECTAL CA SCREEN DOC REV: CPT | Performed by: FAMILY MEDICINE

## 2019-05-06 NOTE — PROGRESS NOTES
Subjective:      Patient ID: Marsha Roberts is a 68 y.o.  female. Chief Complaint   Patient presents with    Foot Pain     [address] [place] [address] 4/29/19 RIGHT FOOT 5TH METATARSAL FRACTURE      Initial consultation right lateral midfoot pain with difficulty walking status post fall 4/24/2019    HPI:   She is here for an initial evaluation of right foot pain after an  injury- fell OOB. Onset of symptoms 5 days ago. These symptoms have not been progressive in nature. Pain is located over the metatarsal region. Pain is on average 6/10. Pain is worse with weight bearing and the pain improves with elevation. There is not associated numbness/ tingling. Previous treatments have included: ice and use of a tall boot with mild  relief or improvement. Aleyda Shoemaker is a very pleasant reasonably active white female who does work part-time as a plant nursery assistant and is a very nice patient of Dr. Shawna Rojo who is being seen today in after hours follow-up from 4/29/2019 for evaluation of an injury to her right foot. She states that on 4/24/2019 she was sitting on her bed attempting to put on pantyhose when she lost her balance striking the lateral portion of her right mid foot against a hard wood floor. She believes she felt a pop at the time of the injury and did have immediate pain which did improve. She did swell and developed ecchymosis but initially treated herself with some ice and Tylenol. Due to persistence of pain and difficulty bearing weight laterally, she was seen in after hours her x-rays did reveal an oblique right 5th plantar tarsal shaft fracture. We initially recommended she utilize a boot however she transitioned herself into a postop shoe as it was much more comfortable. She is a reasonably well-controlled diabetic with last A1c was 7.1 per the patient. Her swelling has improved. She is still little reluctant to put weight to the lateral midfoot. Denies locking catching instability numbness or tingling. She is being seen today for orthopedic and sports consultation with review of her imaging. Review of Systems:   A 14 point review of systems and history form completed by the patient has been reviewed. This form is scanned in the media tab of the patient's chart under today's date. Past Medical History:   Diagnosis Date    Allergic rhinitis     Cancer ([address] 2013    right breast    Cervical radicular pain     Depression     Hyperlipidemia     Hypertension     Hypothyroidism     Type II or unspecified type diabetes mellitus without mention of complication, not stated as uncontrolled     Unspecified sprain of left wrist, initial encounter 2016       Family History   Problem Relation Age of Onset    Diabetes Daughter 5        FLORENTINO (Type I)   Nazario Dodson Cancer Daughter         breast CA    Cancer Daughter     Cancer Father        Past Surgical History:   Procedure Laterality Date    BREAST SURGERY Right 2013    lumpectomy with sentinel node biopsy    COLONOSCOPY  2017    polyp, diverticulosis    THYROIDECTOMY  2004    thyroid CA  (papillary) - Dr. Tika Urena History     Occupational History    Not on file   Tobacco Use    Smoking status: Former Smoker     Packs/day: 1.50     Years: 10.00     Pack years: 15.00     Last attempt to quit: 1986     Years since quittin.3    Smokeless tobacco: Never Used    Tobacco comment: Quit 2 times total   Substance and Sexual Activity    Alcohol use:  Yes     Alcohol/week: 0.0 - 1.2 oz     Comment: occas - 1-2/mo    Drug use: No    Sexual activity: Not Currently       Current Outpatient Medications   Medication Sig Dispense Refill    primidone (MYSOLINE) 50 MG tablet Take 50 mg by mouth 3 times daily      traZODone (DESYREL) 50 MG tablet Take 50 mg by mouth nightly      glucose monitoring kit (FREESTYLE) monitoring kit 1 kit by Does not apply route daily DX: E11.9 (Brand per insurance coverage) 1 is no residual soft tissue swelling. Resolving ecchymosis distally  There is no obvious deformity. There is mild to moderate bony tenderness to palpation over the fifth metatarsal(s) at the distal shaft. There is no crepitus with palpation. ROM is limited by pain and/ or swelling. Flexor and extensor tendon function appears to be intact. Rotational defect or scissoring. NEUROLOGICAL EXAM:  Examination of the lower extremities are intact with sensation to light touch to the foot and digits. Able to move all digits. VASCULAR EXAM:  Examination of the lower extremities shows intact perfusion to both extremities. No cyanosis, digits are warm to touch, capillary refill is less than 2 seconds. SKIN:  Examination of the skin reveals the skin to be intact without lacerations, abrasions, significant erythema, rashes or skin lesions. X Rays: performed in the office today:   AP, Lateral and Oblique of the Right Foot:  Radiographs demonstrate normal bony alignment of the foot. There is radiographic evidence of a fracture through the mid shaft of the 5th metatarsal. The alignment is mildly angulated. She does appear to have some increased diastases from her films a week ago. Assessment:       ICD-10-CM    1. Closed displaced fracture of fifth metatarsal bone of right foot, initial encounter S92.351A    2. Contusion of right foot, initial encounter S90.31XA    3. Foot pain, right M79.671         Plan:     Plan: Treatment options were discussed with Didi Zamarripa today. We did review her plain films and exam findings. She is now 12 days out from her right foot contusion with right foot 5th metatarsal distal shaft oblique fracture. I would recommend given her increase in diastases over the past week that she utilize her boot consistently for the next 2-3 weeks. She is a well-controlled diabetic. Natural history of recovering from 5th metatarsal fractures were discussed.   I'll like to see her back

## 2019-05-07 ENCOUNTER — HOSPITAL ENCOUNTER (OUTPATIENT)
Dept: WOMENS IMAGING | Age: 74
Discharge: HOME | End: 2019-05-07
Payer: MEDICARE

## 2019-05-07 DIAGNOSIS — Z78.0 POSTMENOPAUSAL STATUS (AGE-RELATED) (NATURAL): ICD-10-CM

## 2019-05-07 PROCEDURE — 77080 DXA BONE DENSITY AXIAL: CPT

## 2019-05-20 ENCOUNTER — OFFICE VISIT (OUTPATIENT)
Dept: ORTHOPEDIC SURGERY | Age: 74
End: 2019-05-20
Payer: MEDICARE

## 2019-05-20 VITALS
BODY MASS INDEX: 22.18 KG/M2 | SYSTOLIC BLOOD PRESSURE: 111 MMHG | HEART RATE: 77 BPM | WEIGHT: 138.01 LBS | DIASTOLIC BLOOD PRESSURE: 68 MMHG | HEIGHT: 66 IN

## 2019-05-20 DIAGNOSIS — S90.31XA CONTUSION OF RIGHT FOOT, INITIAL ENCOUNTER: ICD-10-CM

## 2019-05-20 DIAGNOSIS — M79.671 FOOT PAIN, RIGHT: ICD-10-CM

## 2019-05-20 DIAGNOSIS — S92.351A CLOSED DISPLACED FRACTURE OF FIFTH METATARSAL BONE OF RIGHT FOOT, INITIAL ENCOUNTER: Primary | ICD-10-CM

## 2019-05-20 PROCEDURE — 1090F PRES/ABSN URINE INCON ASSESS: CPT | Performed by: FAMILY MEDICINE

## 2019-05-20 PROCEDURE — 1036F TOBACCO NON-USER: CPT | Performed by: FAMILY MEDICINE

## 2019-05-20 PROCEDURE — 99213 OFFICE O/P EST LOW 20 MIN: CPT | Performed by: FAMILY MEDICINE

## 2019-05-20 PROCEDURE — G8427 DOCREV CUR MEDS BY ELIG CLIN: HCPCS | Performed by: FAMILY MEDICINE

## 2019-05-20 PROCEDURE — 3017F COLORECTAL CA SCREEN DOC REV: CPT | Performed by: FAMILY MEDICINE

## 2019-05-20 PROCEDURE — G8420 CALC BMI NORM PARAMETERS: HCPCS | Performed by: FAMILY MEDICINE

## 2019-05-20 PROCEDURE — 4040F PNEUMOC VAC/ADMIN/RCVD: CPT | Performed by: FAMILY MEDICINE

## 2019-05-20 PROCEDURE — 1123F ACP DISCUSS/DSCN MKR DOCD: CPT | Performed by: FAMILY MEDICINE

## 2019-05-20 PROCEDURE — G8399 PT W/DXA RESULTS DOCUMENT: HCPCS | Performed by: FAMILY MEDICINE

## 2019-05-20 NOTE — PROGRESS NOTES
Subjective:      Patient ID: Jessenia Jeffrey is a 68 y.o.  female. Chief Complaint   Patient presents with    Foot Pain     CK RIGHT FOOT DOI: 4/24/2019      Initial consultation right lateral midfoot pain with difficulty walking status post fall 4/24/2019    HPI:   She is here for an initial evaluation of right foot pain after an  injury- fell OOB. Onset of symptoms 5 days ago. These symptoms have not been progressive in nature. Pain is located over the metatarsal region. Pain is on average 6/10. Pain is worse with weight bearing and the pain improves with elevation. There is not associated numbness/ tingling. Previous treatments have included: ice and use of a tall boot with mild  relief or improvement. 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 Maria Fernanda Resendiz who is being seen today in after hours follow-up from 4/29/2019 for evaluation of an injury to her right foot. She states that on 4/24/2019 she was sitting on her bed attempting to put on pantyhose when she lost her balance striking the lateral portion of her right mid foot against a hard wood floor. She believes she felt a pop at the time of the injury and did have immediate pain which did improve. She did swell and developed ecchymosis but initially treated herself with some ice and Tylenol. Due to persistence of pain and difficulty bearing weight laterally, she was seen in after hours her x-rays did reveal an oblique right 5th plantar tarsal shaft fracture. We initially recommended she utilize a boot however she transitioned herself into a postop shoe as it was much more comfortable. She is a reasonably well-controlled diabetic with last A1c was 7.1 per the patient. Her swelling has improved. She is still little reluctant to put weight to the lateral midfoot. Denies locking catching instability numbness or tingling.   She is being seen [address] TUBAL LIGATION         Social History     Occupational History    Not on file   Tobacco Use    Smoking status: Former Smoker     Packs/day: 1.50     Years: 10.00     Pack years: 15.00     Last attempt to quit: 1986     Years since quittin.4    Smokeless tobacco: Never Used    Tobacco comment: Quit 2 times total   Substance and Sexual Activity    Alcohol use: Yes     Alcohol/week: 0.0 - 1.2 oz     Comment: occas - 1-2/mo    Drug use: No    Sexual activity: Not Currently       Current Outpatient Medications   Medication Sig Dispense Refill    primidone (MYSOLINE) 50 MG tablet Take 50 mg by mouth 3 times daily      traZODone (DESYREL) 50 MG tablet Take 50 mg by mouth nightly      glucose monitoring kit (FREESTYLE) monitoring kit 1 kit by Does not apply route daily DX: E11.9 (Brand per insurance coverage) 1 kit 0    blood glucose monitor strips TESTS BID  DX: E11.9  Please fill brand per insurance coverage 100 strip 5    Lancets MISC TESTS BID  DX: E11.9  Please fill brand per insurance coverage 100 each 3    blood glucose test strips (ASCENSIA AUTODISC VI;ONE TOUCH ULTRA TEST VI) strip As needed. Testing qd 100 strip 3    alendronate (FOSAMAX) 70 MG tablet Take 1 tablet by mouth every 7 days As directed. 12 tablet 3    metFORMIN (GLUCOPHAGE-XR) 500 MG extended release tablet Take 1 tablet by mouth 2 times daily (with meals) 500 qam and 1000mg qpm 360 tablet 1    simvastatin (ZOCOR) 40 MG tablet TAKE 1 TABLET EVERY NIGHT (FOR HIGH CHOLESTEROL) 90 tablet 1    anastrozole (ARIMIDEX) 1 MG tablet Take 1 tablet by mouth daily 90 tablet 3    DULoxetine (CYMBALTA) 60 MG extended release capsule Take 2 capsules by mouth daily 180 capsule 1    levothyroxine (SYNTHROID) 50 MCG tablet Take 1 tablet by mouth daily 90 tablet 0    DULoxetine (CYMBALTA) 60 MG extended release capsule Take 2 capsules by mouth daily (instead of Zoloft).  60 capsule 0    meloxicam (MOBIC) 15 MG tablet Take 15 mg by mouth daily      Probiotic Product (ALIGN) 4 MG CAPS   Take 4 mg by mouth Indications: as needed       polyethylene glycol (MIRALAX) PACK packet   Take 17 g by mouth as needed       multivitamin (THERAGRAN) per tablet Take 1 tablet by mouth daily.  loratadine (CLARITIN) 10 MG tablet   Take 10 mg by mouth daily Indications: as needed       aspirin 81 MG EC tablet Take 81 mg by mouth daily.  famotidine (PEPCID) 20 MG tablet Take 1 tablet by mouth 2 times daily for 14 doses. 14 tablet 0     No current facility-administered medications for this visit. Objective:     She  is alert, oriented x 3, pleasant, well nourished, developed and in no acute distress. /68   Pulse 77   Ht 5' 5.98\" (1.676 m)   Wt 138 lb 0.1 oz (62.6 kg)   LMP 02/03/1997   BMI 22.29 kg/m²      Examination of the Right Foot:  There is no residual soft tissue swelling. Resolved ecchymosis distally  There is no obvious deformity. There is mild 2 out of 10 bony tenderness to palpation over the fifth metatarsal(s) at the distal shaft. There is no crepitus with palpation. ROM is less limited by pain and/ or swelling. Flexor and extensor tendon function appears to be intact. Rotational defect or scissoring. NEUROLOGICAL EXAM:  Examination of the lower extremities are intact with sensation to light touch to the foot and digits. Able to move all digits. VASCULAR EXAM:  Examination of the lower extremities shows intact perfusion to both extremities. No cyanosis, digits are warm to touch, capillary refill is less than 2 seconds. SKIN:  Examination of the skin reveals the skin to be intact without lacerations, abrasions, significant erythema, rashes or skin lesions. X Rays: performed in the office today:   AP, Lateral and Oblique of the Right Foot:  Radiographs demonstrate normal bony alignment of the foot.  There is radiographic evidence of a fracture through the mid shaft of the 5th metatarsal. The alignment is mildly angulated. She is not exhibiting worsening diastases since she is showing some very early callus formation. Overall alignment except acceptable. Assessment:       ICD-10-CM    1. Closed displaced fracture of fifth metatarsal bone of right foot, initial encounter S92.351A XR FOOT RIGHT (MIN 3 VIEWS)   2. Contusion of right foot, initial encounter S90.31XA    3. Foot pain, right M79.671         Plan:     Plan: Treatment options were discussed with Honorio Limon today. We did review her plain films and exam findings. She is now 26 days out from her right foot contusion with right foot 5th metatarsal distal shaft oblique fracture. I would recommend given her increase in diastases over the past week that she utilize her boot consistently for the next 2 weeks. She is a well-controlled diabetic. Natural history of recovering from 5th metatarsal fractures were discussed. I'll like to see her back for an x-ray check in about 2 weeks. If she is doing well and we are seeing callus, we may transition her back to the postop shoe in 2 weeks. She may take Tylenol and ice. She is off work until this heals as she cannot wear a boot or postop shoe to work. She'll be seen back in a couple weeks for repeat foot films and further treatment recommendations but she will contact us the interim with questions or concerns.

## 2019-05-28 ENCOUNTER — OFFICE VISIT (OUTPATIENT)
Dept: FAMILY MEDICINE CLINIC | Age: 74
End: 2019-05-28
Payer: MEDICARE

## 2019-05-28 VITALS
BODY MASS INDEX: 23.56 KG/M2 | DIASTOLIC BLOOD PRESSURE: 68 MMHG | HEART RATE: 78 BPM | WEIGHT: 146.6 LBS | SYSTOLIC BLOOD PRESSURE: 102 MMHG | OXYGEN SATURATION: 99 % | HEIGHT: 66 IN

## 2019-05-28 DIAGNOSIS — E11.65 UNCONTROLLED TYPE 2 DIABETES MELLITUS WITH HYPERGLYCEMIA (HCC): ICD-10-CM

## 2019-05-28 DIAGNOSIS — M81.0 OSTEOPOROSIS, UNSPECIFIED OSTEOPOROSIS TYPE, UNSPECIFIED PATHOLOGICAL FRACTURE PRESENCE: ICD-10-CM

## 2019-05-28 DIAGNOSIS — E78.2 MIXED HYPERLIPIDEMIA: ICD-10-CM

## 2019-05-28 DIAGNOSIS — E03.9 HYPOTHYROIDISM, UNSPECIFIED TYPE: ICD-10-CM

## 2019-05-28 DIAGNOSIS — F32.A DEPRESSION, UNSPECIFIED DEPRESSION TYPE: ICD-10-CM

## 2019-05-28 DIAGNOSIS — E53.8 VITAMIN B12 DEFICIENCY: ICD-10-CM

## 2019-05-28 LAB
ANION GAP SERPL CALCULATED.3IONS-SCNC: 15 MMOL/L (ref 3–16)
BUN BLDV-MCNC: 10 MG/DL (ref 7–20)
CALCIUM SERPL-MCNC: 9 MG/DL (ref 8.3–10.6)
CHLORIDE BLD-SCNC: 99 MMOL/L (ref 99–110)
CHOLESTEROL, TOTAL: 185 MG/DL (ref 0–199)
CO2: 27 MMOL/L (ref 21–32)
CREAT SERPL-MCNC: 0.8 MG/DL (ref 0.6–1.2)
CREATININE URINE: 95.6 MG/DL (ref 28–259)
GFR AFRICAN AMERICAN: >60
GFR NON-AFRICAN AMERICAN: >60
GLUCOSE BLD-MCNC: 155 MG/DL (ref 70–99)
HBA1C MFR BLD: 7.2 %
HDLC SERPL-MCNC: 67 MG/DL (ref 40–60)
LDL CHOLESTEROL CALCULATED: 95 MG/DL
MICROALBUMIN UR-MCNC: <1.2 MG/DL
MICROALBUMIN/CREAT UR-RTO: NORMAL MG/G (ref 0–30)
POTASSIUM SERPL-SCNC: 4.3 MMOL/L (ref 3.5–5.1)
SODIUM BLD-SCNC: 141 MMOL/L (ref 136–145)
T4 FREE: 1.2 NG/DL (ref 0.9–1.8)
TRIGL SERPL-MCNC: 115 MG/DL (ref 0–150)
TSH SERPL DL<=0.05 MIU/L-ACNC: 1.75 UIU/ML (ref 0.27–4.2)
VITAMIN B-12: 835 PG/ML (ref 211–911)
VLDLC SERPL CALC-MCNC: 23 MG/DL

## 2019-05-28 PROCEDURE — G8427 DOCREV CUR MEDS BY ELIG CLIN: HCPCS | Performed by: FAMILY MEDICINE

## 2019-05-28 PROCEDURE — 1090F PRES/ABSN URINE INCON ASSESS: CPT | Performed by: FAMILY MEDICINE

## 2019-05-28 PROCEDURE — 1036F TOBACCO NON-USER: CPT | Performed by: FAMILY MEDICINE

## 2019-05-28 PROCEDURE — 36415 COLL VENOUS BLD VENIPUNCTURE: CPT | Performed by: FAMILY MEDICINE

## 2019-05-28 PROCEDURE — 99214 OFFICE O/P EST MOD 30 MIN: CPT | Performed by: FAMILY MEDICINE

## 2019-05-28 PROCEDURE — 1123F ACP DISCUSS/DSCN MKR DOCD: CPT | Performed by: FAMILY MEDICINE

## 2019-05-28 PROCEDURE — G8420 CALC BMI NORM PARAMETERS: HCPCS | Performed by: FAMILY MEDICINE

## 2019-05-28 PROCEDURE — 83036 HEMOGLOBIN GLYCOSYLATED A1C: CPT | Performed by: FAMILY MEDICINE

## 2019-05-28 PROCEDURE — G8399 PT W/DXA RESULTS DOCUMENT: HCPCS | Performed by: FAMILY MEDICINE

## 2019-05-28 PROCEDURE — 4040F PNEUMOC VAC/ADMIN/RCVD: CPT | Performed by: FAMILY MEDICINE

## 2019-05-28 PROCEDURE — 2022F DILAT RTA XM EVC RTNOPTHY: CPT | Performed by: FAMILY MEDICINE

## 2019-05-28 PROCEDURE — 3017F COLORECTAL CA SCREEN DOC REV: CPT | Performed by: FAMILY MEDICINE

## 2019-05-28 PROCEDURE — 3045F PR MOST RECENT HEMOGLOBIN A1C LEVEL 7.0-9.0%: CPT | Performed by: FAMILY MEDICINE

## 2019-05-28 NOTE — PROGRESS NOTES
Subjective:      Patient ID: Bola Arellano is a 68 y.o. female. HPI   FU for DM/HLD/depression - avg of 75 mcg for at least 2 months, (50mcg from [facility], and 100mcg from us), and diet pretty good (some sweets, cookies, vanilla ice cream, usually no jorgito, ellis food cake), likes some veggies, salads too, just doesn't like to cook them, likes fruits, bananas/pineapples/apples. Last eye exam Feb, all ok, repeat in one year, slight cataracts, Dr. Jessica Gamboa. Oncologist was every 6months, now every 12 months, off the [place], taking citrical (with Vit D) and MVI. Energy overall decent, but sometimes trouble to get all the meds together. Talked to two different people about her meds, and seemed to have difficulty getting her meds ASAP. Balance better. Still R foot boot - one more week for sure, then possibly surgical shoe - Dr. Srinivas Gore. Dr Christa Cano was also recommended, but patient's daughter did not want her to go to him. On the basis of positive falls risk screening, assessment and plan is as follows: patient declines any further evaluation/treatment for increased falls risk. A1c was elevated at 7.2. No foot sores or tingling. Review of Systems   Constitutional: Negative for chills and fever. HENT: Negative for ear pain and hearing loss. Eyes: Negative for pain and visual disturbance. Respiratory: Negative for cough and shortness of breath. Cardiovascular: Negative for chest pain and palpitations. Gastrointestinal: Negative for abdominal pain, constipation, diarrhea and nausea. Genitourinary: Negative for difficulty urinating and dysuria. Neurological: Negative for dizziness, weakness and numbness. Psychiatric/Behavioral: Negative for dysphoric mood. The patient is not nervous/anxious. Objective:   Physical Exam   Constitutional: She is oriented to person, place, and time. She appears well-developed and well-nourished. HENT:   Head: Normocephalic and atraumatic.    Eyes:

## 2019-05-30 ASSESSMENT — ENCOUNTER SYMPTOMS
COUGH: 0
EYE PAIN: 0
NAUSEA: 0
SHORTNESS OF BREATH: 0
ABDOMINAL PAIN: 0
DIARRHEA: 0
CONSTIPATION: 0

## 2019-06-05 ENCOUNTER — OFFICE VISIT (OUTPATIENT)
Dept: ORTHOPEDIC SURGERY | Age: 74
End: 2019-06-05
Payer: MEDICARE

## 2019-06-05 VITALS — HEIGHT: 66 IN | WEIGHT: 146.61 LBS | BODY MASS INDEX: 23.56 KG/M2

## 2019-06-05 DIAGNOSIS — S92.351D CLOSED DISPLACED FRACTURE OF FIFTH METATARSAL BONE OF RIGHT FOOT WITH ROUTINE HEALING, SUBSEQUENT ENCOUNTER: Primary | ICD-10-CM

## 2019-06-05 DIAGNOSIS — M79.671 FOOT PAIN, RIGHT: ICD-10-CM

## 2019-06-05 DIAGNOSIS — S90.31XD CONTUSION OF RIGHT FOOT, SUBSEQUENT ENCOUNTER: ICD-10-CM

## 2019-06-05 PROCEDURE — G8427 DOCREV CUR MEDS BY ELIG CLIN: HCPCS | Performed by: FAMILY MEDICINE

## 2019-06-05 PROCEDURE — G8399 PT W/DXA RESULTS DOCUMENT: HCPCS | Performed by: FAMILY MEDICINE

## 2019-06-05 PROCEDURE — 1123F ACP DISCUSS/DSCN MKR DOCD: CPT | Performed by: FAMILY MEDICINE

## 2019-06-05 PROCEDURE — G8420 CALC BMI NORM PARAMETERS: HCPCS | Performed by: FAMILY MEDICINE

## 2019-06-05 PROCEDURE — 4040F PNEUMOC VAC/ADMIN/RCVD: CPT | Performed by: FAMILY MEDICINE

## 2019-06-05 PROCEDURE — 3017F COLORECTAL CA SCREEN DOC REV: CPT | Performed by: FAMILY MEDICINE

## 2019-06-05 PROCEDURE — 1090F PRES/ABSN URINE INCON ASSESS: CPT | Performed by: FAMILY MEDICINE

## 2019-06-05 PROCEDURE — 1036F TOBACCO NON-USER: CPT | Performed by: FAMILY MEDICINE

## 2019-06-05 PROCEDURE — 99213 OFFICE O/P EST LOW 20 MIN: CPT | Performed by: FAMILY MEDICINE

## 2019-06-05 NOTE — PROGRESS NOTES
Subjective:      Patient ID: Kelly Gallegos is a 68 y.o.  female. Chief Complaint   Patient presents with    Foot Pain     CK RIGHT FOOT      Follow-up right lateral midfoot sprain with oblique mildly displaced fracture right foot fifth metatarsal shaft    HPI:   She is here for an initial evaluation of right foot pain after an  injury- fell OOB. Onset of symptoms 5 days ago. These symptoms have not been progressive in nature. Pain is located over the metatarsal region. Pain is on average 6/10. Pain is worse with weight bearing and the pain improves with elevation. There is not associated numbness/ tingling. Previous treatments have included: ice and use of a tall boot with mild  relief or improvement. 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 Emily Franks who is being seen today in after hours follow-up from 4/29/2019 for evaluation of an injury to her right foot. She states that on 4/24/2019 she was sitting on her bed attempting to put on pantyhose when she lost her balance striking the lateral portion of her right mid foot against a hard wood floor. She believes she felt a pop at the time of the injury and did have immediate pain which did improve. She did swell and developed ecchymosis but initially treated herself with some ice and Tylenol. Due to persistence of pain and difficulty bearing weight laterally, she was seen in after hours her x-rays did reveal an oblique right 5th plantar tarsal shaft fracture. We initially recommended she utilize a boot however she transitioned herself into a postop shoe as it was much more comfortable. She is a reasonably well-controlled diabetic with last A1c was 7.1 per the patient. Her swelling has improved. She is still little reluctant to put weight to the lateral midfoot. Denies locking catching instability numbness or tingling.   She is being seen today for orthopedic and sports consultation with review of her imaging. Grace Wood was last seen in the office 5/6/2019  for   1. Closed displaced fracture of fifth metatarsal bone of right foot with routine healing, subsequent encounter    2. Contusion of right foot, subsequent encounter    3. Foot pain, right     Patient is now 3.5 weeks out from injury onset. Patient reports no pain while in her boot. Patients symptoms have improved with immbolization. Patients treatment to date has included rest, ice, NSAID- meloxicam and immoblization. He is not complaining of rest or night pain and only has 2 out of 10 pain with direct pressure palpation. She was last seen in the office on 5/20/2019 is now 6 weeks out from her injury. Overall she is doing much better. She has been using her boot. She is not really complaining of pain only minimal stiffness. She does have a postop shoe to utilize as well and does take her meloxicam.  Subjectively she has 90+ percent better. I have reviewed and agree with the documentation of the HPI documented by my . I will make any changes if necessary. Enc Date: 6/5/2019  Time: 12:50 PM  Provider: Fidencio Wang MD         Review of Systems:   A 14 point review of systems and history form completed by the patient has been reviewed. This form is scanned in the media tab of the patient's chart under today's date.       Past Medical History:   Diagnosis Date    Allergic rhinitis     Cancer ([facility] [address] 11/2013    right breast    Cervical radicular pain     Depression     Hyperlipidemia     Hypertension     Hypothyroidism     Type II or unspecified type diabetes mellitus without mention of complication, not stated as uncontrolled     Unspecified sprain of left wrist, initial encounter 9/9/2016       Family History   Problem Relation Age of Onset    Diabetes Daughter 5        FLORENTINO (Type I)    Cancer Daughter         breast CA    Cancer Daughter thyroid CA    Cancer Father        Past Surgical History:   Procedure Laterality Date    BREAST SURGERY Right 2013    lumpectomy with sentinel node biopsy    COLONOSCOPY  2017    polyp, diverticulosis    THYROIDECTOMY  2004    thyroid CA  (papillary) - Dr. Rebecca Bravo History     Occupational History    Not on file   Tobacco Use    Smoking status: Former Smoker     Packs/day: 1.50     Years: 10.00     Pack years: 15.00     Last attempt to quit: 1986     Years since quittin.4    Smokeless tobacco: Never Used    Tobacco comment: Quit 2 times total   Substance and Sexual Activity    Alcohol use: Yes     Alcohol/week: 0.0 - 1.2 oz     Comment: occas - 1-2/mo    Drug use: No    Sexual activity: Not Currently       Current Outpatient Medications   Medication Sig Dispense Refill    primidone (MYSOLINE) 50 MG tablet Take 50 mg by mouth 3 times daily      traZODone (DESYREL) 50 MG tablet Take 50 mg by mouth nightly      glucose monitoring kit (FREESTYLE) monitoring kit 1 kit by Does not apply route daily DX: E11.9 (Brand per insurance coverage) 1 kit 0    blood glucose monitor strips TESTS BID  DX: E11.9  Please fill brand per insurance coverage 100 strip 5    Lancets MISC TESTS BID  DX: E11.9  Please fill brand per insurance coverage 100 each 3    blood glucose test strips (ASCENSIA AUTODISC VI;ONE TOUCH ULTRA TEST VI) strip As needed. Testing qd 100 strip 3    alendronate (FOSAMAX) 70 MG tablet Take 1 tablet by mouth every 7 days As directed.  12 tablet 3    metFORMIN (GLUCOPHAGE-XR) 500 MG extended release tablet Take 1 tablet by mouth 2 times daily (with meals) 500 qam and 1000mg qpm 360 tablet 1    simvastatin (ZOCOR) 40 MG tablet TAKE 1 TABLET EVERY NIGHT (FOR HIGH CHOLESTEROL) 90 tablet 1    anastrozole (ARIMIDEX) 1 MG tablet Take 1 tablet by mouth daily 90 tablet 3    levothyroxine (SYNTHROID) 50 MCG tablet Take 1 tablet by mouth daily 90 tablet 0    DULoxetine (CYMBALTA) 60 MG extended release capsule Take 2 capsules by mouth daily (instead of Zoloft). 60 capsule 0    meloxicam (MOBIC) 15 MG tablet Take 15 mg by mouth daily      Probiotic Product (ALIGN) 4 MG CAPS   Take 4 mg by mouth Indications: as needed       polyethylene glycol (MIRALAX) PACK packet   Take 17 g by mouth as needed       multivitamin (THERAGRAN) per tablet Take 1 tablet by mouth daily.  loratadine (CLARITIN) 10 MG tablet   Take 10 mg by mouth daily Indications: as needed       aspirin 81 MG EC tablet Take 81 mg by mouth daily. No current facility-administered medications for this visit. Objective:     She  is alert, oriented x 3, pleasant, well nourished, developed and in no acute distress. Ht 5' 5.98\" (1.676 m)   Wt 146 lb 9.7 oz (66.5 kg)   LMP 02/03/1997   BMI 23.67 kg/m²      Examination of the Right Foot:  There is no residual soft tissue swelling. Resolved ecchymosis distally  There is no obvious deformity. There is now only 0-1 out of 10 bony tenderness to palpation over the fifth metatarsal(s) at the distal shaft. There is no crepitus with palpation. ROM is less limited by pain and/ or swelling. Flexor and extensor tendon function appears to be intact. Rotational defect or scissoring. NEUROLOGICAL EXAM:  Examination of the lower extremities are intact with sensation to light touch to the foot and digits. Able to move all digits. VASCULAR EXAM:  Examination of the lower extremities shows intact perfusion to both extremities. No cyanosis, digits are warm to touch, capillary refill is less than 2 seconds. SKIN:  Examination of the skin reveals the skin to be intact without lacerations, abrasions, significant erythema, rashes or skin lesions. X Rays: performed in the office today:   AP, Lateral and Oblique of the Right Foot:  Radiographs demonstrate normal bony alignment of the foot.  There is radiographic evidence of a fracture through the mid shaft of the 5th metatarsal. The alignment is mildly angulated. She is not exhibiting worsening diastases and she is showing very good callus formation. Overall alignment except acceptable. Assessment:       ICD-10-CM    1. Closed displaced fracture of fifth metatarsal bone of right foot with routine healing, subsequent encounter S92.351D XR FOOT RIGHT (MIN 3 VIEWS)   2. Contusion of right foot, subsequent encounter S90.31XD XR FOOT RIGHT (MIN 3 VIEWS)   3. Foot pain, right M79.671 XR FOOT RIGHT (MIN 3 VIEWS)        Plan:     Plan: Treatment options were discussed with Didi Zamarripa today. We did review her plain films and exam findings. She is now 6 weeks out from her right foot contusion with right foot 5th metatarsal distal shaft oblique fracture. Clinically this time she is doing well and I would feel comfortable waiting the boot and to the postop shoe. She was instructed on some range of motion and gentle stretching exercises. If she does well with this she can do some light strengthening as well. We will see her back in a few weeks for three-view foot films. We will ascertain need for formal therapy at that time. She is a well-controlled diabetic. She may take Tylenol and ice as needed. She will contact us in the interim with questions or concerns. We will see her back in a few weeks for repeat three-view foot films.

## 2019-07-03 ENCOUNTER — OFFICE VISIT (OUTPATIENT)
Dept: ORTHOPEDIC SURGERY | Age: 74
End: 2019-07-03
Payer: MEDICARE

## 2019-07-03 VITALS — WEIGHT: 146.61 LBS | HEIGHT: 66 IN | BODY MASS INDEX: 23.56 KG/M2

## 2019-07-03 DIAGNOSIS — S90.31XD CONTUSION OF RIGHT FOOT, SUBSEQUENT ENCOUNTER: ICD-10-CM

## 2019-07-03 DIAGNOSIS — S92.351D CLOSED DISPLACED FRACTURE OF FIFTH METATARSAL BONE OF RIGHT FOOT WITH ROUTINE HEALING, SUBSEQUENT ENCOUNTER: ICD-10-CM

## 2019-07-03 DIAGNOSIS — M79.671 RIGHT FOOT PAIN: Primary | ICD-10-CM

## 2019-07-03 PROCEDURE — G8420 CALC BMI NORM PARAMETERS: HCPCS | Performed by: FAMILY MEDICINE

## 2019-07-03 PROCEDURE — 99213 OFFICE O/P EST LOW 20 MIN: CPT | Performed by: FAMILY MEDICINE

## 2019-07-03 PROCEDURE — 1090F PRES/ABSN URINE INCON ASSESS: CPT | Performed by: FAMILY MEDICINE

## 2019-07-03 PROCEDURE — 3017F COLORECTAL CA SCREEN DOC REV: CPT | Performed by: FAMILY MEDICINE

## 2019-07-03 PROCEDURE — G8399 PT W/DXA RESULTS DOCUMENT: HCPCS | Performed by: FAMILY MEDICINE

## 2019-07-03 PROCEDURE — G8428 CUR MEDS NOT DOCUMENT: HCPCS | Performed by: FAMILY MEDICINE

## 2019-07-03 PROCEDURE — 1123F ACP DISCUSS/DSCN MKR DOCD: CPT | Performed by: FAMILY MEDICINE

## 2019-07-03 PROCEDURE — 1036F TOBACCO NON-USER: CPT | Performed by: FAMILY MEDICINE

## 2019-07-03 PROCEDURE — 4040F PNEUMOC VAC/ADMIN/RCVD: CPT | Performed by: FAMILY MEDICINE

## 2019-07-03 NOTE — PROGRESS NOTES
Subjective:      Patient ID: Aniyah Cooper is a 68 y.o.  female. Chief Complaint   Patient presents with    Foot Pain     CK RIGHT FOOT      Follow-up right lateral midfoot sprain with oblique mildly displaced fracture right foot fifth metatarsal shaft    HPI:   She is here for an initial evaluation of right foot pain after an  injury- fell OOB. Onset of symptoms 5 days ago. These symptoms have not been progressive in nature. Pain is located over the metatarsal region. Pain is on average 6/10. Pain is worse with weight bearing and the pain improves with elevation. There is not associated numbness/ tingling. Previous treatments have included: ice and use of a tall boot with mild  relief or improvement. Sabrina Guidry is a very pleasant reasonably active white female who does work part-time as a plant nursery assistant and is a very nice patient of Dr. Jose Garcia who is being seen today in after hours follow-up from 4/29/2019 for evaluation of an injury to her right foot. She states that on 4/24/2019 she was sitting on her bed attempting to put on pantyhose when she lost her balance striking the lateral portion of her right mid foot against a hard wood floor. She believes she felt a pop at the time of the injury and did have immediate pain which did improve. She did swell and developed ecchymosis but initially treated herself with some ice and Tylenol. Due to persistence of pain and difficulty bearing weight laterally, she was seen in after hours her x-rays did reveal an oblique right 5th plantar tarsal shaft fracture. We initially recommended she utilize a boot however she transitioned herself into a postop shoe as it was much more comfortable. She is a reasonably well-controlled diabetic with last A1c was 7.1 per the patient. Her swelling has improved. She is still little reluctant to put weight to the lateral midfoot. Denies locking catching instability numbness or tingling.   She is being seen

## 2019-07-20 DIAGNOSIS — E78.2 MIXED HYPERLIPIDEMIA: ICD-10-CM

## 2019-07-22 DIAGNOSIS — E03.9 HYPOTHYROIDISM, UNSPECIFIED TYPE: Primary | ICD-10-CM

## 2019-07-22 RX ORDER — SIMVASTATIN 40 MG
TABLET ORAL
Qty: 90 TABLET | Refills: 1 | Status: SHIPPED | OUTPATIENT
Start: 2019-07-22 | End: 2020-02-05

## 2019-07-24 RX ORDER — LEVOTHYROXINE SODIUM 0.05 MG/1
50 TABLET ORAL DAILY
Qty: 90 TABLET | Refills: 3 | Status: SHIPPED | OUTPATIENT
Start: 2019-07-24 | End: 2020-04-30

## 2019-08-28 ENCOUNTER — OFFICE VISIT (OUTPATIENT)
Dept: ORTHOPEDIC SURGERY | Age: 74
End: 2019-08-28
Payer: MEDICARE

## 2019-08-28 VITALS — HEIGHT: 66 IN | BODY MASS INDEX: 23.56 KG/M2 | WEIGHT: 146.61 LBS

## 2019-08-28 DIAGNOSIS — M79.671 RIGHT FOOT PAIN: Primary | ICD-10-CM

## 2019-08-28 DIAGNOSIS — S92.351D CLOSED DISPLACED FRACTURE OF FIFTH METATARSAL BONE OF RIGHT FOOT WITH ROUTINE HEALING, SUBSEQUENT ENCOUNTER: ICD-10-CM

## 2019-08-28 PROCEDURE — 4040F PNEUMOC VAC/ADMIN/RCVD: CPT | Performed by: FAMILY MEDICINE

## 2019-08-28 PROCEDURE — 1036F TOBACCO NON-USER: CPT | Performed by: FAMILY MEDICINE

## 2019-08-28 PROCEDURE — G8427 DOCREV CUR MEDS BY ELIG CLIN: HCPCS | Performed by: FAMILY MEDICINE

## 2019-08-28 PROCEDURE — 3017F COLORECTAL CA SCREEN DOC REV: CPT | Performed by: FAMILY MEDICINE

## 2019-08-28 PROCEDURE — 1123F ACP DISCUSS/DSCN MKR DOCD: CPT | Performed by: FAMILY MEDICINE

## 2019-08-28 PROCEDURE — 1090F PRES/ABSN URINE INCON ASSESS: CPT | Performed by: FAMILY MEDICINE

## 2019-08-28 PROCEDURE — G8399 PT W/DXA RESULTS DOCUMENT: HCPCS | Performed by: FAMILY MEDICINE

## 2019-08-28 PROCEDURE — G8420 CALC BMI NORM PARAMETERS: HCPCS | Performed by: FAMILY MEDICINE

## 2019-08-28 PROCEDURE — 99213 OFFICE O/P EST LOW 20 MIN: CPT | Performed by: FAMILY MEDICINE

## 2019-09-07 ENCOUNTER — OFFICE VISIT (OUTPATIENT)
Dept: ORTHOPEDIC SURGERY | Age: 74
End: 2019-09-07
Payer: MEDICARE

## 2019-09-07 VITALS — WEIGHT: 146 LBS | HEIGHT: 65 IN | BODY MASS INDEX: 24.32 KG/M2

## 2019-09-07 DIAGNOSIS — M25.562 ACUTE PAIN OF LEFT KNEE: Primary | ICD-10-CM

## 2019-09-07 DIAGNOSIS — S82.035A CLOSED NONDISPLACED TRANSVERSE FRACTURE OF LEFT PATELLA, INITIAL ENCOUNTER: ICD-10-CM

## 2019-09-07 DIAGNOSIS — S82.002A CLOSED NONDISPLACED FRACTURE OF LEFT PATELLA, UNSPECIFIED FRACTURE MORPHOLOGY, INITIAL ENCOUNTER: ICD-10-CM

## 2019-09-07 PROCEDURE — 1123F ACP DISCUSS/DSCN MKR DOCD: CPT | Performed by: PHYSICIAN ASSISTANT

## 2019-09-07 PROCEDURE — 3017F COLORECTAL CA SCREEN DOC REV: CPT | Performed by: PHYSICIAN ASSISTANT

## 2019-09-07 PROCEDURE — 4040F PNEUMOC VAC/ADMIN/RCVD: CPT | Performed by: PHYSICIAN ASSISTANT

## 2019-09-07 PROCEDURE — 99214 OFFICE O/P EST MOD 30 MIN: CPT | Performed by: PHYSICIAN ASSISTANT

## 2019-09-07 PROCEDURE — G8428 CUR MEDS NOT DOCUMENT: HCPCS | Performed by: PHYSICIAN ASSISTANT

## 2019-09-07 PROCEDURE — 1090F PRES/ABSN URINE INCON ASSESS: CPT | Performed by: PHYSICIAN ASSISTANT

## 2019-09-07 PROCEDURE — L1830 KO IMMOB CANVAS LONG PRE OTS: HCPCS | Performed by: PHYSICIAN ASSISTANT

## 2019-09-07 PROCEDURE — 1036F TOBACCO NON-USER: CPT | Performed by: PHYSICIAN ASSISTANT

## 2019-09-07 PROCEDURE — G8420 CALC BMI NORM PARAMETERS: HCPCS | Performed by: PHYSICIAN ASSISTANT

## 2019-09-07 PROCEDURE — G8399 PT W/DXA RESULTS DOCUMENT: HCPCS | Performed by: PHYSICIAN ASSISTANT

## 2019-09-07 NOTE — PROGRESS NOTES
CHIEF COMPLAINT:    Chief Complaint   Patient presents with    Knee Pain     L knee pain; fall today-hit knee on rock       HISTORY OF PRESENT ILLNESS:                The patient is a 68 y.o. female this patient presents today for orthopedic evaluation of her LEFT knee. Earlier today she fell onto a rock. She points to the anterior knee as a source of her pain.   She is in Dr. Medina Baca for a RIGHT foot injury which happened earlier this year  Past Medical History:   Diagnosis Date    Allergic rhinitis     [facility]    right [facility]    Cervical radicular pain     Depression     Hyperlipidemia     Hypertension     Hypothyroidism     Type II or unspecified type diabetes mellitus without mention of complication, not stated as uncontrolled     Unspecified sprain of left wrist, initial encounter 9/9/2016            The pain assessment was noted & is as follows:  Pain Assessment  Location of Pain: Knee  Location Modifiers: Left  Severity of Pain: 6  Quality of Pain: Other (Comment)  Duration of Pain: Persistent  Frequency of Pain: Constant  Date Pain First Started: 09/07/19  Aggravating Factors: Standing, Walking, Other (Comment)  Limiting Behavior: Yes  Relieving Factors: Rest  Result of Injury: Yes  Work-Related Injury: No  Are there other pain locations you wish to document?: No]      Work Status/Functionality:     Past Medical History: Medical history form was reviewed today & can be found in the media tab  Past Medical History:   Diagnosis Date    Allergic rhinitis     [facility]    right breast    Cervical radicular pain     Depression     Hyperlipidemia     Hypertension     Hypothyroidism     Type II or unspecified type diabetes mellitus without mention of complication, not stated as uncontrolled     Unspecified sprain of left wrist, initial encounter 9/9/2016      Past Surgical History:     Past Surgical History:   Procedure Laterality Date    BREAST SURGERY Right 12/9/2013 intact    · Special Tests: ACL PCL MCL and LCL feels stable          · Skin:  There are no rashes, ulcerations or lesions. · There are no dysvascular changes     Gait & station: Wheelchair      Additional Examinations:        Right Lower Extremity: Examination of the right lower extremity does not show any tenderness, deformity or injury. Range of motion is unremarkable. There is no gross instability. There are no rashes, ulcerations or lesions. Strength and tone are normal.      Diagnostic Testing: The following x rays were read and interpreted by myself      1.  3 x-ray views of the LEFT knee demonstrate nondisplaced inferior patellar sleeve fracture    Orders     Orders Placed This Encounter   Procedures    XR KNEE LEFT (3 VIEWS)     Standing Status:   Future     Number of Occurrences:   1     Standing Expiration Date:   10/7/2019    Breg Knee Immobilizer Brace     Patient was prescribed a Breg Knee Immobilizer. The left knee will require stabilization / immobilization from this semi-rigid / rigid orthosis to improve their function. The orthosis will assist in protecting the affected area, provide functional support and facilitate healing. The prefabricated orthosis was modified in the following manner to provide a customizable fit for the patient at the time of delivery. 1. Identification of appropriate positioning and alignment of anatomical landmarks. 2. Trimming of straps and panels. Reassemble orthosis to specifically fit patient. The patient was educated and fit by a healthcare professional with expert knowledge and specialization in brace application while under the direct supervision of the treating physician. Verbal and written instructions for the use of and application of this item were provided. They were instructed to contact the office immediately should the brace result in increased pain, decreased sensation, increased swelling or worsening of the condition. Assessment / Treatment Plan:     1. LEFT knee patellar fracture: Knee immobilizer and walker.   Follow-up with  in 1 week for reevaluation

## 2019-09-12 ENCOUNTER — HOSPITAL ENCOUNTER (OUTPATIENT)
Dept: WOMENS IMAGING | Age: 74
Discharge: HOME | End: 2019-09-12
Payer: MEDICARE

## 2019-09-12 DIAGNOSIS — Z12.31 VISIT FOR SCREENING MAMMOGRAM: ICD-10-CM

## 2019-09-12 PROCEDURE — 77067 SCR MAMMO BI INCL CAD: CPT

## 2019-09-16 ENCOUNTER — OFFICE VISIT (OUTPATIENT)
Dept: ORTHOPEDIC SURGERY | Age: 74
End: 2019-09-16
Payer: MEDICARE

## 2019-09-16 VITALS
HEART RATE: 91 BPM | HEIGHT: 65 IN | DIASTOLIC BLOOD PRESSURE: 67 MMHG | BODY MASS INDEX: 24.32 KG/M2 | SYSTOLIC BLOOD PRESSURE: 97 MMHG | WEIGHT: 145.94 LBS

## 2019-09-16 DIAGNOSIS — S80.02XA CONTUSION OF LEFT KNEE, INITIAL ENCOUNTER: ICD-10-CM

## 2019-09-16 DIAGNOSIS — M25.562 LEFT KNEE PAIN, UNSPECIFIED CHRONICITY: Primary | ICD-10-CM

## 2019-09-16 DIAGNOSIS — S82.035A CLOSED NONDISPLACED TRANSVERSE FRACTURE OF LEFT PATELLA, INITIAL ENCOUNTER: ICD-10-CM

## 2019-09-16 PROCEDURE — 99214 OFFICE O/P EST MOD 30 MIN: CPT | Performed by: FAMILY MEDICINE

## 2019-09-16 PROCEDURE — G8399 PT W/DXA RESULTS DOCUMENT: HCPCS | Performed by: FAMILY MEDICINE

## 2019-09-16 PROCEDURE — 3017F COLORECTAL CA SCREEN DOC REV: CPT | Performed by: FAMILY MEDICINE

## 2019-09-16 PROCEDURE — G8420 CALC BMI NORM PARAMETERS: HCPCS | Performed by: FAMILY MEDICINE

## 2019-09-16 PROCEDURE — 4040F PNEUMOC VAC/ADMIN/RCVD: CPT | Performed by: FAMILY MEDICINE

## 2019-09-16 PROCEDURE — 1036F TOBACCO NON-USER: CPT | Performed by: FAMILY MEDICINE

## 2019-09-16 PROCEDURE — 1123F ACP DISCUSS/DSCN MKR DOCD: CPT | Performed by: FAMILY MEDICINE

## 2019-09-16 PROCEDURE — G8427 DOCREV CUR MEDS BY ELIG CLIN: HCPCS | Performed by: FAMILY MEDICINE

## 2019-09-16 PROCEDURE — 1090F PRES/ABSN URINE INCON ASSESS: CPT | Performed by: FAMILY MEDICINE

## 2019-09-16 NOTE — PROGRESS NOTES
Chief Complaint  Knee Pain [facility] 9/7/19 LEFT KNEE)    Evaluation acute onset left knee pain status post fall 9/7/2019    Parvez Liriano is a 69 y.o. female who is a reasonably active white female who is well-known to us is a very nice patient of Dr. Jennifer Martinez whom we just saw last month for remote follow-up for a moderately displaced right fifth metatarsal shaft fracture which had completely healed. History of Present Illness for New Patient:     Patient is being seen today for acute left knee pain. 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 She is wearing jeans at the time and did not really feel a pop but did have immediate pain. She developed bruising and swelling with limited ability to bear weight and was evaluated in after-hours on 9/7/2019 and found to have a nondisplaced left patellar sleeve fracture inferiorly at the patella. There is no evidence of extensor mechanism disruption she was placed on a walker and given a knee immobilizer. . She describes the symptoms as aching and sharp with attempted flexion. The pain is located inferior pole of the patella and the patient rates their current pain as a 1 out of 10 on the pain scale. This problem has been an issue for 9 days. The problem is worse in the morning or is constant or is associated with stiffness and limited ability to flex. She has only mild soreness with attempted weightbearing in her knee immobilizer on the walker. No substantial night pain. . Patient has other associated symptoms: Achiness with sharp pain with attempted flexion. . Patient has attempted rest, ice, acetaminophen-as needed as well as her walker in the immobilizer. To treat this problem and symptoms are are improving.  Patient does not have a previous history of left

## 2019-09-23 ENCOUNTER — OFFICE VISIT (OUTPATIENT)
Dept: ORTHOPEDIC SURGERY | Age: 74
End: 2019-09-23
Payer: MEDICARE

## 2019-09-23 VITALS
WEIGHT: 145.94 LBS | DIASTOLIC BLOOD PRESSURE: 72 MMHG | HEIGHT: 65 IN | SYSTOLIC BLOOD PRESSURE: 124 MMHG | BODY MASS INDEX: 24.32 KG/M2 | HEART RATE: 70 BPM

## 2019-09-23 DIAGNOSIS — M25.562 LEFT KNEE PAIN, UNSPECIFIED CHRONICITY: Primary | ICD-10-CM

## 2019-09-23 DIAGNOSIS — S80.02XD CONTUSION OF LEFT KNEE, SUBSEQUENT ENCOUNTER: ICD-10-CM

## 2019-09-23 DIAGNOSIS — S82.035D CLOSED NONDISPLACED TRANSVERSE FRACTURE OF LEFT PATELLA WITH ROUTINE HEALING, SUBSEQUENT ENCOUNTER: ICD-10-CM

## 2019-09-23 PROCEDURE — 1090F PRES/ABSN URINE INCON ASSESS: CPT | Performed by: FAMILY MEDICINE

## 2019-09-23 PROCEDURE — G8428 CUR MEDS NOT DOCUMENT: HCPCS | Performed by: FAMILY MEDICINE

## 2019-09-23 PROCEDURE — 99213 OFFICE O/P EST LOW 20 MIN: CPT | Performed by: FAMILY MEDICINE

## 2019-09-23 PROCEDURE — 3017F COLORECTAL CA SCREEN DOC REV: CPT | Performed by: FAMILY MEDICINE

## 2019-09-23 PROCEDURE — 4040F PNEUMOC VAC/ADMIN/RCVD: CPT | Performed by: FAMILY MEDICINE

## 2019-09-23 PROCEDURE — 1123F ACP DISCUSS/DSCN MKR DOCD: CPT | Performed by: FAMILY MEDICINE

## 2019-09-23 PROCEDURE — G8420 CALC BMI NORM PARAMETERS: HCPCS | Performed by: FAMILY MEDICINE

## 2019-09-23 PROCEDURE — 1036F TOBACCO NON-USER: CPT | Performed by: FAMILY MEDICINE

## 2019-09-23 PROCEDURE — G8399 PT W/DXA RESULTS DOCUMENT: HCPCS | Performed by: FAMILY MEDICINE

## 2019-09-23 NOTE — PROGRESS NOTES
Exam: Anterolateral right knee exam is benign. Right Lower Extremity: Examination of the right lower extremity does not show any tenderness, deformity or injury. Range of motion is unremarkable. There is no gross instability. There are no rashes, ulcerations or lesions. Strength and tone are normal.  Left Lower Extremity: Examination of the left lower extremity does not show any tenderness, deformity or injury. Range of motion is unremarkable. There is no gross instability. There are no rashes, ulcerations or lesions. Strength and tone are normal.        Diagnostic Test Findings:   AP and lateral left knee films does show stable appearance to a transverse mid body patellar fracture without worsening displacement or high-grade articular step-off. Assessment & Plan:    Encounter Diagnoses   Name Primary?  Left knee pain, unspecified chronicity Yes    Contusion of left knee, subsequent encounter     Closed nondisplaced transverse fracture of left patella with routine healing, subsequent encounter        Orders Placed This Encounter   Procedures    XR KNEE LEFT (1-2 VIEWS)     Standing Status:   Future     Number of Occurrences:   1     Standing Expiration Date:   9/23/2020            Treatment Plan:  Treatment options were discussed withLynette Bruno. We did review her plain films and exam findings. She is now 16 days out from her knee contusion with mid body patellar intra-articular transverse fracture. She may continue with her knee immobilizer ice and take Tylenol. She was encouraged to utilize a walker or cane based on pain although she recently has been able to bear weight fully and was strongly cautioned against flexing the knee. 2 view AP and lateral knee films next visit. This dictation was performed with a verbal recognition program (DRAGON) and it was checked for errors. It is possible that there are still dictated errors within this office note.  If so, please bring any errors to

## 2019-09-30 ENCOUNTER — OFFICE VISIT (OUTPATIENT)
Dept: ORTHOPEDIC SURGERY | Age: 74
End: 2019-09-30
Payer: MEDICARE

## 2019-09-30 VITALS — BODY MASS INDEX: 24.32 KG/M2 | WEIGHT: 145.94 LBS | HEIGHT: 65 IN

## 2019-09-30 DIAGNOSIS — S80.02XD CONTUSION OF LEFT KNEE, SUBSEQUENT ENCOUNTER: ICD-10-CM

## 2019-09-30 DIAGNOSIS — S82.035D CLOSED NONDISPLACED TRANSVERSE FRACTURE OF LEFT PATELLA WITH ROUTINE HEALING, SUBSEQUENT ENCOUNTER: Primary | ICD-10-CM

## 2019-09-30 DIAGNOSIS — M25.562 LEFT KNEE PAIN, UNSPECIFIED CHRONICITY: ICD-10-CM

## 2019-09-30 PROCEDURE — 1036F TOBACCO NON-USER: CPT | Performed by: FAMILY MEDICINE

## 2019-09-30 PROCEDURE — 99213 OFFICE O/P EST LOW 20 MIN: CPT | Performed by: FAMILY MEDICINE

## 2019-09-30 PROCEDURE — 3017F COLORECTAL CA SCREEN DOC REV: CPT | Performed by: FAMILY MEDICINE

## 2019-09-30 PROCEDURE — G8399 PT W/DXA RESULTS DOCUMENT: HCPCS | Performed by: FAMILY MEDICINE

## 2019-09-30 PROCEDURE — 4040F PNEUMOC VAC/ADMIN/RCVD: CPT | Performed by: FAMILY MEDICINE

## 2019-09-30 PROCEDURE — 1123F ACP DISCUSS/DSCN MKR DOCD: CPT | Performed by: FAMILY MEDICINE

## 2019-09-30 PROCEDURE — 1090F PRES/ABSN URINE INCON ASSESS: CPT | Performed by: FAMILY MEDICINE

## 2019-09-30 PROCEDURE — G8427 DOCREV CUR MEDS BY ELIG CLIN: HCPCS | Performed by: FAMILY MEDICINE

## 2019-09-30 PROCEDURE — G8420 CALC BMI NORM PARAMETERS: HCPCS | Performed by: FAMILY MEDICINE

## 2019-09-30 NOTE — PROGRESS NOTES
Chief Complaint  Knee Pain (LEFT)      Wendy Peter is a 69 y.o. female who is a very pleasant active white female well-known to me patient of Dr. Sofi Rodriguez who is being seen back today for follow-up on her left knee transverse intra-articular patellar fracture. History of Present Illness for Follow Up Patient:      Gabriel Edwards is being seen in follow up today for acute left knee pain. Gabriel Edwards is 23 days out from initial injury on 2019. The patient rates their current pain as a 3 out of 10 on the pain scale. Gabriel Edwards has attempted rest, ice, acetaminophen- Tylenol, and a knee immobolizer to treat this problem and symptoms are improving. Aggravating factors include bending her knee. Wendy Peter reports a 30 % improvement in symptoms. She is here for an x-ray check. No substantial rest or night pain. Pain Assessment  Location of Pain: Knee  Location Modifiers: Left  Severity of Pain: 3  Aggravating Factors: Bending, Walking, Stairs  Limiting Behavior: Some  Relieving Factors: Rest, Ice  Result of Injury: Yes  Work-Related Injury: No  Are there other pain locations you wish to document?: No     Attest: I have reviewed and attest the documentation of the HPI documented by my . I will make any changes if necessary. Enc Date: 2019  Time: 3:29 PM  Provider: Kinga Epps MD        Social History     Tobacco Use    Smoking status: Former Smoker     Packs/day: 1.50     Years: 10.00     Pack years: 15.00     Last attempt to quit: 1986     Years since quittin.7    Smokeless tobacco: Never Used    Tobacco comment: Quit 2 times total   Substance Use Topics    Alcohol use: Yes     Alcohol/week: 0.0 - 2.0 standard drinks     Comment: occas - 1-2/mo    Drug use: No        Review of Systems  Pertinent items are noted in HPI  Review of systems reviewed from Patient History Form dated on 2019 and available in the patient's chart under the Media tab.        Vital Signs     Ht 5' 5\" (1.651 m)   Wt 145 lb 15.1 oz (66.2 kg)   LMP 02/03/1997   BMI 24.29 kg/m²       General Exam:   Constitutional: Patient is adequately groomed with no evidence of malnutrition  DTRs: Deep tendon reflexes are intact  Mental Status: The patient is oriented to time, place and person. The patient's mood and affect are appropriate. Lymphatic: The lymphatic examination bilaterally reveals all areas to be without enlargement or induration. Vascular: Examination reveals no swelling or calf tenderness. Peripheral pulses are palpable and 2+. Neurological: The patient has good coordination. There is no weakness or sensory deficit. Left knee examination     Inspection: There is no high-grade deformity and swelling has improved. Ecchymosis resolving. No tense effusion.     Palpation:  She seems to have less clinical tenderness to the mid body of the patella which she rates at 3-4 out of 10. No high-grade bony step-offs.     Rang of Motion: She is able to completely extend there is no evidence of extensor mechanism disruption. Flexion limited to about 70 today.     Strength: Pain limited extension 4- out of 5. Flexion 5 out of 5.     Special Tests: Clinical tenderness over mid body of the patella. Negative Nissa's. No evidence of distal quad rupture. No instability. Screening hip testing is benign.     Skin: There are no rashes, ulcerations or lesions. Distal motor sensory and vascular exam is intact.     Gait:  Less altalgia. She is walking and bearing weight fully in her knee immobilizer currently.     Reflex symmetrically preserved          Additional Comments:                 Additional Examinations:     Contralateral Exam: Anterolateral right knee exam is benign. Right Lower Extremity: Examination of the right lower extremity does not show any tenderness, deformity or injury. Range of motion is unremarkable. There is no gross instability. There are no rashes, ulcerations or lesions.

## 2019-10-09 ENCOUNTER — OFFICE VISIT (OUTPATIENT)
Dept: ORTHOPEDIC SURGERY | Age: 74
End: 2019-10-09
Payer: MEDICARE

## 2019-10-09 VITALS
HEART RATE: 78 BPM | DIASTOLIC BLOOD PRESSURE: 78 MMHG | SYSTOLIC BLOOD PRESSURE: 124 MMHG | HEIGHT: 65 IN | BODY MASS INDEX: 24.32 KG/M2 | WEIGHT: 145.94 LBS

## 2019-10-09 DIAGNOSIS — S82.035D CLOSED NONDISPLACED TRANSVERSE FRACTURE OF LEFT PATELLA WITH ROUTINE HEALING, SUBSEQUENT ENCOUNTER: Primary | ICD-10-CM

## 2019-10-09 PROCEDURE — 99213 OFFICE O/P EST LOW 20 MIN: CPT | Performed by: FAMILY MEDICINE

## 2019-10-09 PROCEDURE — 4040F PNEUMOC VAC/ADMIN/RCVD: CPT | Performed by: FAMILY MEDICINE

## 2019-10-09 PROCEDURE — G8484 FLU IMMUNIZE NO ADMIN: HCPCS | Performed by: FAMILY MEDICINE

## 2019-10-09 PROCEDURE — L1812 KO ELASTIC W/JOINTS PRE OTS: HCPCS | Performed by: FAMILY MEDICINE

## 2019-10-09 PROCEDURE — 1090F PRES/ABSN URINE INCON ASSESS: CPT | Performed by: FAMILY MEDICINE

## 2019-10-09 PROCEDURE — G8399 PT W/DXA RESULTS DOCUMENT: HCPCS | Performed by: FAMILY MEDICINE

## 2019-10-09 PROCEDURE — 3017F COLORECTAL CA SCREEN DOC REV: CPT | Performed by: FAMILY MEDICINE

## 2019-10-09 PROCEDURE — G8427 DOCREV CUR MEDS BY ELIG CLIN: HCPCS | Performed by: FAMILY MEDICINE

## 2019-10-09 PROCEDURE — G8420 CALC BMI NORM PARAMETERS: HCPCS | Performed by: FAMILY MEDICINE

## 2019-10-09 PROCEDURE — 1123F ACP DISCUSS/DSCN MKR DOCD: CPT | Performed by: FAMILY MEDICINE

## 2019-10-09 PROCEDURE — 1036F TOBACCO NON-USER: CPT | Performed by: FAMILY MEDICINE

## 2019-10-23 ENCOUNTER — OFFICE VISIT (OUTPATIENT)
Dept: ORTHOPEDIC SURGERY | Age: 74
End: 2019-10-23
Payer: MEDICARE

## 2019-10-23 VITALS — HEIGHT: 65 IN | BODY MASS INDEX: 24.32 KG/M2 | WEIGHT: 145.94 LBS

## 2019-10-23 DIAGNOSIS — M25.562 ACUTE PAIN OF LEFT KNEE: ICD-10-CM

## 2019-10-23 DIAGNOSIS — S82.035D CLOSED NONDISPLACED TRANSVERSE FRACTURE OF LEFT PATELLA WITH ROUTINE HEALING, SUBSEQUENT ENCOUNTER: ICD-10-CM

## 2019-10-23 DIAGNOSIS — M25.562 LEFT KNEE PAIN, UNSPECIFIED CHRONICITY: Primary | ICD-10-CM

## 2019-10-23 DIAGNOSIS — S80.02XD CONTUSION OF LEFT KNEE, SUBSEQUENT ENCOUNTER: ICD-10-CM

## 2019-10-23 PROCEDURE — 1123F ACP DISCUSS/DSCN MKR DOCD: CPT | Performed by: FAMILY MEDICINE

## 2019-10-23 PROCEDURE — 4040F PNEUMOC VAC/ADMIN/RCVD: CPT | Performed by: FAMILY MEDICINE

## 2019-10-23 PROCEDURE — 3017F COLORECTAL CA SCREEN DOC REV: CPT | Performed by: FAMILY MEDICINE

## 2019-10-23 PROCEDURE — 1090F PRES/ABSN URINE INCON ASSESS: CPT | Performed by: FAMILY MEDICINE

## 2019-10-23 PROCEDURE — G8420 CALC BMI NORM PARAMETERS: HCPCS | Performed by: FAMILY MEDICINE

## 2019-10-23 PROCEDURE — 1036F TOBACCO NON-USER: CPT | Performed by: FAMILY MEDICINE

## 2019-10-23 PROCEDURE — G8427 DOCREV CUR MEDS BY ELIG CLIN: HCPCS | Performed by: FAMILY MEDICINE

## 2019-10-23 PROCEDURE — G8399 PT W/DXA RESULTS DOCUMENT: HCPCS | Performed by: FAMILY MEDICINE

## 2019-10-23 PROCEDURE — 99213 OFFICE O/P EST LOW 20 MIN: CPT | Performed by: FAMILY MEDICINE

## 2019-10-23 PROCEDURE — G8484 FLU IMMUNIZE NO ADMIN: HCPCS | Performed by: FAMILY MEDICINE

## 2019-10-31 ENCOUNTER — HOSPITAL ENCOUNTER (OUTPATIENT)
Dept: PHYSICAL THERAPY | Age: 74
Setting detail: THERAPIES SERIES
Discharge: HOME | End: 2019-10-31
Payer: MEDICARE

## 2019-10-31 PROCEDURE — 97161 PT EVAL LOW COMPLEX 20 MIN: CPT | Performed by: PHYSICAL THERAPIST

## 2019-10-31 PROCEDURE — 97110 THERAPEUTIC EXERCISES: CPT | Performed by: PHYSICAL THERAPIST

## 2019-11-08 ENCOUNTER — HOSPITAL ENCOUNTER (OUTPATIENT)
Dept: PHYSICAL THERAPY | Age: 74
Setting detail: THERAPIES SERIES
Discharge: HOME | End: 2019-11-08
Payer: MEDICARE

## 2019-11-08 PROCEDURE — 97110 THERAPEUTIC EXERCISES: CPT | Performed by: PHYSICAL THERAPIST

## 2019-11-15 ENCOUNTER — HOSPITAL ENCOUNTER (OUTPATIENT)
Dept: PHYSICAL THERAPY | Age: 74
Setting detail: THERAPIES SERIES
Discharge: HOME | End: 2019-11-15
Payer: MEDICARE

## 2019-11-15 PROCEDURE — 97110 THERAPEUTIC EXERCISES: CPT | Performed by: PHYSICAL THERAPIST

## 2019-11-20 ENCOUNTER — OFFICE VISIT (OUTPATIENT)
Dept: ORTHOPEDIC SURGERY | Age: 74
End: 2019-11-20
Payer: MEDICARE

## 2019-11-20 VITALS — BODY MASS INDEX: 24.32 KG/M2 | HEIGHT: 65 IN | WEIGHT: 145.94 LBS

## 2019-11-20 DIAGNOSIS — S82.035D CLOSED NONDISPLACED TRANSVERSE FRACTURE OF LEFT PATELLA WITH ROUTINE HEALING, SUBSEQUENT ENCOUNTER: ICD-10-CM

## 2019-11-20 DIAGNOSIS — M25.562 LEFT KNEE PAIN, UNSPECIFIED CHRONICITY: Primary | ICD-10-CM

## 2019-11-20 PROBLEM — S82.036D CLOSED NONDISPLACED TRANSVERSE FRACTURE OF PATELLA WITH ROUTINE HEALING: Status: ACTIVE | Noted: 2019-09-16

## 2019-11-20 PROCEDURE — 1090F PRES/ABSN URINE INCON ASSESS: CPT | Performed by: FAMILY MEDICINE

## 2019-11-20 PROCEDURE — G8484 FLU IMMUNIZE NO ADMIN: HCPCS | Performed by: FAMILY MEDICINE

## 2019-11-20 PROCEDURE — 3017F COLORECTAL CA SCREEN DOC REV: CPT | Performed by: FAMILY MEDICINE

## 2019-11-20 PROCEDURE — 1123F ACP DISCUSS/DSCN MKR DOCD: CPT | Performed by: FAMILY MEDICINE

## 2019-11-20 PROCEDURE — 1036F TOBACCO NON-USER: CPT | Performed by: FAMILY MEDICINE

## 2019-11-20 PROCEDURE — G8399 PT W/DXA RESULTS DOCUMENT: HCPCS | Performed by: FAMILY MEDICINE

## 2019-11-20 PROCEDURE — G8427 DOCREV CUR MEDS BY ELIG CLIN: HCPCS | Performed by: FAMILY MEDICINE

## 2019-11-20 PROCEDURE — 4040F PNEUMOC VAC/ADMIN/RCVD: CPT | Performed by: FAMILY MEDICINE

## 2019-11-20 PROCEDURE — G8420 CALC BMI NORM PARAMETERS: HCPCS | Performed by: FAMILY MEDICINE

## 2019-11-20 PROCEDURE — 99213 OFFICE O/P EST LOW 20 MIN: CPT | Performed by: FAMILY MEDICINE

## 2019-11-22 ENCOUNTER — HOSPITAL ENCOUNTER (OUTPATIENT)
Dept: PHYSICAL THERAPY | Age: 74
Setting detail: THERAPIES SERIES
Discharge: HOME | End: 2019-11-22
Payer: MEDICARE

## 2019-11-22 PROCEDURE — 97110 THERAPEUTIC EXERCISES: CPT | Performed by: PHYSICAL THERAPIST

## 2019-11-25 ENCOUNTER — OFFICE VISIT (OUTPATIENT)
Dept: FAMILY MEDICINE CLINIC | Age: 74
End: 2019-11-25
Payer: MEDICARE

## 2019-11-25 VITALS
HEIGHT: 65 IN | WEIGHT: 138.2 LBS | DIASTOLIC BLOOD PRESSURE: 84 MMHG | OXYGEN SATURATION: 98 % | BODY MASS INDEX: 23.03 KG/M2 | SYSTOLIC BLOOD PRESSURE: 120 MMHG | TEMPERATURE: 98 F | HEART RATE: 75 BPM

## 2019-11-25 DIAGNOSIS — E78.2 MIXED HYPERLIPIDEMIA: ICD-10-CM

## 2019-11-25 DIAGNOSIS — F32.A DEPRESSION, UNSPECIFIED DEPRESSION TYPE: ICD-10-CM

## 2019-11-25 DIAGNOSIS — E03.9 HYPOTHYROIDISM, UNSPECIFIED TYPE: ICD-10-CM

## 2019-11-25 DIAGNOSIS — M81.0 OSTEOPOROSIS, UNSPECIFIED OSTEOPOROSIS TYPE, UNSPECIFIED PATHOLOGICAL FRACTURE PRESENCE: ICD-10-CM

## 2019-11-25 LAB
ANION GAP SERPL CALCULATED.3IONS-SCNC: 12 MMOL/L (ref 3–16)
BUN BLDV-MCNC: 7 MG/DL (ref 7–20)
CALCIUM SERPL-MCNC: 9.4 MG/DL (ref 8.3–10.6)
CHLORIDE BLD-SCNC: 101 MMOL/L (ref 99–110)
CHOLESTEROL, TOTAL: 223 MG/DL (ref 0–199)
CO2: 28 MMOL/L (ref 21–32)
CREAT SERPL-MCNC: 0.8 MG/DL (ref 0.6–1.2)
GFR AFRICAN AMERICAN: >60
GFR NON-AFRICAN AMERICAN: >60
GLUCOSE BLD-MCNC: 238 MG/DL (ref 70–99)
HBA1C MFR BLD: 7.1 %
HDLC SERPL-MCNC: 79 MG/DL (ref 40–60)
LDL CHOLESTEROL CALCULATED: 108 MG/DL
POTASSIUM SERPL-SCNC: 4.2 MMOL/L (ref 3.5–5.1)
SODIUM BLD-SCNC: 141 MMOL/L (ref 136–145)
T4 FREE: 0.6 NG/DL (ref 0.9–1.8)
TRIGL SERPL-MCNC: 181 MG/DL (ref 0–150)
TSH SERPL DL<=0.05 MIU/L-ACNC: 53.79 UIU/ML (ref 0.27–4.2)
VLDLC SERPL CALC-MCNC: 36 MG/DL

## 2019-11-25 PROCEDURE — 2022F DILAT RTA XM EVC RTNOPTHY: CPT | Performed by: FAMILY MEDICINE

## 2019-11-25 PROCEDURE — 1123F ACP DISCUSS/DSCN MKR DOCD: CPT | Performed by: FAMILY MEDICINE

## 2019-11-25 PROCEDURE — 3017F COLORECTAL CA SCREEN DOC REV: CPT | Performed by: FAMILY MEDICINE

## 2019-11-25 PROCEDURE — 83036 HEMOGLOBIN GLYCOSYLATED A1C: CPT | Performed by: FAMILY MEDICINE

## 2019-11-25 PROCEDURE — G8482 FLU IMMUNIZE ORDER/ADMIN: HCPCS | Performed by: FAMILY MEDICINE

## 2019-11-25 PROCEDURE — G8420 CALC BMI NORM PARAMETERS: HCPCS | Performed by: FAMILY MEDICINE

## 2019-11-25 PROCEDURE — 1036F TOBACCO NON-USER: CPT | Performed by: FAMILY MEDICINE

## 2019-11-25 PROCEDURE — G8427 DOCREV CUR MEDS BY ELIG CLIN: HCPCS | Performed by: FAMILY MEDICINE

## 2019-11-25 PROCEDURE — 99214 OFFICE O/P EST MOD 30 MIN: CPT | Performed by: FAMILY MEDICINE

## 2019-11-25 PROCEDURE — 1090F PRES/ABSN URINE INCON ASSESS: CPT | Performed by: FAMILY MEDICINE

## 2019-11-25 PROCEDURE — G8399 PT W/DXA RESULTS DOCUMENT: HCPCS | Performed by: FAMILY MEDICINE

## 2019-11-25 PROCEDURE — 4040F PNEUMOC VAC/ADMIN/RCVD: CPT | Performed by: FAMILY MEDICINE

## 2019-11-25 PROCEDURE — 36415 COLL VENOUS BLD VENIPUNCTURE: CPT | Performed by: FAMILY MEDICINE

## 2019-11-25 RX ORDER — METFORMIN HYDROCHLORIDE 500 MG/1
500 TABLET, EXTENDED RELEASE ORAL 2 TIMES DAILY WITH MEALS
Qty: 360 TABLET | Refills: 1 | Status: SHIPPED | OUTPATIENT
Start: 2019-11-25 | End: 2019-12-09 | Stop reason: SDUPTHER

## 2019-11-25 RX ORDER — TRAZODONE HYDROCHLORIDE 50 MG/1
50 TABLET ORAL NIGHTLY
Qty: 90 TABLET | Refills: 1 | Status: SHIPPED | OUTPATIENT
Start: 2019-11-25 | End: 2020-04-30 | Stop reason: SDUPTHER

## 2019-11-25 RX ORDER — ALENDRONATE SODIUM 70 MG/1
70 TABLET ORAL
Qty: 12 TABLET | Refills: 3 | Status: SHIPPED | OUTPATIENT
Start: 2019-11-25

## 2019-11-26 ASSESSMENT — ENCOUNTER SYMPTOMS
DIARRHEA: 0
NAUSEA: 0
SHORTNESS OF BREATH: 0
ABDOMINAL PAIN: 0
EYE PAIN: 0
COUGH: 0
FACIAL SWELLING: 0
CONSTIPATION: 0

## 2019-12-09 ENCOUNTER — TELEPHONE (OUTPATIENT)
Dept: FAMILY MEDICINE CLINIC | Age: 74
End: 2019-12-09

## 2019-12-09 DIAGNOSIS — E03.9 HYPOTHYROIDISM, UNSPECIFIED TYPE: Primary | ICD-10-CM

## 2019-12-09 DIAGNOSIS — E78.2 MIXED HYPERLIPIDEMIA: ICD-10-CM

## 2019-12-09 RX ORDER — METFORMIN HYDROCHLORIDE 500 MG/1
1000 TABLET, EXTENDED RELEASE ORAL 2 TIMES DAILY WITH MEALS
Qty: 360 TABLET | Refills: 1 | Status: SHIPPED | OUTPATIENT
Start: 2019-12-09 | End: 2020-06-25 | Stop reason: SDUPTHER

## 2020-01-08 ENCOUNTER — OFFICE VISIT (OUTPATIENT)
Dept: ORTHOPEDIC SURGERY | Age: 75
End: 2020-01-08
Payer: MEDICARE

## 2020-01-08 VITALS — HEIGHT: 65 IN | WEIGHT: 138.23 LBS | BODY MASS INDEX: 23.03 KG/M2

## 2020-01-08 PROCEDURE — G8420 CALC BMI NORM PARAMETERS: HCPCS | Performed by: FAMILY MEDICINE

## 2020-01-08 PROCEDURE — 1090F PRES/ABSN URINE INCON ASSESS: CPT | Performed by: FAMILY MEDICINE

## 2020-01-08 PROCEDURE — 1036F TOBACCO NON-USER: CPT | Performed by: FAMILY MEDICINE

## 2020-01-08 PROCEDURE — 99213 OFFICE O/P EST LOW 20 MIN: CPT | Performed by: FAMILY MEDICINE

## 2020-01-08 PROCEDURE — G8399 PT W/DXA RESULTS DOCUMENT: HCPCS | Performed by: FAMILY MEDICINE

## 2020-01-08 PROCEDURE — G8427 DOCREV CUR MEDS BY ELIG CLIN: HCPCS | Performed by: FAMILY MEDICINE

## 2020-01-08 PROCEDURE — 3017F COLORECTAL CA SCREEN DOC REV: CPT | Performed by: FAMILY MEDICINE

## 2020-01-08 PROCEDURE — 1123F ACP DISCUSS/DSCN MKR DOCD: CPT | Performed by: FAMILY MEDICINE

## 2020-01-08 PROCEDURE — 4040F PNEUMOC VAC/ADMIN/RCVD: CPT | Performed by: FAMILY MEDICINE

## 2020-01-08 PROCEDURE — G8482 FLU IMMUNIZE ORDER/ADMIN: HCPCS | Performed by: FAMILY MEDICINE

## 2020-01-08 NOTE — PROGRESS NOTES
Chief Complaint  Knee Pain (CK LEFT KNEE)      Sheri Darnell is a 69 y.o. female who is a very pleasant active white female well-known to me patient of Dr. Saida Maldonado who is being seen back today for follow-up on her left knee transverse intra-articular patellar fracture. History of Present Illness for Follow Up Patient:      Lynette is being seen in follow up today for acute left knee pain. Lynette is 4.5 months out from initial injury 2019. The patient rates their current pain as a 0 out of 10 on the pain scale. Activities aggravating current symptoms occasionally going up and down stairs but this is fairly minor at only 1-2 out of 10 and resolves after climbing the stairs. No swelling locking catching or true instability symptoms noted. . She did walk about 2 miles at the [facility] recently without issue. Lynette has attempted rest, NSAID- Aleve, physical therapy, home exercises, and bracing to treat this problem and symptoms are improving. Lynette Ornelas reports a 97 % improvement in symptoms. Additionally, she recently completed a balance class at [facility]. Pain Assessment  Location of Pain: Knee  Location Modifiers: Left  Severity of Pain: 0  Date Pain First Started: 19  Result of Injury: Yes     Attest: I have reviewed and attest the documentation of the HPI documented by my . I will make any changes if necessary. Enc Date: 2020  Time: 10:54 AM  Provider: Mignon Galo MD        Social History     Tobacco Use    Smoking status: Former Smoker     Packs/day: 1.50     Years: 10.00     Pack years: 15.00     Last attempt to quit: 1986     Years since quittin.0    Smokeless tobacco: Never Used    Tobacco comment: Quit 2 times total   Substance Use Topics    Alcohol use:  Yes     Alcohol/week: 0.0 - 2.0 standard drinks     Comment: occas - 1-2/mo    Drug use: No        Review of Systems  Pertinent items are noted in HPI  Review of systems knee exam is benign. Right Lower Extremity: Examination of the right lower extremity does not show any tenderness, deformity or injury.  Range of motion is unremarkable. Dorothe Ink is no gross instability.  There are no rashes, ulcerations or lesions.  Strength and tone are normal.  Left Lower Extremity: Examination of the left lower extremity does not show any tenderness, deformity or injury.  Range of motion is unremarkable. Dorothe Ink is no gross instability.  There are no rashes, ulcerations or lesions.  Strength and tone are normal.          Diagnostic Test Findings:   AP and lateral left knee films does show stable appearance to a transverse mid body patellar fracture without worsening displacement or high-grade articular step-off.    Excellent callus formation noted. Her fracture appears to have fully consolidated.         Assessment & Plan:    Encounter Diagnosis   Name Primary?  Closed nondisplaced transverse fracture of left patella with routine healing, subsequent encounter Yes       Orders Placed This Encounter   Procedures    XR KNEE LEFT (1-2 VIEWS)     Standing Status:   Future     Number of Occurrences:   1     Standing Expiration Date:   2/8/2020         Treatment Plan:  Treatment options were discussed with Marko Islas did review her updated plain films and exam findings.  She is now 17.5 weeks out from her knee contusion with mid body patellar intra-articular transverse fracture.  She is doing much better and rates her improvement at 98 %.  She may Use of her patellar stabilizing brace periodically and has concluded both physical therapy and her balance therapy. She is not really requiring any medications at this time. I suspect the majority of her mild residual pain with stairclimbing is related to her underlying mild knee osteoarthritis and chondromalacia patella. Potential for Visco supplementation down the road was discussed. Her back pain symptoms have resolved.     I think we can see her back as needed but she will contact us in the interim with questions or concerns. This dictation was performed with a verbal recognition program (DRAGON) and it was checked for errors. It is possible that there are still dictated errors within this office note. If so, please bring any errors to my attention for an addendum. All efforts were made to ensure that this office note is accurate.

## 2020-01-30 ENCOUNTER — NURSE ONLY (OUTPATIENT)
Dept: FAMILY MEDICINE CLINIC | Age: 75
End: 2020-01-30
Payer: MEDICARE

## 2020-01-30 LAB
T4 FREE: 0.6 NG/DL (ref 0.9–1.8)
TSH SERPL DL<=0.05 MIU/L-ACNC: 45.98 UIU/ML (ref 0.27–4.2)

## 2020-01-30 PROCEDURE — 36415 COLL VENOUS BLD VENIPUNCTURE: CPT | Performed by: FAMILY MEDICINE

## 2020-02-03 NOTE — TELEPHONE ENCOUNTER
Nae Fuentes is requesting refill(s) simvastatin  Last OV 11/25/19 (pertaining to medication)  LR 7/22/19 (per medication requested)  Next office visit scheduled or attempted No   If no, reason:

## 2020-02-05 RX ORDER — SIMVASTATIN 40 MG
TABLET ORAL
Qty: 90 TABLET | Refills: 1 | Status: SHIPPED | OUTPATIENT
Start: 2020-02-05 | End: 2020-06-25 | Stop reason: SDUPTHER

## 2020-02-10 RX ORDER — LEVOTHYROXINE SODIUM 0.12 MG/1
125 TABLET ORAL DAILY
Qty: 90 TABLET | Refills: 1 | Status: SHIPPED | OUTPATIENT
Start: 2020-02-10 | End: 2020-04-30 | Stop reason: SDUPTHER

## 2020-03-12 ENCOUNTER — TELEPHONE (OUTPATIENT)
Dept: FAMILY MEDICINE CLINIC | Age: 75
End: 2020-03-12

## 2020-03-12 NOTE — TELEPHONE ENCOUNTER
Patient was fine yesterday and hit hard this morning with the following:  Upper respiratory, chest tight, sore throat and tight, body aches, chills and fatigue. No travel outside of state or country. She does work with the public three days a week. Appt. ?  ER?

## 2020-03-13 ENCOUNTER — OFFICE VISIT (OUTPATIENT)
Dept: FAMILY MEDICINE CLINIC | Age: 75
End: 2020-03-13
Payer: MEDICARE

## 2020-03-13 ENCOUNTER — TELEPHONE (OUTPATIENT)
Dept: FAMILY MEDICINE CLINIC | Age: 75
End: 2020-03-13

## 2020-03-13 VITALS
OXYGEN SATURATION: 98 % | HEIGHT: 65 IN | RESPIRATION RATE: 16 BRPM | WEIGHT: 144.6 LBS | BODY MASS INDEX: 24.09 KG/M2 | HEART RATE: 77 BPM | SYSTOLIC BLOOD PRESSURE: 100 MMHG | DIASTOLIC BLOOD PRESSURE: 70 MMHG | TEMPERATURE: 98.6 F

## 2020-03-13 LAB
INFLUENZA A ANTIGEN, POC: NEGATIVE
INFLUENZA B ANTIGEN, POC: NEGATIVE

## 2020-03-13 PROCEDURE — 4040F PNEUMOC VAC/ADMIN/RCVD: CPT | Performed by: PHYSICIAN ASSISTANT

## 2020-03-13 PROCEDURE — G8399 PT W/DXA RESULTS DOCUMENT: HCPCS | Performed by: PHYSICIAN ASSISTANT

## 2020-03-13 PROCEDURE — 1090F PRES/ABSN URINE INCON ASSESS: CPT | Performed by: PHYSICIAN ASSISTANT

## 2020-03-13 PROCEDURE — G8420 CALC BMI NORM PARAMETERS: HCPCS | Performed by: PHYSICIAN ASSISTANT

## 2020-03-13 PROCEDURE — 3017F COLORECTAL CA SCREEN DOC REV: CPT | Performed by: PHYSICIAN ASSISTANT

## 2020-03-13 PROCEDURE — 1123F ACP DISCUSS/DSCN MKR DOCD: CPT | Performed by: PHYSICIAN ASSISTANT

## 2020-03-13 PROCEDURE — 87804 INFLUENZA ASSAY W/OPTIC: CPT | Performed by: PHYSICIAN ASSISTANT

## 2020-03-13 PROCEDURE — 1036F TOBACCO NON-USER: CPT | Performed by: PHYSICIAN ASSISTANT

## 2020-03-13 PROCEDURE — 99213 OFFICE O/P EST LOW 20 MIN: CPT | Performed by: PHYSICIAN ASSISTANT

## 2020-03-13 PROCEDURE — G8427 DOCREV CUR MEDS BY ELIG CLIN: HCPCS | Performed by: PHYSICIAN ASSISTANT

## 2020-03-13 PROCEDURE — G8482 FLU IMMUNIZE ORDER/ADMIN: HCPCS | Performed by: PHYSICIAN ASSISTANT

## 2020-03-13 RX ORDER — TRAZODONE HYDROCHLORIDE 50 MG/1
50 TABLET ORAL NIGHTLY
Qty: 90 TABLET | Refills: 1 | Status: CANCELLED | OUTPATIENT
Start: 2020-03-13

## 2020-04-16 ENCOUNTER — TELEPHONE (OUTPATIENT)
Dept: FAMILY MEDICINE CLINIC | Age: 75
End: 2020-04-16

## 2020-04-30 ENCOUNTER — VIRTUAL VISIT (OUTPATIENT)
Dept: FAMILY MEDICINE CLINIC | Age: 75
End: 2020-04-30
Payer: MEDICARE

## 2020-04-30 VITALS — HEIGHT: 65 IN | BODY MASS INDEX: 24.16 KG/M2 | WEIGHT: 145 LBS

## 2020-04-30 PROBLEM — M25.562 LEFT KNEE PAIN: Status: RESOLVED | Noted: 2019-09-16 | Resolved: 2020-04-30

## 2020-04-30 PROBLEM — G47.09 OTHER INSOMNIA: Status: ACTIVE | Noted: 2020-04-30

## 2020-04-30 PROBLEM — M79.671 RIGHT FOOT PAIN: Status: RESOLVED | Noted: 2019-05-06 | Resolved: 2020-04-30

## 2020-04-30 PROBLEM — S82.036D CLOSED NONDISPLACED TRANSVERSE FRACTURE OF PATELLA WITH ROUTINE HEALING: Status: RESOLVED | Noted: 2019-09-16 | Resolved: 2020-04-30

## 2020-04-30 PROBLEM — S42.201D CLOSED FRACTURE OF PROXIMAL END OF RIGHT HUMERUS WITH ROUTINE HEALING: Status: RESOLVED | Noted: 2017-11-02 | Resolved: 2020-04-30

## 2020-04-30 PROBLEM — F33.1 MODERATE EPISODE OF RECURRENT MAJOR DEPRESSIVE DISORDER (HCC): Status: RESOLVED | Noted: 2018-05-11 | Resolved: 2020-04-30

## 2020-04-30 PROBLEM — S92.351A CLOSED DISPLACED FRACTURE OF FIFTH METATARSAL BONE OF RIGHT FOOT: Status: RESOLVED | Noted: 2019-05-06 | Resolved: 2020-04-30

## 2020-04-30 PROBLEM — G25.0 BENIGN ESSENTIAL TREMOR: Status: ACTIVE | Noted: 2020-04-30

## 2020-04-30 PROBLEM — S90.31XA CONTUSION OF RIGHT FOOT: Status: RESOLVED | Noted: 2019-05-06 | Resolved: 2020-04-30

## 2020-04-30 PROBLEM — S80.02XA CONTUSION OF LEFT KNEE: Status: RESOLVED | Noted: 2019-09-16 | Resolved: 2020-04-30

## 2020-04-30 PROCEDURE — G0438 PPPS, INITIAL VISIT: HCPCS | Performed by: FAMILY MEDICINE

## 2020-04-30 PROCEDURE — 4040F PNEUMOC VAC/ADMIN/RCVD: CPT | Performed by: FAMILY MEDICINE

## 2020-04-30 PROCEDURE — 1123F ACP DISCUSS/DSCN MKR DOCD: CPT | Performed by: FAMILY MEDICINE

## 2020-04-30 PROCEDURE — 3017F COLORECTAL CA SCREEN DOC REV: CPT | Performed by: FAMILY MEDICINE

## 2020-04-30 RX ORDER — PRIMIDONE 50 MG/1
50 TABLET ORAL 3 TIMES DAILY
Qty: 90 TABLET | Refills: 1 | Status: SHIPPED | OUTPATIENT
Start: 2020-04-30 | End: 2020-06-25 | Stop reason: SDUPTHER

## 2020-04-30 RX ORDER — TRAZODONE HYDROCHLORIDE 50 MG/1
50 TABLET ORAL NIGHTLY
Qty: 90 TABLET | Refills: 1 | Status: SHIPPED | OUTPATIENT
Start: 2020-04-30

## 2020-04-30 RX ORDER — LEVOTHYROXINE SODIUM 0.12 MG/1
125 TABLET ORAL DAILY
Qty: 90 TABLET | Refills: 1 | Status: SHIPPED | OUTPATIENT
Start: 2020-04-30 | End: 2020-06-25 | Stop reason: SDUPTHER

## 2020-04-30 RX ORDER — LANOLIN ALCOHOL/MO/W.PET/CERES
1000 CREAM (GRAM) TOPICAL DAILY
COMMUNITY
End: 2020-10-07

## 2020-04-30 ASSESSMENT — LIFESTYLE VARIABLES
HOW MANY STANDARD DRINKS CONTAINING ALCOHOL DO YOU HAVE ON A TYPICAL DAY: 0
HOW OFTEN DO YOU HAVE SIX OR MORE DRINKS ON ONE OCCASION: 0
HOW OFTEN DURING THE LAST YEAR HAVE YOU NEEDED AN ALCOHOLIC DRINK FIRST THING IN THE MORNING TO GET YOURSELF GOING AFTER A NIGHT OF HEAVY DRINKING: 0
HOW OFTEN DURING THE LAST YEAR HAVE YOU FAILED TO DO WHAT WAS NORMALLY EXPECTED FROM YOU BECAUSE OF DRINKING: 0
HAVE YOU OR SOMEONE ELSE BEEN INJURED AS A RESULT OF YOUR DRINKING: 0
AUDIT-C TOTAL SCORE: 3
HOW OFTEN DURING THE LAST YEAR HAVE YOU BEEN UNABLE TO REMEMBER WHAT HAPPENED THE NIGHT BEFORE BECAUSE YOU HAD BEEN DRINKING: 0
HOW OFTEN DO YOU HAVE A DRINK CONTAINING ALCOHOL: 3
HAS A RELATIVE, FRIEND, DOCTOR, OR ANOTHER HEALTH PROFESSIONAL EXPRESSED CONCERN ABOUT YOUR DRINKING OR SUGGESTED YOU CUT DOWN: 0
AUDIT TOTAL SCORE: 3
HOW OFTEN DURING THE LAST YEAR HAVE YOU FOUND THAT YOU WERE NOT ABLE TO STOP DRINKING ONCE YOU HAD STARTED: 0
HOW OFTEN DURING THE LAST YEAR HAVE YOU HAD A FEELING OF GUILT OR REMORSE AFTER DRINKING: 0

## 2020-04-30 ASSESSMENT — PATIENT HEALTH QUESTIONNAIRE - PHQ9
SUM OF ALL RESPONSES TO PHQ QUESTIONS 1-9: 0
SUM OF ALL RESPONSES TO PHQ QUESTIONS 1-9: 0

## 2020-04-30 NOTE — PROGRESS NOTES
Medicare Annual Wellness Visit  Name: Gemma Cuevas Date: 2020   MRN: <[id number]> Sex: Female   Age: 69 y.o. Ethnicity: Non-/Non    : 1945 Race: Shante Gurrola is here for Medicare AWV (Patient is completing a Medicare Annual Wellness Visit today, this is her first visit with Dr. Ruddy Duvall)    Screenings for behavioral, psychosocial and functional/safety risks, and cognitive dysfunction are all negative except as indicated below. These results, as well as other patient data from the [address] form, are documented in Flowsheets linked to this Encounter. Allergies   Allergen Reactions    Ciprofloxacin Nausea Only    Naprosyn [Naproxen]      lymphadenopathy    Other Hives    Paxil [Paroxetine] Hives    Pravachol [Pravastatin Sodium]      Neck,shoulder pain    Pravastatin Hives    Prozac [Fluoxetine Hcl]      Headaches      Wellbutrin [Bupropion Hcl]      Tinnitus/ \"laser tag in brain\"    Amoxicillin Rash    Ampicillin Rash    Lipitor Rash    Sulfa Antibiotics Rash       Prior to Visit Medications    Medication Sig Taking? Authorizing Provider   primidone (MYSOLINE) 50 MG tablet Take 1 tablet by mouth 3 times daily Yes Sylvia Zarate, DO   traZODone (DESYREL) 50 MG tablet Take 1 tablet by mouth nightly Yes Sylvia Zarate,    levothyroxine (SYNTHROID) 125 MCG tablet Take 1 tablet by mouth daily (instead of the 100mcg daily dose).  Yes Sylvia Zarate DO   vitamin B-12 (CYANOCOBALAMIN) 1000 MCG tablet Take 1,000 mcg by mouth daily Yes Historical Provider, MD   Calcium Carbonate-Vitamin D (CALCIUM-VITAMIN D3 PO) Take 1 capsule by mouth daily Yes Historical Provider, MD   simvastatin (ZOCOR) 40 MG tablet TAKE 1 TABLET EVERY NIGHT FOR HIGH CHOLESTEROL Yes Ranjan Dickerson MD   metFORMIN (GLUCOPHAGE-XR) 500 MG extended release tablet Take 2 tablets by mouth 2 times daily (with meals) Yes Amelie West MD   glucose monitoring kit (FREEYLE) sprain of left wrist, initial encounter 9/9/2016       Past Surgical History:   Procedure Laterality Date    BREAST SURGERY Right 12/9/2013    lumpectomy with sentinel node biopsy    COLONOSCOPY  02/21/2017    polyp, diverticulosis    THYROIDECTOMY  9/2004    thyroid CA  (papillary) - Dr. David Sanon    TUBAL LIGATION         Family History   Problem Relation Age of Onset    Diabetes Daughter 5        FLORENTINO (Type I)   [place] Cancer Daughter         breast CA    Cancer Daughter         thyroid CA    Cancer Father        CareTeam (Including outside providers/suppliers regularly involved in providing care):   Patient Care Team:  Asim Ahmadi DO as PCP - General (Family Medicine)  Suzanna Swanson MD as PCP - Hematology/Oncology (Hematology and Oncology)  WILL Mckinney as PCP - [facility] Empaneled Provider  Sergo Funes MD (Orthopedic Surgery)  Gayathri Hillman MD as Consulting Physician (Radiation Oncology)  Suzanna Swanson MD as Consulting Physician (Hematology and Oncology)  Manoj Damon MD as Consulting Physician (Sports Medicine)    Wt Readings from Last 3 Encounters:   04/30/20 145 lb (65.8 kg)   03/13/20 144 lb 9.6 oz (65.6 kg)   01/08/20 138 lb 3.7 oz (62.7 kg)     Vitals:    04/30/20 0856   Weight: 145 lb (65.8 kg)   Height: 5' 5\" (1.651 m)     Body mass index is 24.13 kg/m². Based upon direct observation of the patient, evaluation of cognition reveals recent and remote memory intact. Patient's complete Health Risk Assessment and screening values have been reviewed and are found in Flowsheets. The following problems were reviewed today and where indicated follow up appointments were made and/or referrals ordered.     Positive Risk Factor Screenings with Interventions:     Fall Risk:  2 or more falls in past year?: (!) yes(fell in 4/2019 and broke foot, and again in September 9/2019 and broke knee cap)  Fall with injury in past year?: (!) yes(9/2019 fell and broke knee cap)  Fall Risk Interventions: · Home safety tips provided    General Health:  General  In general, how would you say your health is?: Good  In the past 7 days, have you experienced any of the following?  New or Increased Pain, New or Increased Fatigue, Loneliness, Social Isolation, Stress or Anger?: (!) Social Isolation  Do you get the social and emotional support that you need?: Yes  Do you have a Living Will?: Yes  General Health Risk Interventions:  · Social isolation: due to covid 19 pandemic     Hearing/Vision:  No exam data present  Hearing/Vision  Do you or your family notice any trouble with your hearing?: (!) Yes(wears a hearing aide)  Do you have difficulty driving, watching TV, or doing any of your daily activities because of your eyesight?: No  Have you had an eye exam within the past year?: (!) No  Hearing/Vision Interventions:  · Hearing concerns:  audiology referral provided   · Eye exam delayed due to pandemic    Personalized Preventive Plan   Current Health Maintenance Status  Immunization History   Administered Date(s) Administered    Influenza Vaccine, unspecified formulation 09/14/2015, 11/15/2016, 10/01/2017    Influenza Virus Vaccine 10/01/2014, 11/15/2016, 10/01/2018    Influenza Whole 10/01/2014    Influenza, High Dose (Fluzone 65 yrs and older) 10/02/2019    Influenza, Quadv, IM, (6 mo and older Fluzone, Flulaval, Fluarix and 3 yrs and older Afluria) 10/01/2017, 10/01/2018    Pneumococcal Conjugate 13-valent ([id number]) 01/04/2016    Pneumococcal Polysaccharide ([id number]) 01/16/2014    Tdap (Boostrix, Adacel) 09/28/2017        Health Maintenance   Topic Date Due    Shingles Vaccine (1 of 2) 09/14/1996    Diabetic retinal exam  02/23/2019    Annual Wellness Visit (AWV)  05/29/2019    Colon Cancer Screen FIT/FOBT  07/16/2019    Diabetic microalbuminuria test  05/28/2020    Breast cancer screen  09/12/2020    A1C test (Diabetic or Prediabetic)  11/25/2020    Lipid screen  11/25/2020    Diabetic foot

## 2020-06-25 ENCOUNTER — OFFICE VISIT (OUTPATIENT)
Dept: FAMILY MEDICINE CLINIC | Age: 75
End: 2020-06-25
Payer: MEDICARE

## 2020-06-25 VITALS
TEMPERATURE: 98.1 F | BODY MASS INDEX: 23.13 KG/M2 | OXYGEN SATURATION: 99 % | SYSTOLIC BLOOD PRESSURE: 130 MMHG | HEART RATE: 88 BPM | WEIGHT: 138.8 LBS | DIASTOLIC BLOOD PRESSURE: 78 MMHG | HEIGHT: 65 IN

## 2020-06-25 LAB — HBA1C MFR BLD: 7.7 %

## 2020-06-25 PROCEDURE — G8399 PT W/DXA RESULTS DOCUMENT: HCPCS | Performed by: FAMILY MEDICINE

## 2020-06-25 PROCEDURE — 1090F PRES/ABSN URINE INCON ASSESS: CPT | Performed by: FAMILY MEDICINE

## 2020-06-25 PROCEDURE — 36415 COLL VENOUS BLD VENIPUNCTURE: CPT | Performed by: FAMILY MEDICINE

## 2020-06-25 PROCEDURE — G8420 CALC BMI NORM PARAMETERS: HCPCS | Performed by: FAMILY MEDICINE

## 2020-06-25 PROCEDURE — G8427 DOCREV CUR MEDS BY ELIG CLIN: HCPCS | Performed by: FAMILY MEDICINE

## 2020-06-25 PROCEDURE — 4040F PNEUMOC VAC/ADMIN/RCVD: CPT | Performed by: FAMILY MEDICINE

## 2020-06-25 PROCEDURE — 3017F COLORECTAL CA SCREEN DOC REV: CPT | Performed by: FAMILY MEDICINE

## 2020-06-25 PROCEDURE — 1036F TOBACCO NON-USER: CPT | Performed by: FAMILY MEDICINE

## 2020-06-25 PROCEDURE — 2022F DILAT RTA XM EVC RTNOPTHY: CPT | Performed by: FAMILY MEDICINE

## 2020-06-25 PROCEDURE — 83036 HEMOGLOBIN GLYCOSYLATED A1C: CPT | Performed by: FAMILY MEDICINE

## 2020-06-25 PROCEDURE — 99214 OFFICE O/P EST MOD 30 MIN: CPT | Performed by: FAMILY MEDICINE

## 2020-06-25 PROCEDURE — 1123F ACP DISCUSS/DSCN MKR DOCD: CPT | Performed by: FAMILY MEDICINE

## 2020-06-25 PROCEDURE — 3051F HG A1C>EQUAL 7.0%<8.0%: CPT | Performed by: FAMILY MEDICINE

## 2020-06-25 RX ORDER — METFORMIN HYDROCHLORIDE 500 MG/1
1000 TABLET, EXTENDED RELEASE ORAL 2 TIMES DAILY WITH MEALS
Qty: 360 TABLET | Refills: 1 | Status: SHIPPED | OUTPATIENT
Start: 2020-06-25

## 2020-06-25 RX ORDER — TRAZODONE HYDROCHLORIDE 50 MG/1
50 TABLET ORAL NIGHTLY
Qty: 90 TABLET | Refills: 1 | Status: CANCELLED | OUTPATIENT
Start: 2020-06-25

## 2020-06-25 RX ORDER — SIMVASTATIN 40 MG
TABLET ORAL
Qty: 90 TABLET | Refills: 1 | Status: SHIPPED | OUTPATIENT
Start: 2020-06-25

## 2020-06-25 RX ORDER — LEVOTHYROXINE SODIUM 0.12 MG/1
125 TABLET ORAL DAILY
Qty: 90 TABLET | Refills: 1 | Status: SHIPPED | OUTPATIENT
Start: 2020-06-25 | End: 2020-06-29 | Stop reason: DRUGHIGH

## 2020-06-25 RX ORDER — PRIMIDONE 50 MG/1
50 TABLET ORAL 2 TIMES DAILY
Qty: 180 TABLET | Refills: 1 | Status: SHIPPED | OUTPATIENT
Start: 2020-06-25

## 2020-06-25 NOTE — PROGRESS NOTES
no guarding or rebound. Musculoskeletal: Normal range of motion. General: No tenderness. Skin:     General: Skin is warm. Coloration: Skin is not jaundiced or pale. Findings: No bruising or erythema. Neurological:      General: No focal deficit present. Mental Status: She is alert and oriented to person, place, and time. Cranial Nerves: No cranial nerve deficit. Sensory: No sensory deficit. Motor: No weakness. Coordination: Coordination normal.   Psychiatric:         Mood and Affect: Mood normal.         Behavior: Behavior normal.         Thought Content: Thought content normal.         Judgment: Judgment normal.         Assessment:      DM- controlled  hld- on statin, check labs  Hypothyroid- euthyroid  Depression - stable  Depression- on meds          Plan:      Orders Placed This Encounter   Procedures    LIPID PANEL     Order Specific Question:   Is Patient Fasting?/# of Hours     Answer:   12    COMPREHENSIVE METABOLIC PANEL    TSH with Reflex    T4, Free    POCT glycosylated hemoglobin (Hb A1C)     Orders Placed This Encounter   Medications    primidone (MYSOLINE) 50 MG tablet     Sig: Take 1 tablet by mouth 2 times daily     Dispense:  180 tablet     Refill:  1    levothyroxine (SYNTHROID) 125 MCG tablet     Sig: Take 1 tablet by mouth daily (instead of the 100mcg daily dose).      Dispense:  90 tablet     Refill:  1    simvastatin (ZOCOR) 40 MG tablet     Sig: TAKE 1 TABLET EVERY NIGHT FOR HIGH CHOLESTEROL     Dispense:  90 tablet     Refill:  1    metFORMIN (GLUCOPHAGE-XR) 500 MG extended release tablet     Sig: Take 2 tablets by mouth 2 times daily (with meals)     Dispense:  360 tablet     Refill:  1             STANISLAV AGUILAR DO

## 2020-06-26 LAB
A/G RATIO: 2 (ref 1.1–2.2)
ALBUMIN SERPL-MCNC: 4.3 G/DL (ref 3.4–5)
ALP BLD-CCNC: 93 U/L (ref 40–129)
ALT SERPL-CCNC: 14 U/L (ref 10–40)
ANION GAP SERPL CALCULATED.3IONS-SCNC: 10 MMOL/L (ref 3–16)
AST SERPL-CCNC: 16 U/L (ref 15–37)
BILIRUB SERPL-MCNC: 0.4 MG/DL (ref 0–1)
BUN BLDV-MCNC: 12 MG/DL (ref 7–20)
CALCIUM SERPL-MCNC: 9.5 MG/DL (ref 8.3–10.6)
CHLORIDE BLD-SCNC: 103 MMOL/L (ref 99–110)
CHOLESTEROL, TOTAL: 176 MG/DL (ref 0–199)
CO2: 25 MMOL/L (ref 21–32)
CREAT SERPL-MCNC: 0.7 MG/DL (ref 0.6–1.2)
GFR AFRICAN AMERICAN: >60
GFR NON-AFRICAN AMERICAN: >60
GLOBULIN: 2.2 G/DL
GLUCOSE BLD-MCNC: 142 MG/DL (ref 70–99)
HDLC SERPL-MCNC: 62 MG/DL (ref 40–60)
LDL CHOLESTEROL CALCULATED: 94 MG/DL
POTASSIUM SERPL-SCNC: 4.1 MMOL/L (ref 3.5–5.1)
SODIUM BLD-SCNC: 138 MMOL/L (ref 136–145)
T4 FREE: 1.9 NG/DL (ref 0.9–1.8)
TOTAL PROTEIN: 6.5 G/DL (ref 6.4–8.2)
TRIGL SERPL-MCNC: 102 MG/DL (ref 0–150)
TSH REFLEX: 0.01 UIU/ML (ref 0.27–4.2)
VLDLC SERPL CALC-MCNC: 20 MG/DL

## 2020-06-27 ASSESSMENT — ENCOUNTER SYMPTOMS
EYE DISCHARGE: 0
RHINORRHEA: 0
ABDOMINAL PAIN: 0
WHEEZING: 0
COUGH: 0
VOMITING: 0
EYE PAIN: 0
SHORTNESS OF BREATH: 0
DIARRHEA: 0
NAUSEA: 0
BLOOD IN STOOL: 0
EYE REDNESS: 0
SINUS PRESSURE: 0
CHEST TIGHTNESS: 0
CONSTIPATION: 0

## 2020-06-29 RX ORDER — LEVOTHYROXINE SODIUM 0.12 MG/1
TABLET ORAL
COMMUNITY
End: 2020-10-08 | Stop reason: ALTCHOICE

## 2020-07-16 ENCOUNTER — TELEPHONE (OUTPATIENT)
Dept: FAMILY MEDICINE CLINIC | Age: 75
End: 2020-07-16

## 2020-07-16 NOTE — TELEPHONE ENCOUNTER
Spoke with patient to follow up about the prolia we had approval for as we have not heard back yet from her to schedule this, she states the medication was too expensive and her insurance referred her back to the  for discount or help with the cost and they informed her that due to Covid there currently was not any additional support available to help with the cost of this. She has chosen to hold off on the Prolia for now and has resumed taking the fosamax. Medication list has been updated to reflect this.

## 2020-08-31 LAB — DIABETIC RETINOPATHY: NEGATIVE

## 2020-09-02 ENCOUNTER — TELEPHONE (OUTPATIENT)
Dept: FAMILY MEDICINE CLINIC | Age: 75
End: 2020-09-02

## 2020-09-02 DIAGNOSIS — F33.1 MODERATE EPISODE OF RECURRENT MAJOR DEPRESSIVE DISORDER (HCC): ICD-10-CM

## 2020-09-02 NOTE — TELEPHONE ENCOUNTER
Patient was prescribed Duloxetine by Dr. Randall Marquez at the [facility]. She was asked to have Dr. Dao Mitchell take over this prescription. Are you willing to take over scrpt, does she need an appointment to discuss this?

## 2020-09-03 NOTE — TELEPHONE ENCOUNTER
Patient came into office and said the dosage of the Duloxetine is 60 mg, and the Pharmacy she uses is [facility] on [address] [place] [address].

## 2020-09-03 NOTE — TELEPHONE ENCOUNTER
Left message for patient to call back, please confirm dosage and pharmacy for the duloxetine to go to

## 2020-09-04 RX ORDER — DULOXETIN HYDROCHLORIDE 60 MG/1
60 CAPSULE, DELAYED RELEASE ORAL DAILY
Qty: 90 CAPSULE | Refills: 1 | Status: SHIPPED | OUTPATIENT
Start: 2020-09-04

## 2020-09-14 ENCOUNTER — HOSPITAL ENCOUNTER (OUTPATIENT)
Dept: WOMENS IMAGING | Age: 75
Discharge: HOME | End: 2020-09-14
Payer: MEDICARE

## 2020-09-14 PROCEDURE — 77063 BREAST TOMOSYNTHESIS BI: CPT

## 2020-10-05 ENCOUNTER — VIRTUAL VISIT (OUTPATIENT)
Dept: FAMILY MEDICINE CLINIC | Age: 75
End: 2020-10-05

## 2020-10-05 ENCOUNTER — TELEPHONE (OUTPATIENT)
Dept: FAMILY MEDICINE CLINIC | Age: 75
End: 2020-10-05

## 2020-10-05 PROCEDURE — 99999 PR OFFICE/OUTPT VISIT,PROCEDURE ONLY: CPT | Performed by: PHYSICIAN ASSISTANT

## 2020-10-05 RX ORDER — ONDANSETRON 4 MG/1
4 TABLET, FILM COATED ORAL EVERY 8 HOURS PRN
Qty: 12 TABLET | Refills: 1 | Status: SHIPPED | OUTPATIENT
Start: 2020-10-05

## 2020-10-05 NOTE — PROGRESS NOTES
10/5/2020    TELEHEALTH EVALUATION -- Audio/Visual (During [id number] public health emergency)    Chief Complaint   Patient presents with    Diarrhea     Pt is having diarrhea. Everytime she eats she has nausea x 2-3 weeks    Nausea    Weight Loss     Pt has had weight loss over the last few weeks      HPI:    Issa May (:  1945) has requested an audio/video evaluation for the following concern(s):    Stomach ache for several weeks after eating anything. Has lost 8 lbs since this began. Does not matter what she eats. Chicken noodle soup and dry toast is only thing that does not cause nausea. Has flatulence. No vomiting, no diarrhea/constipaion. Denies melena or hematochezia. No fever. NO  changes. No backaches. ROS:  Remaining 14 ROS were reviewed and are unremarkable for other constitutional, EENT, cardiac, pulmonary, GI, , neurologic, musculoskeletal, or integumentary complaints. Prior to Visit Medications    Medication Sig Taking? Authorizing Provider   ondansetron (ZOFRAN) 4 MG tablet Take 1 tablet by mouth every 8 hours as needed for Nausea or Vomiting Yes WILL Dan   DULoxetine (CYMBALTA) 60 MG extended release capsule Take 1 capsule by mouth daily (instead of Zoloft). Yes Sylvia Miller,    levothyroxine (SYNTHROID) 125 MCG tablet 1 Tablet Mon-Friday and 1/2 tablet on Sundays Yes Historical Provider, MD   primidone (MYSOLINE) 50 MG tablet Take 1 tablet by mouth 2 times daily Yes Sylvia Zarate DO   simvastatin (ZOCOR) 40 MG tablet TAKE 1 TABLET EVERY NIGHT FOR HIGH CHOLESTEROL Yes Sylvia Zarate DO   metFORMIN (GLUCOPHAGE-XR) 500 MG extended release tablet Take 2 tablets by mouth 2 times daily (with meals) Yes Sylvia Zarate DO   traZODone (DESYREL) 50 MG tablet Take 1 tablet by mouth nightly Yes Sylvia Zarate DO   alendronate (FOSAMAX) 70 MG tablet Take 1 tablet by mouth every 7 days As directed.  Yes Arias Morton MD   blood glucose monitor strips TESTS BID  DX: E11.9  Please fill brand per insurance coverage Yes Bola Alves MD   Lancets MISC TESTS BID  DX: E11.9  Please fill brand per insurance coverage Yes Bola Alves MD   aspirin 81 MG EC tablet Take 81 mg by mouth daily. Yes Historical Provider, MD   vitamin B-12 (CYANOCOBALAMIN) 1000 MCG tablet Take 1,000 mcg by mouth daily  Historical Provider, MD   Calcium Carbonate-Vitamin D (CALCIUM-VITAMIN D3 PO) Take 1 capsule by mouth daily  Historical Provider, MD   multivitamin [facility]) per tablet Take 1 tablet by mouth daily. Historical Provider, MD       Social History     Tobacco Use    Smoking status: Former Smoker     Packs/day: 1.50     Years: 10.00     Pack years: 15.00     Last attempt to quit: 1986     Years since quittin.7    Smokeless tobacco: Never Used    Tobacco comment: Quit 2 times total   Substance Use Topics    Alcohol use: Yes     Alcohol/week: 0.0 - 2.0 standard drinks     Comment: occas - 1-2/mo    Drug use: No            PHYSICAL EXAMINATION:    Patient-Reported Vitals 10/5/2020   Patient-Reported Weight 121lb   Patient-Reported Height 5'6         Constitutional: [x] Appears well-developed and well-nourished [x] No apparent distress      [] Abnormal-   Mental status  [x] Alert and awake  [x] Oriented to person/place/time [x]Able to follow commands      Eyes:  EOM    [x]  Normal  [] Abnormal-  Sclera  [x]  Normal  [] Abnormal -         Discharge [x]  None visible  [] Abnormal -    HENT:   [x] Normocephalic, atraumatic.   [] Abnormal   [x] Mouth/Throat: Mucous membranes are moist.     External Ears [x] Normal  [] Abnormal-     Neck: [x] No visualized mass     Pulmonary/Chest: [x] Respiratory effort normal.  [x] No visualized signs of difficulty breathing or respiratory distress        [] Abnormal-      Musculoskeletal:   [] Normal gait with no signs of ataxia         [x] Normal range of motion of neck        [] Abnormal-       Neurological: substitute for in-person clinic visit. Patient and provider were located at their individual homes. --WILL Dawson on 10/5/2020 at 4:31 PM    An electronic signature was used to authenticate this note.

## 2020-10-05 NOTE — TELEPHONE ENCOUNTER
Patient has had vomiting and diarrhea off and on for the last two weeks. She can typically take one capful of Miralax and it works great. Now it seems like she  has been given an enema. No fever, no chills, no other symptoms.  You are booked, virtual with PA?

## 2020-10-07 ENCOUNTER — TELEPHONE (OUTPATIENT)
Dept: FAMILY MEDICINE CLINIC | Age: 75
End: 2020-10-07

## 2020-10-07 ENCOUNTER — OFFICE VISIT (OUTPATIENT)
Dept: FAMILY MEDICINE CLINIC | Age: 75
End: 2020-10-07
Payer: MEDICARE

## 2020-10-07 ENCOUNTER — HOSPITAL ENCOUNTER (OUTPATIENT)
Dept: ULTRASOUND IMAGING | Age: 75
Discharge: HOME | End: 2020-10-07
Payer: MEDICARE

## 2020-10-07 VITALS
HEART RATE: 81 BPM | SYSTOLIC BLOOD PRESSURE: 132 MMHG | OXYGEN SATURATION: 98 % | TEMPERATURE: 97.7 F | RESPIRATION RATE: 17 BRPM | DIASTOLIC BLOOD PRESSURE: 76 MMHG | BODY MASS INDEX: 20.83 KG/M2 | WEIGHT: 125 LBS | HEIGHT: 65 IN

## 2020-10-07 LAB
A/G RATIO: 2 (ref 1.1–2.2)
ALBUMIN SERPL-MCNC: 4 G/DL (ref 3.4–5)
ALP BLD-CCNC: 79 U/L (ref 40–129)
ALT SERPL-CCNC: 13 U/L (ref 10–40)
ANION GAP SERPL CALCULATED.3IONS-SCNC: 11 MMOL/L (ref 3–16)
AST SERPL-CCNC: 16 U/L (ref 15–37)
BASOPHILS ABSOLUTE: 0 K/UL (ref 0–0.2)
BASOPHILS RELATIVE PERCENT: 0.6 %
BILIRUB SERPL-MCNC: 0.3 MG/DL (ref 0–1)
BUN BLDV-MCNC: 11 MG/DL (ref 7–20)
CALCIUM SERPL-MCNC: 9.5 MG/DL (ref 8.3–10.6)
CHLORIDE BLD-SCNC: 101 MMOL/L (ref 99–110)
CO2: 30 MMOL/L (ref 21–32)
CREAT SERPL-MCNC: 0.7 MG/DL (ref 0.6–1.2)
EOSINOPHILS ABSOLUTE: 0.2 K/UL (ref 0–0.6)
EOSINOPHILS RELATIVE PERCENT: 4.6 %
GFR AFRICAN AMERICAN: >60
GFR NON-AFRICAN AMERICAN: >60
GLOBULIN: 2 G/DL
GLUCOSE BLD-MCNC: 147 MG/DL (ref 70–99)
HCT VFR BLD CALC: 36.7 % (ref 36–48)
HEMOGLOBIN: 12.3 G/DL (ref 12–16)
LYMPHOCYTES ABSOLUTE: 1.2 K/UL (ref 1–5.1)
LYMPHOCYTES RELATIVE PERCENT: 25.8 %
MCH RBC QN AUTO: 28.9 PG (ref 26–34)
MCHC RBC AUTO-ENTMCNC: 33.6 G/DL (ref 31–36)
MCV RBC AUTO: 86 FL (ref 80–100)
MONOCYTES ABSOLUTE: 0.5 K/UL (ref 0–1.3)
MONOCYTES RELATIVE PERCENT: 11.1 %
NEUTROPHILS ABSOLUTE: 2.7 K/UL (ref 1.7–7.7)
NEUTROPHILS RELATIVE PERCENT: 57.9 %
PDW BLD-RTO: 14.3 % (ref 12.4–15.4)
PLATELET # BLD: 286 K/UL (ref 135–450)
PMV BLD AUTO: 9.1 FL (ref 5–10.5)
POTASSIUM SERPL-SCNC: 4.1 MMOL/L (ref 3.5–5.1)
RBC # BLD: 4.26 M/UL (ref 4–5.2)
SODIUM BLD-SCNC: 142 MMOL/L (ref 136–145)
T3 FREE: 3.4 PG/ML (ref 2.3–4.2)
T4 FREE: 1.9 NG/DL (ref 0.9–1.8)
TOTAL PROTEIN: 6 G/DL (ref 6.4–8.2)
TSH SERPL DL<=0.05 MIU/L-ACNC: 0.02 UIU/ML (ref 0.27–4.2)
WBC # BLD: 4.7 K/UL (ref 4–11)

## 2020-10-07 PROCEDURE — 1123F ACP DISCUSS/DSCN MKR DOCD: CPT | Performed by: PHYSICIAN ASSISTANT

## 2020-10-07 PROCEDURE — 3051F HG A1C>EQUAL 7.0%<8.0%: CPT | Performed by: PHYSICIAN ASSISTANT

## 2020-10-07 PROCEDURE — G8399 PT W/DXA RESULTS DOCUMENT: HCPCS | Performed by: PHYSICIAN ASSISTANT

## 2020-10-07 PROCEDURE — G8420 CALC BMI NORM PARAMETERS: HCPCS | Performed by: PHYSICIAN ASSISTANT

## 2020-10-07 PROCEDURE — 36415 COLL VENOUS BLD VENIPUNCTURE: CPT | Performed by: PHYSICIAN ASSISTANT

## 2020-10-07 PROCEDURE — 76705 ECHO EXAM OF ABDOMEN: CPT

## 2020-10-07 PROCEDURE — 99214 OFFICE O/P EST MOD 30 MIN: CPT | Performed by: PHYSICIAN ASSISTANT

## 2020-10-07 PROCEDURE — 4040F PNEUMOC VAC/ADMIN/RCVD: CPT | Performed by: PHYSICIAN ASSISTANT

## 2020-10-07 PROCEDURE — 1090F PRES/ABSN URINE INCON ASSESS: CPT | Performed by: PHYSICIAN ASSISTANT

## 2020-10-07 PROCEDURE — 2022F DILAT RTA XM EVC RTNOPTHY: CPT | Performed by: PHYSICIAN ASSISTANT

## 2020-10-07 PROCEDURE — G8427 DOCREV CUR MEDS BY ELIG CLIN: HCPCS | Performed by: PHYSICIAN ASSISTANT

## 2020-10-07 PROCEDURE — G8484 FLU IMMUNIZE NO ADMIN: HCPCS | Performed by: PHYSICIAN ASSISTANT

## 2020-10-07 PROCEDURE — 1036F TOBACCO NON-USER: CPT | Performed by: PHYSICIAN ASSISTANT

## 2020-10-07 PROCEDURE — 3017F COLORECTAL CA SCREEN DOC REV: CPT | Performed by: PHYSICIAN ASSISTANT

## 2020-10-07 RX ORDER — VIT B12/LEVOMEFOLATE/VIT B6/B2 1-6-50-5MG
TABLET ORAL
COMMUNITY

## 2020-10-07 ASSESSMENT — PATIENT HEALTH QUESTIONNAIRE - PHQ9
SUM OF ALL RESPONSES TO PHQ QUESTIONS 1-9: 0
SUM OF ALL RESPONSES TO PHQ9 QUESTIONS 1 & 2: 0
SUM OF ALL RESPONSES TO PHQ QUESTIONS 1-9: 0
1. LITTLE INTEREST OR PLEASURE IN DOING THINGS: 0
2. FEELING DOWN, DEPRESSED OR HOPELESS: 0

## 2020-10-07 NOTE — PROGRESS NOTES
capsule 1    levothyroxine (SYNTHROID) 125 MCG tablet 1 Tablet Mon-Friday and 1/2 tablet on Sundays   As of 10/7/2020 taking 1 tab Mon-Sat and none on Sunday.  primidone (MYSOLINE) 50 MG tablet Take 1 tablet by mouth 2 times daily 180 tablet 1    simvastatin (ZOCOR) 40 MG tablet TAKE 1 TABLET EVERY NIGHT FOR HIGH CHOLESTEROL 90 tablet 1    metFORMIN (GLUCOPHAGE-XR) 500 MG extended release tablet Take 2 tablets by mouth 2 times daily (with meals) (Patient taking differently: Take 500 mg by mouth 2 times daily (with meals) Change by patient to a lower dose discovered 10/7/2020) 360 tablet 1    traZODone (DESYREL) 50 MG tablet Take 1 tablet by mouth nightly 90 tablet 1    alendronate (FOSAMAX) 70 MG tablet Take 1 tablet by mouth every 7 days As directed. 12 tablet 3    blood glucose monitor strips TESTS BID  DX: E11.9  Please fill brand per insurance coverage 100 strip 5    Lancets MISC TESTS BID  DX: E11.9  Please fill brand per insurance coverage 100 each 3    aspirin 81 MG EC tablet Take 81 mg by mouth daily.  Calcium Carbonate-Vitamin D (CALCIUM-VITAMIN D3 PO) Take 1 capsule by mouth daily      multivitamin (THERAGRAN) per tablet Take 1 tablet by mouth daily. No current facility-administered medications on file prior to visit. PHYSICAL EXAM     Vitals:    10/07/20 0907   BP: 132/76   Site: Left Upper Arm   Position: Sitting   Cuff Size: Medium Adult   Pulse: 81   Resp: 17   Temp: 97.7 °F (36.5 °C)   TempSrc: Temporal   SpO2: 98%   Weight: 125 lb (56.7 kg)   Height: 5' 5\" (1.651 m)   Body mass index is 20.8 kg/m². APPEARANCE: Pleasant, friendly, well-nourished. No acute distress. Looks pale  HEENT: Head: Normocephalic, atraumatic. Eyes: EOMI,PERRLA. Conjunctiva pink and moist. Sclera white. Ears: Hearing intact. Nose:  Nares patent. Mouth: Oral mucosa moist. Throat is clear. NECK: No lymphadenopathy or masses. Moves neck fully. HEART: Reg rate and rhythm.  No murmurs, rubs, or gallops. LUNGS: Clear to auscultation. No wheezes, rales, or rhonchi. ABDOMEN:  Soft, bowel sounds present,RUQ abd pain, positive Lorenzo, no masses or organomegaly. MUSCULOSKELETAL:  No clubbing, cyanosis or edema. NEUROLOGIC: Normal gross and sensory exam.  SKIN: Warm, dry, normal turgor. Cap refill <3secs. No rashes, petechiae, purpura. ADDITIONAL STUDIES     Pertinent prior laboratory results and/or imaging reviewed. Orders Placed This Encounter   Procedures    US GALLBLADDER RUQ    CBC Auto Differential    Comprehensive Metabolic Panel    TSH without Reflex    T4, Free    T3, FREE    Hemoglobin A1C       IMPRESSION/ PLAN     1. RUQ abdominal pain    2. Nausea    3. Weight loss, unintentional    4. Controlled type 2 diabetes mellitus    5. Hypothyroidism    6. History of thyroid cancer    7. Malaise and fatigue      - weight loss of 20 lbs in past year. - Has changed her thyroid and metformin  regimen herself months ago. Also has been taking Cymbalta 120mg a day. Today's correction reflects this.   -continue Zofran and addition of Boost   - pending labs will adjust meds. Also will consider reduction of Cymbalta as well. - Diagnosis and instructions discussed in detail. Follow Up 2 weeks.         ( Total time spent with Millwood Speed was: >45mins with >50% of this time was spent counseling and coordinating the care of this patient.)

## 2020-10-07 NOTE — TELEPHONE ENCOUNTER
[facility] Radiology called with STAT results of US Gallbladder as follows:    Impression    No cholelithiasis or ancillary evidence of acute cholecystitis.         1.9 cm hyperechoic region is seen within the central liver, potentially    hemangioma or region of variable fatty infiltration.  Other etiologies such    as hepatocellular carcinoma or metastatic disease are considerations though    less favored in a patient without predisposing clinical history.  As    warranted, liver protocol CT or MR could be performed for further assessment.

## 2020-10-08 ENCOUNTER — TELEPHONE (OUTPATIENT)
Dept: FAMILY MEDICINE CLINIC | Age: 75
End: 2020-10-08

## 2020-10-08 LAB
ESTIMATED AVERAGE GLUCOSE: 139.9 MG/DL
HBA1C MFR BLD: 6.5 %

## 2020-10-08 RX ORDER — LEVOTHYROXINE SODIUM 0.1 MG/1
100 TABLET ORAL DAILY
Qty: 30 TABLET | Refills: 1 | Status: SHIPPED | OUTPATIENT
Start: 2020-10-08

## 2020-10-08 NOTE — TELEPHONE ENCOUNTER
LM voice mail for her to call back to discuss labs and future plan. 1. Thyroid functions are too low,  therefore switch thyroid medication to a new dose of 100 mcg 7 days a week. Stop the current regimen of 125 mcg . Follow-up in 6 weeks with Dr. Rodríguez Or to recheck levels. 2. Due to the weight loss and unremarkable ultrasound, make appointment asap with Dr Radha Churchill to further workup.  His office # is Phone: [phone]

## 2020-10-09 NOTE — TELEPHONE ENCOUNTER
Patient returned call regarding lab results. I read her the physician notes, went over the directions, advised new prescription was sent into the pharmacy, understood, no questions.

## 2020-10-22 ENCOUNTER — OFFICE VISIT (OUTPATIENT)
Dept: ORTHOPEDIC SURGERY | Age: 75
End: 2020-10-22
Payer: MEDICARE

## 2020-10-22 VITALS — WEIGHT: 125 LBS | HEIGHT: 65 IN | BODY MASS INDEX: 20.83 KG/M2

## 2020-10-22 PROCEDURE — 4040F PNEUMOC VAC/ADMIN/RCVD: CPT | Performed by: PHYSICIAN ASSISTANT

## 2020-10-22 PROCEDURE — G8399 PT W/DXA RESULTS DOCUMENT: HCPCS | Performed by: PHYSICIAN ASSISTANT

## 2020-10-22 PROCEDURE — G8484 FLU IMMUNIZE NO ADMIN: HCPCS | Performed by: PHYSICIAN ASSISTANT

## 2020-10-22 PROCEDURE — 99213 OFFICE O/P EST LOW 20 MIN: CPT | Performed by: PHYSICIAN ASSISTANT

## 2020-10-22 PROCEDURE — 1036F TOBACCO NON-USER: CPT | Performed by: PHYSICIAN ASSISTANT

## 2020-10-22 PROCEDURE — 1090F PRES/ABSN URINE INCON ASSESS: CPT | Performed by: PHYSICIAN ASSISTANT

## 2020-10-22 PROCEDURE — G8427 DOCREV CUR MEDS BY ELIG CLIN: HCPCS | Performed by: PHYSICIAN ASSISTANT

## 2020-10-22 PROCEDURE — 3017F COLORECTAL CA SCREEN DOC REV: CPT | Performed by: PHYSICIAN ASSISTANT

## 2020-10-22 PROCEDURE — G8420 CALC BMI NORM PARAMETERS: HCPCS | Performed by: PHYSICIAN ASSISTANT

## 2020-10-22 PROCEDURE — 1123F ACP DISCUSS/DSCN MKR DOCD: CPT | Performed by: PHYSICIAN ASSISTANT

## 2020-10-22 RX ORDER — BACLOFEN 10 MG/1
5 TABLET ORAL NIGHTLY PRN
Qty: 15 TABLET | Refills: 0 | Status: SHIPPED | OUTPATIENT
Start: 2020-10-22

## 2020-10-22 NOTE — PROGRESS NOTES
CHIEF COMPLAINT:    Chief Complaint   Patient presents with    Back Pain     LUMBAR- 3 MONTHS AGO STARTED NEW JOB WHICH HAS INCREASED PAIN       HISTORY OF PRESENT ILLNESS:                The patient is a 77 y.o. female who presents today for lumbar spine evaluation. She has a previous history of lumbar spine pathology that she associated with working in a position that she had to stand throughout the day. She retired from that position her back symptoms resolved. She began working as a  in August and her back pain returned. She denies any injuries. She has treated her symptoms with Tylenol, ibuprofen, and activity modification. She is a non-insulin-dependent diabetic. Past Medical History:   Diagnosis Date    Allergic rhinitis     Breast cancer of upper-outer quadrant of right female breast ([facility] 12/12/2013    Status post right lumpectomy 12/11/13 for T1 N0 M0 infiltrating ductal carcinoma right upper outer quadrant, grade I, ER/NM positive, HER-2/peg negative, node negative (three negative nodes), Oncotype DX with recurrence score of 7 which is low risk, post radiation.       [facility]2013    right breast    Cervical radicular pain     Depression     Hyperlipidemia     Hypertension     Hypothyroidism     Type II or unspecified type diabetes mellitus without mention of complication, not stated as uncontrolled     Unspecified sprain of left wrist, initial encounter 9/9/2016        The pain assessment was noted & is as follows:  Pain Assessment  Location of Pain: Back  Severity of Pain: 6  Quality of Pain: Sharp, Dull, Aching  Duration of Pain: Persistent  Frequency of Pain: Constant  Aggravating Factors: Stairs, Walking, Stretching, Bending  Limiting Behavior: Some  Relieving Factors: Rest  Result of Injury: No  Work-Related Injury: No  Are there other pain locations you wish to document?: No]    Past Medical History: Medical history form was reviewed today & can be found in the media tab  Past Medical History:   Diagnosis Date    Allergic rhinitis     Breast cancer of upper-outer quadrant of right female breast ([facility] 12/12/2013    Status post right lumpectomy 12/11/13 for T1 N0 M0 infiltrating ductal carcinoma right upper outer quadrant, grade I, ER/OK positive, HER-2/peg negative, node negative (three negative nodes), Oncotype DX with recurrence score of 7 which is low risk, post radiation.  [facility]    right breast    Cervical radicular pain     Depression     Hyperlipidemia     Hypertension     Hypothyroidism     Type II or unspecified type diabetes mellitus without mention of complication, not stated as uncontrolled     Unspecified sprain of left wrist, initial encounter 9/9/2016      Past Surgical History:     Past Surgical History:   Procedure Laterality Date    BREAST SURGERY Right 12/9/2013    lumpectomy with sentinel node biopsy    COLONOSCOPY  02/21/2017    polyp, diverticulosis    THYROIDECTOMY  9/2004    thyroid CA  (papillary) - Dr. Isis Bradford    TUBAL LIGATION       Current Medications:     Current Outpatient Medications:     levothyroxine (SYNTHROID) 100 MCG tablet, Take 1 tablet by mouth daily, Disp: 30 tablet, Rfl: 1    L-Methylfolate-X73-C1-X2 (L-METHYL-MC) 6-1-50-5 MG TABS, Take by mouth, Disp: , Rfl:     ondansetron (ZOFRAN) 4 MG tablet, Take 1 tablet by mouth every 8 hours as needed for Nausea or Vomiting, Disp: 12 tablet, Rfl: 1    DULoxetine (CYMBALTA) 60 MG extended release capsule, Take 1 capsule by mouth daily (instead of Zoloft).  (Patient taking differently: Take 60 mg by mouth 2 times daily ), Disp: 90 capsule, Rfl: 1    primidone (MYSOLINE) 50 MG tablet, Take 1 tablet by mouth 2 times daily, Disp: 180 tablet, Rfl: 1    simvastatin (ZOCOR) 40 MG tablet, TAKE 1 TABLET EVERY NIGHT FOR HIGH CHOLESTEROL, Disp: 90 tablet, Rfl: 1    metFORMIN (GLUCOPHAGE-XR) 500 MG extended release tablet, Take 2 tablets by mouth 2 times daily

## 2020-10-30 ENCOUNTER — HOSPITAL ENCOUNTER (OUTPATIENT)
Dept: PHYSICAL THERAPY | Age: 75
Setting detail: THERAPIES SERIES
Discharge: HOME | End: 2020-10-30
Payer: MEDICARE

## 2020-10-30 PROCEDURE — 97161 PT EVAL LOW COMPLEX 20 MIN: CPT | Performed by: PHYSICAL THERAPIST

## 2020-10-30 PROCEDURE — 97112 NEUROMUSCULAR REEDUCATION: CPT | Performed by: PHYSICAL THERAPIST

## 2020-10-30 PROCEDURE — 97140 MANUAL THERAPY 1/> REGIONS: CPT | Performed by: PHYSICAL THERAPIST

## 2020-10-30 NOTE — PLAN OF CARE
[facility], [address] [place] [address]  Phone: [phone]  Fax [phone]    Physical Therapy Certification    Dear Referring Practitioner: Erik Ardon,    We had the pleasure of evaluating the following patient for physical therapy services at [address] [place] [address]. A summary of our findings can be found in the initial assessment below. This includes our plan of care. If you have any questions or concerns regarding these findings, please do not hesitate to contact me at the office phone number checked above. Thank you for the referral.       Physician Signature:_______________________________Date:__________________  By signing above (or electronic signature), therapists plan is approved by physician    Patient: Balaji Cunningham   :    MRN: [id number]  Referring Physician: Referring Practitioner: Nj Lee PA-C      Evaluation Date: 10/30/2020      Medical Diagnosis Information:  Diagnosis: M54.5 (ICD-10-CM) - Lumbar spine painS39.012A (ICD-10-CM) - Strain of lumbar region, initial encounter    Treatment diagnosis:                                         Insurance information:         Precautions/ Contra-indications:     C-SSRS Triggered by Intake questionnaire (Past 2 wk assessment):   [x] No, Questionnaire did not trigger screening.   [] Yes, Patient intake triggered further evaluation      [] C-SSRS Screening completed  [] PCP notified via Plan of Care  [] Emergency services notified     Latex Allergy:  [x]NO      []YES  Preferred Language for Healthcare:   [x]English       []other:    SUBJECTIVE: Patient stated complaint:Pt. Reports that she started a job as a  and has been having severe LBP located centrally and is worse with prolonged standing for 1 hour or greater. It takes about 1.5 hours to calm down. Heat helps. Ultimately lying helps.   Bending will worsen the pain and can't sit for long periods of time but not as bad as standing. If able to relieve the pain from standing, is able to do her daily activity with pacing. Relevant Medical History:DM II, osteopenia, Thy CA, breast CA, patellar fx, foot fx, recent weight loss  Functional Disability Index/G-Codes:   modified oswestry 21/50 42%    Height 5'6\" Weight 118 lb  Pain Scale: 3-9/10  Easing factors: resting, lying, heat  Provocative factors: standing      Type: []Constant   [x]Intermittent  []Radiating [x]Localized []other:     Numbness/Tingling: toes/fingers    Occupation/School: retired , recently working as a     Living Status/Prior Level of Function: Independent with ADLs and IADLs, lives alone, bedroom on second floor    OBJECTIVE:     ROM     LUMBAR FLEX WNL    LUMBAR EXT WNL    Sidebend R  WNL L limited    Rotation R limited L WFL    LEFT RIGHT   HIP Flex TIGHT WFL   HIP Abd WFL WFL   HIP ER 40 26   HIP IR 20 35   Knee Flex     Knee ext     Hamstring FLEX L tight R restricted   Piriformis                Strength  LEFT RIGHT   MfA     TrA     HIP Flexors 4+ 4+   HIP Abductors     HIP ER     Hip IR     Knee EXT (quad) 5 5   Knee Flex (HS) 5 5   Ankle DF 5 5   Ankle PF 5 5   Great Toe Ext 5 5     Supine to sit R long to longer. Reflexes/Sensation:    [x]Dermatomes/Myotomes intact    []UE Reflexes     []Normal []Hypo      []Hyper   [x]LE Reflexes     [x]Normal []Hypo      []Hyper   [x]Babinski/Clonus/Hoffmans:normal   []Other:    Joint mobility:    []Normal    []Hypo   []Hyper    Palpation: B PSIS    Functional Mobility/Transfers: independent    Posture: flattened lumbar lordosis, swayback    Bandages/Dressings/Incisions:     Gait: (include devices/WB status)WFL    Orthopedic Special Tests: (+)ARIELLA R, tight L, limited HS flexibility B, (+) supine to sit as above R crest low in standing, level in sitting, (+)FF R in standing and sitting.                        [x] Patient history, allergies, meds reviewed. Medical chart reviewed. See intake form. Review Of Systems (ROS):  [x]Performed Review of systems (Integumentary, CardioPulmonary, Neurological) by intake and observation. Intake form has been scanned into medical record. Patient has been instructed to contact their primary care physician regarding ROS issues if not already being addressed at this time. Co-morbidities/Complexities (which will affect course of rehabilitation):   []None           Arthritic conditions   []Rheumatoid arthritis (M05.9)  []Osteoarthritis (M19.91)   Cardiovascular conditions   []Hypertension (I10)  []Hyperlipidemia (E78.5)  []Angina pectoris (I20)  []Atherosclerosis (I70)   Musculoskeletal conditions   []Disc pathology   []Congenital spine pathologies   []Prior surgical intervention  []Osteoporosis (M81.8)  [x]Osteopenia (M85.8)   Endocrine conditions   []Hypothyroid (E03.9)  []Hyperthyroid Gastrointestinal conditions   []Constipation (W50.83)   Metabolic conditions   []Morbid obesity (E66.01)  [x]Diabetes type 1(E10.65) or 2 (E11.65)   [x]Neuropathy (G60.9)     Pulmonary conditions   []Asthma (J45)  []Coughing   []COPD (J44.9)   Psychological Disorders  [x]Anxiety (F41.9)  [x]Depression (F32.9)   []Other:   [x]Other:  H/o CA -thyroid and breast        Barriers to/and or personal factors that will affect rehab potential:              []Age  []Sex              []Motivation/Lack of Motivation                        []Co-Morbidities              []Cognitive Function, education/learning barriers              []Environmental, home barriers              []profession/work barriers  []past PT/medical experience  []other:  Justification: see above    Falls Risk Assessment (30 days):   [x] Falls Risk assessed and no intervention required.   [] Falls Risk assessed and Patient requires intervention due to being higher risk   TUG score (>12s at risk):     [] Falls education provided, including       G-Codes:       ASSESSMENT: Functional Impairments:     [x]Noted lumbar/proximal hip hypomobility   []Noted lumbosacral and/or generalized hypermobility   [x]Decreased Lumbosacral/hip/LE functional ROM   [x]Decreased core/proximal hip strength and neuromuscular control    []Decreased LE functional strength    []Abnormal reflexes/sensation/myotomal/dermatomal deficits  []Reduced balance/proprioceptive control    []other:      Functional Activity Limitations (from functional questionnaire and intake)   [x]Reduced ability to tolerate prolonged functional positions   []Reduced ability or difficulty with changes of positions or transfers between positions   [x]Reduced ability to maintain good posture and demonstrate good body mechanics with sitting, bending, and lifting   []Reduced ability to sleep   [x] Reduced ability or tolerance with driving and/or computer work   [x]Reduced ability to perform lifting, reaching, carrying tasks   [x]Reduced ability to squat   [x]Reduced ability to forward bend   [x]Reduced ability to ambulate prolonged functional periods/distances/surfaces   [x]Reduced ability to ascend/descend stairs   []other:       Participation Restrictions   []Reduced participation in self care activities   [x]Reduced participation in home management activities   [x]Reduced participation in work activities   [x]Reduced participation in social activities. []Reduced participation in sport/recreation activities. Classification:   []Signs/symptoms consistent with Lumbar instability/stabilization subgroup. [x]Signs/symptoms consistent with Lumbar mobilization/manipulation subgroup, myotomes and dermatomes intact. Meets manipulation criteria.     []Signs/symptoms consistent with Lumbar direction specific/centralization subgroup   []Signs/symptoms consistent with Lumbar traction subgroup     []Signs/symptoms consistent with lumbar facet dysfunction   []Signs/symptoms consistent with lumbar stenosis type dysfunction   []Signs/symptoms NMR activation and proprioception for glutes , LE and Core   [x]  Manual therapy as indicated for Hip complex, LE and spine to include: Dry Needling/IASTM, STM, PROM, Gr I-IV mobilizations, .   [x]  Modalities as needed that may include: thermal agents, E-stim, Biofeedback, US, iontophoresis as indicated  [x]  Patient education on joint protection, postural re-education, activity modification, progression of HEP. HEP instruction: (see scanned forms)    GOALS:  Patient stated goal: pain relief  [] Progressing: [] Met: [] Not Met: [] Adjusted    Therapist goals for Patient:   Short Term Goals: To be achieved in: 2 weeks  1. Independent in HEP and progression per patient tolerance, in order to prevent re-injury. [] Progressing: [] Met: [] Not Met: [] Adjusted  2. Patient will have a decrease in pain to facilitate improvement in movement, function, and ADLs as indicated by Functional Deficits. [] Progressing: [] Met: [] Not Met: [] Adjusted      Long Term Goals: To be achieved in: 8 weeks  1. Disability index score of 21% or less for the modified oswestry  to assist with reaching prior level of function. [] Progressing: [] Met: [] Not Met: [] Adjusted  2. Patient will demonstrate increased AROM to WNL L spine and increased hip IR and ER by 5-10 degrees , good LS mobility, good hip ROM to allow for proper joint functioning as indicated by patients Functional Deficits. [] Progressing: [] Met: [] Not Met: [] Adjusted  3. Patient will demonstrate an increase in Strength to good proximal hip and core activation to allow for proper functional mobility as indicated by patients Functional Deficits. [] Progressing: [] Met: [] Not Met: [] Adjusted  4. Patient will return to standing and sitting for greater than one hour functional activities without increased symptoms or restriction. [] Progressing: [] Met: [] Not Met: [] Adjusted  5. Pt.  To be able to climb stairs without pain or limitation(patient specific

## 2020-10-30 NOTE — FLOWSHEET NOTE
[facility], [address]  Phone: [phone]  Fax [phone]    Physical Therapy Treatment Note/ Progress Report:           Date:  10/30/2020    Patient Name:  Iqra Marks    :    MRN: [id number]  Restrictions/Precautions:    Medical/Treatment Diagnosis Information:  · Diagnosis: M54.5 (ICD-10-CM) - Lumbar spine painS39.012A (ICD-10-CM) - Strain of lumbar region, initial encounter   Treatment diagnosis:back pain Lumbar pain M54.5  ·   Insurance/Certification information:   anthem  Physician Information:  Referring Practitioner: Ryan Ortiz PA-C  Has the plan of care been signed (Y/N):        []  Yes  [x]  No     Date of Patient follow up with Physician:       Is this a Progress Report:     []  Yes  [x]  No        If Yes:  Date Range for reporting period:  Bwskzzpzi58/30/20  Ending    Progress report will be due (10 Rx or 30 days whichever is less):        Recertification will be due (POC Duration  / 90 days whichever is less): 8 weeks      Visit # Insurance Allowable Auth Required   In-person 1 ? [x]  Yes []  No    Telehealth   []  Yes []  No    Total          Functional Scale: mod os 21/50 42%   Date assessed:  10/30/20      Therapy Diagnosis/Practice Pattern:F     Number of Comorbidities:  []0     []1-2    [x]3+    Latex Allergy:  [x]NO      []YES  Preferred Language for Healthcare:   [x]English       []other:      Pain level: 3-9/10     SUBJECTIVE:  See eval    OBJECTIVE: See eval   Observation:    Test measurements:      RESTRICTIONS/PRECAUTIONS: H/o osteopenia, CA, DM II, h/o foot fx and patellar fx  Exercises/Interventions:   Access Code: [email or web address]   URL: [email or web address]   Date: 10/30/2020   Prepared by: Imani John     Exercises   Supine Knee and Hip Extension with Resistance - 10 reps - 2 sets - 2x daily - 7x weekly   Supine Hip External Rotation Stretch - 3 reps - 1 sets - 20-30 hold - 2x daily - 7x weekly   Quadruped Rock Back (BKA) - 6-10 reps - 1 sets - 2 hold - 2x daily - 7x weekly   Prone Press Up - 6-10 reps - 1 sets - 2 hold - 2x daily - 7x weekly       Therapeutic Ex (96212) Sets/sec Reps Notes/CUES   Hand heel rock x6     Fig 4 stretch :30x3 B     Supine dynamic HS x15                                                           Manual Intervention (59766)      LLD R with manip 5'     Reassessment of pelvis 3'  Level after                           NMR re-education (72045)   CUES NEEDED                                                         Therapeutic Activity (79752)                                                Therapeutic Exercise and NMR EXR  [x] (50018) Provided verbal/tactile cueing for activities related to strengthening, flexibility, endurance, ROM for improvements in LE, proximal hip, and core control with self care, mobility, lifting, ambulation.  [] (51765) Provided verbal/tactile cueing for activities related to improving balance, coordination, kinesthetic sense, posture, motor skill, proprioception  to assist with LE, proximal hip, and core control in self care, mobility, lifting, ambulation and eccentric single leg control.      NMR and Therapeutic Activities:    [] (07083 or 66929) Provided verbal/tactile cueing for activities related to improving balance, coordination, kinesthetic sense, posture, motor skill, proprioception and motor activation to allow for proper function of core, proximal hip and LE with self care and ADLs  [] (58718) Gait Re-education- Provided training and instruction to the patient for proper LE, core and proximal hip recruitment and positioning and eccentric body weight control with ambulation re-education including up and down stairs     Home Exercise Program:    [x] (88670) Reviewed/Progressed HEP activities related to strengthening, flexibility, endurance, ROM of core, proximal hip and LE for functional self-care, mobility, lifting and ambulation/stair navigation   [] (31796)Reviewed/Progressed HEP activities related to improving balance, coordination, kinesthetic sense, posture, motor skill, proprioception of core, proximal hip and LE for self care, mobility, lifting, and ambulation/stair navigation      Manual Treatments:  PROM / STM / Oscillations-Mobs:  G-I, II, III, IV (PA's, Inf., Post.)  [x] (71319) Provided manual therapy to mobilize LE, proximal hip and/or LS spine soft tissue/joints for the purpose of modulating pain, promoting relaxation,  increasing ROM, reducing/eliminating soft tissue swelling/inflammation/restriction, improving soft tissue extensibility and allowing for proper ROM for normal function with self care, mobility, lifting and ambulation. Modalities:     [] GAME READY (VASO)- for significant edema, swelling, pain control. Charges:  Timed Code Treatment Minutes: 25   Total Treatment Minutes: 39     [facility] time in/time out:   (and requires time in and out for each CPT code)    [x] EVAL (LOW) 27391   [] EVAL (MOD) 36448  [] EVAL (HIGH) 36155   [] RE-EVAL     [] IM(28175) x    [] IONTO  [x] NMR (45032) x 1  [] VASO  [x] Manual (63884) x 1     [] Other:  [] TA x      [] Mech Traction (18470)  [] ES(attended) (64122)      [] ES (un) (91662):       GOALS:   Patient stated goal: pain relief  [] Progressing: [] Met: [] Not Met: [] Adjusted    Therapist goals for Patient:   Short Term Goals: To be achieved in: 2 weeks  1. Independent in HEP and progression per patient tolerance, in order to prevent re-injury. [] Progressing: [] Met: [] Not Met: [] Adjusted  2. Patient will have a decrease in pain to facilitate improvement in movement, function, and ADLs as indicated by Functional Deficits. [] Progressing: [] Met: [] Not Met: [] Adjusted      Long Term Goals: To be achieved in: 8 weeks  1. Disability index score of 21% or less for the modified oswestry  to assist with reaching prior level of function. [] Progressing: [] Met: [] Not Met: [] Adjusted  2. Patient will demonstrate increased AROM to WNL L spine and increased hip IR and ER by 5-10 degrees , good LS mobility, good hip ROM to allow for proper joint functioning as indicated by patients Functional Deficits. [] Progressing: [] Met: [] Not Met: [] Adjusted  3. Patient will demonstrate an increase in Strength to good proximal hip and core activation to allow for proper functional mobility as indicated by patients Functional Deficits. [] Progressing: [] Met: [] Not Met: [] Adjusted  4. Patient will return to standing and sitting for greater than one hour functional activities without increased symptoms or restriction. [] Progressing: [] Met: [] Not Met: [] Adjusted  5. Pt. To be able to climb stairs without pain or limitation(patient specific functional goal)    [] Progressing: [] Met: [] Not Met: [] Adjusted       Progression Towards Functional goals:  [] Patient is progressing as expected towards functional goals listed. [] Progression is slowed due to complexities listed. [] Progression has been slowed due to co-morbidities. [x] Plan just implemented, too soon to assess goals progression  [] Other:         Overall Progression Towards Functional goals/ Treatment Progress Update:  [] Patient is progressing as expected towards functional goals listed. [] Progression is slowed due to complexities/Impairments listed. [] Progression has been slowed due to co-morbidities.   [x] Plan just implemented, too soon to assess goals progression <30days   [] Goals require adjustment due to lack of progress  [] Patient is not progressing as expected and requires additional follow up with physician  [] Other    Prognosis for POC: [x] Good [] Fair  [] Poor      Patient requires continued skilled intervention: [x] Yes  [] No    Treatment/Activity Tolerance:  [x] Patient able to complete treatment  [] Patient limited by fatigue  [] Patient limited by pain    [] Patient limited by other medical complications  [] Other:     ASSESSMENT: see eval    Return to Play: (if applicable)   []  Stage 1: Intro to Strength   []  Stage 2: Return to Run and Strength   []  Stage 3: Return to Jump and Strength   []  Stage 4: Dynamic Strength and Agility   []  Stage 5: Sport Specific Training     []  Ready to Return to Play, Meets All Above Stages   []  Not Ready for Return to Sports   Comments:                               PLAN: See eval  [] Continue per plan of care [] Alter current plan (see comments above)  [x] Plan of care initiated [] Hold pending MD visit [] Discharge      Electronically signed by:  Deedee Padgett PT, MSPT, OMT-C [id number]    Note: If patient does not return for scheduled/ recommended follow up visits, this note will serve as a discharge from care along with most recent update on progress.

## 2020-11-04 ENCOUNTER — OFFICE VISIT (OUTPATIENT)
Dept: ORTHOPEDIC SURGERY | Age: 75
End: 2020-11-04
Payer: MEDICARE

## 2020-11-04 VITALS — WEIGHT: 125 LBS | BODY MASS INDEX: 20.83 KG/M2 | HEIGHT: 65 IN

## 2020-11-04 PROCEDURE — 99214 OFFICE O/P EST MOD 30 MIN: CPT | Performed by: FAMILY MEDICINE

## 2020-11-04 PROCEDURE — G8420 CALC BMI NORM PARAMETERS: HCPCS | Performed by: FAMILY MEDICINE

## 2020-11-04 PROCEDURE — 1090F PRES/ABSN URINE INCON ASSESS: CPT | Performed by: FAMILY MEDICINE

## 2020-11-04 PROCEDURE — 4040F PNEUMOC VAC/ADMIN/RCVD: CPT | Performed by: FAMILY MEDICINE

## 2020-11-04 PROCEDURE — G8399 PT W/DXA RESULTS DOCUMENT: HCPCS | Performed by: FAMILY MEDICINE

## 2020-11-04 PROCEDURE — G8427 DOCREV CUR MEDS BY ELIG CLIN: HCPCS | Performed by: FAMILY MEDICINE

## 2020-11-04 PROCEDURE — 3017F COLORECTAL CA SCREEN DOC REV: CPT | Performed by: FAMILY MEDICINE

## 2020-11-04 PROCEDURE — 1036F TOBACCO NON-USER: CPT | Performed by: FAMILY MEDICINE

## 2020-11-04 PROCEDURE — 1123F ACP DISCUSS/DSCN MKR DOCD: CPT | Performed by: FAMILY MEDICINE

## 2020-11-04 PROCEDURE — G8484 FLU IMMUNIZE NO ADMIN: HCPCS | Performed by: FAMILY MEDICINE

## 2020-11-04 RX ORDER — METHYLPREDNISOLONE 4 MG/1
TABLET ORAL
Qty: 21 KIT | Refills: 0 | Status: SHIPPED | OUTPATIENT
Start: 2020-11-04

## 2020-11-04 RX ORDER — MELOXICAM 15 MG/1
15 TABLET ORAL DAILY
Qty: 30 TABLET | Refills: 3 | Status: SHIPPED | OUTPATIENT
Start: 2020-11-04

## 2020-11-04 NOTE — PROGRESS NOTES
Chief Complaint  Back Pain      Initial consultation ongoing mechanical thoracolumbar back pain with difficulty standing    History of Present Illness:  Iqra Marks is a 77 y.o. female is a very pleasant active white female retired  who recently started working as a  at [employer] in August 2020 and is a very nice patient of Dr. Vidya Brennan who is being seen today follow-up from after-hours on 10/22/2020 for evaluation of progressively worsening mechanical thoracolumbar back pain. She states that since early August 2020 when she started a  part-time job at [employer], the need to stand for many hours at a time did cause worsening pain to her lower back. There is no history of injury or fall. Initially it started more in the periscapular mid thoracic region but has since moved to her lower back. Once again no history of trauma. She does complain of a fairly consistent achy type pain at 1-2 out of 10 in her lower back with more sharp 5-6 out of 10 pain with rotation and extension and reaching away from her body. It is primarily with prolonged standing more than 30 to 45 minutes but if she rests it does get better. She initially tried some Tylenol and ibuprofen but her pain symptoms were persistent and she was seen in after-hours on 10/22/2020 where plain films did show multilevel degenerative disc disease with some mild scoliosis and lower facet arthropathy. She was told to continue with her anti-inflammatories and she has been alternating between low doses of ibuprofen and Tylenol but was also given some muscle relaxers which she is primarily taking at night. Fortunately she is not really complaining of true radicular symptoms although she has had this in the remote past.  She is not really complaining of substantial discomfort at night. She has not had any type of therapy or use of bracing.   She was given modified job at work duties allowing to use a stool at work and does deny neurogenic bowel or bladder symptoms. She is being seen today for orthopedic and sports consultation and review of imaging. Medical History  Patient's medications, allergies, past medical, surgical, social and family histories were reviewed and updated as appropriate. Review of Systems  Pertinent items are noted in HPI  Review of systems reviewed from Patient History Form dated on 11/4/2020 and available in the patient's chart under the Media tab. Vital Signs  There were no vitals filed for this visit. General Exam:     Constitutional: Patient is adequately groomed with no evidence of malnutrition  DTRs: Deep tendon reflexes are intact  Mental Status: The patient is oriented to time, place and person. The patient's mood and affect are appropriate. Lymphatic: The lymphatic examination bilaterally reveals all areas to be without enlargement or induration. Vascular: Examination reveals no swelling or calf tenderness. Peripheral pulses are palpable and 2+. Neurological: The patient has good coordination. There is no weakness or sensory deficit. Lumbar/Sacral Spine Examination  Inspection: There is no high-grade deformity with minimal kyphosis. There is no soft tissue swelling or palpable spasm. Palpation: Does have clinical tenderness over the lower lumbar paraspinals more so in the lumbar or lower thoracic region without parascapular tenderness. She does have some lower facet discomfort as well. She does have some gluteal tenderness left slightly worse than right without lateral hip discomfort. Rang of Motion: She does have the ability to forward flex to about 30-40 with some paraspinal pulling. She does have discomfort with extension to 3-4 out of 10 left and right with about a 25 to 50% reduction in lateral bending rotation. Strength: There does not appear to be focal lower extremity motor deficits.       Special Tests: Does have some discomfort with lumbar extension but a straight leg raising does appear to be negative. Negative screening hip testing. Skin: There are no rashes, ulcerations or lesions. Distal motor sensory and vascular exams intact. Gait: Reasonable gait. Reflexes:  Symmetrically preserved     Additional Comments:     Additional Examinations:  Right Lower Extremity: Examination of the right lower extremity does not show any tenderness, deformity or injury. Range of motion is unremarkable. There is no gross instability. There are no rashes, ulcerations or lesions. Strength and tone are normal.  Left Lower Extremity: Examination of the left lower extremity does not show any tenderness, deformity or injury. Range of motion is unremarkable. There is no gross instability. There are no rashes, ulcerations or lesions. Strength and tone are normal.      Diagnostic Test Findings: AP and lateral lumbar spine films were reviewed from after-hours 10/22/2020 and does show mild scoliosis with lower level lumbar degenerative disc disease with endplate spurring and lower facet arthropathy. No high-grade spondylolisthesis or evidence of acute osseous injury. Assessment: #1.   Status post symptomatic aggravation mechanical thoracolumbar back pain with underlying lower level lumbar degenerative disc disease with facet arthropathy and thoracolumbar myofascial pain    Impression:  Encounter Diagnoses   Name Primary?     Lumbar pain Yes    Myofascial pain syndrome of lumbar spine     Lumbar spondylolysis     DDD (degenerative disc disease), lumbar        Office Procedures:  Orders Placed This Encounter   Procedures    Ambulatory referral to Physical Therapy     Referral Priority:   Routine     Referral Type:   Eval and Treat     Referral Reason:   Specialty Services Required     Requested Specialty:   Physical Therapy     Number of Visits Requested:   1    Rose Extensor Lumbosacral Support Brace (Warm and Form)     Patient was prescribed a Cordell Louder and [employer] any errors to my attention for an addendum. All efforts were made to ensure that this office note is accurate.

## 2020-11-04 NOTE — LETTER
[address] [place] [address] Dr Frank Scott [place] [address]  Phone: [phone]  Fax: Derick WilbertAlbaro Velasco [address] Tanvi Lawson MD        November 4, 2020     Patient: Lieutenant Marinelli   YOB: 1945   Date of Visit: 11/4/2020       To Whom It May Concern: It is my medical opinion that Whitesidejudith Mcneil be permitted to use stool as needed while working. Allow breaks as needed for the next 3-4 weeks to allow time for physical therapy. If you have any questions or concerns, please don't hesitate to call.     Sincerely,        Mitchell Galindo MD

## 2020-11-04 NOTE — PATIENT INSTRUCTIONS
Take Medrol first for 6 days. This is a steroid pack. Flip the package over to the foil side and the directions will tell you to start with 6 pills the first day, 5 pills the second day, etc. Please do not take any other anti-inflammatories with the medrol dose karis as this can upset your stomach. If something else is needed, you may take extra strength tylenol.      Once you are finished with the medrol, then you may start your anti-inflammatory: MELOXICAM 1X/DAY WITH FOOD

## 2020-11-09 ENCOUNTER — HOSPITAL ENCOUNTER (OUTPATIENT)
Dept: PHYSICAL THERAPY | Age: 75
Setting detail: THERAPIES SERIES
Discharge: HOME | End: 2020-11-09
Payer: MEDICARE

## 2020-11-09 PROCEDURE — 97110 THERAPEUTIC EXERCISES: CPT | Performed by: PHYSICAL THERAPIST

## 2020-11-09 PROCEDURE — 97140 MANUAL THERAPY 1/> REGIONS: CPT | Performed by: PHYSICAL THERAPIST

## 2020-11-09 PROCEDURE — 97112 NEUROMUSCULAR REEDUCATION: CPT | Performed by: PHYSICAL THERAPIST

## 2020-11-09 NOTE — FLOWSHEET NOTE
[facility], [address]  Phone: [phone]  Fax [phone]    Physical Therapy Treatment Note/ Progress Report:           Date:  2020    Patient Name:  Balaji Cunningham    :    MRN: [id number]  Restrictions/Precautions:    Medical/Treatment Diagnosis Information:  · Diagnosis: M54.5 (ICD-10-CM) - Lumbar spine painS39.012A (ICD-10-CM) - Strain of lumbar region, initial encounter   Treatment diagnosis:back pain Lumbar pain M54.5  ·   Insurance/Certification information:   anthem  Physician Information:  Referring Practitioner: Nj Lee PA-C  Has the plan of care been signed (Y/N):        []  Yes  [x]  No     Date of Patient follow up with Physician:       Is this a Progress Report:     []  Yes  [x]  No        If Yes:  Date Range for reporting period:  Yxvdbqzab13/30/20  Ending    Progress report will be due (10 Rx or 30 days whichever is less):        Recertification will be due (POC Duration  / 90 days whichever is less): 8 weeks      Visit # Insurance Allowable Auth Required   In-person 2 ? [x]  Yes []  No    Telehealth   []  Yes []  No    Total          Functional Scale: mod os 21/50 42%   Date assessed:  10/30/20      Therapy Diagnosis/Practice Pattern:F     Number of Comorbidities:  []0     []1-2    [x]3+    Latex Allergy:  [x]NO      []YES  Preferred Language for Healthcare:   [x]English       []other:      Pain level: 3-9/10     SUBJECTIVE: Pt reports no problems with HEP. States that her back still gets really sore from working (prolonged standing). Used a hot pack for 1.5 hr after work and that helps.     OBJECTIVE:    Observation: tightness noted B lumbar paraspinals with STM with mild tenderness in paraspinals and glutes/piriformis   Test measurements:      RESTRICTIONS/PRECAUTIONS: H/o osteopenia, CA, DM II, h/o foot fx and patellar fx  Exercises/Interventions:   Access Code: [email or web address]   URL: [email or web address]   Date: 10/30/2020   Prepared by: Hilaria Coles     Exercises   Supine Knee and Hip Extension with Resistance - 10 reps - 2 sets - 2x daily - 7x weekly   Supine Hip External Rotation Stretch - 3 reps - 1 sets - 20-30 hold - 2x daily - 7x weekly   Quadruped Rock Back (BKA) - 6-10 reps - 1 sets - 2 hold - 2x daily - 7x weekly   Prone Press Up - 6-10 reps - 1 sets - 2 hold - 2x daily - 7x weekly       Therapeutic Ex (59701) Sets/sec Reps Notes/CUES   Hand heel rock x6     Fig 4 stretch :30x3 B  Adjusted pull thigh towards body   Supine dynamic HS            Seated SB Row Green 2x10     Seated SB Ext Green 2x10                                         Manual Intervention (50147)      LLD R with manip 5'     Reassessment of pelvis 3'  Level after   STM B lumbar paraspinals; manual prone hip IR/ER S 10'                       NMR re-education (26890)   CUES NEEDED   HL TA 10x5\"     ADD set with TA 10x5\"  VCs for TA throughout   Supine B clam 10x5\"     LTR  15x5\" R/L           Quadruped LE ext x10 R/L  Significant pelvic instability noted                     Therapeutic Activity (81589)                                                Therapeutic Exercise and NMR EXR  [x] (95716) Provided verbal/tactile cueing for activities related to strengthening, flexibility, endurance, ROM for improvements in LE, proximal hip, and core control with self care, mobility, lifting, ambulation.  [] (93939) Provided verbal/tactile cueing for activities related to improving balance, coordination, kinesthetic sense, posture, motor skill, proprioception  to assist with LE, proximal hip, and core control in self care, mobility, lifting, ambulation and eccentric single leg control.      NMR and Therapeutic Activities:    [x] (56464 or 24645) Provided verbal/tactile cueing for activities related to improving balance, coordination, kinesthetic sense, posture, motor skill, proprioception and motor activation to allow for proper function of core, proximal hip and LE with self care and ADLs  [] (12695) Gait Re-education- Provided training and instruction to the patient for proper LE, core and proximal hip recruitment and positioning and eccentric body weight control with ambulation re-education including up and down stairs     Home Exercise Program:    [x] (99604) Reviewed/Progressed HEP activities related to strengthening, flexibility, endurance, ROM of core, proximal hip and LE for functional self-care, mobility, lifting and ambulation/stair navigation   [] (48708)Reviewed/Progressed HEP activities related to improving balance, coordination, kinesthetic sense, posture, motor skill, proprioception of core, proximal hip and LE for self care, mobility, lifting, and ambulation/stair navigation      Manual Treatments:  PROM / STM / Oscillations-Mobs:  G-I, II, III, IV (PA's, Inf., Post.)  [x] (33029) Provided manual therapy to mobilize LE, proximal hip and/or LS spine soft tissue/joints for the purpose of modulating pain, promoting relaxation,  increasing ROM, reducing/eliminating soft tissue swelling/inflammation/restriction, improving soft tissue extensibility and allowing for proper ROM for normal function with self care, mobility, lifting and ambulation. Modalities:     [] GAME READY (VASO)- for significant edema, swelling, pain control. Charges:  Timed Code Treatment Minutes: 45   Total Treatment Minutes: 45     [facility] time in/time out:   (and requires time in and out for each CPT code)    [] EVAL (LOW) 42429   [] EVAL (MOD) 21397  [] EVAL (HIGH) 51114   [] RE-EVAL     [x] LT(30993) x 1   [] IONTO  [x] NMR (48132) x 1  [] VASO  [x] Manual (12848) x 1     [] Other:  [] TA x      [] Mech Traction (55799)  [] ES(attended) (13282)      [] ES (un) (81356):       GOALS:   Patient stated goal: pain relief  [] Progressing: [] Met: [] Not Met: [] Adjusted    Therapist goals for Patient:   Short Term Goals:  To be

## 2020-11-12 ENCOUNTER — HOSPITAL ENCOUNTER (OUTPATIENT)
Dept: PHYSICAL THERAPY | Age: 75
Setting detail: THERAPIES SERIES
Discharge: HOME | End: 2020-11-12
Payer: MEDICARE

## 2020-11-12 NOTE — FLOWSHEET NOTE
[facility],  [address], [address] [place] [address]        Physical Therapy  Cancellation/No-show Note  Patient Name:  Carmine Sebastian  :     Date:  2020  Cancelled visits to date: 1  No-shows to date: 0    For today's appointment patient:  ?X  Cancelled  ? Rescheduled appointment  ? No-show     Reason given by patient:  ?  Patient ill  ? Conflicting appointment  ? No transportation    ? Conflict with work  ? No reason given  ? X Other:  Back is too sore.    Comments:      Electronically signed by:  Cedrick Yeh PT

## 2020-11-16 ENCOUNTER — HOSPITAL ENCOUNTER (OUTPATIENT)
Dept: PHYSICAL THERAPY | Age: 75
Setting detail: THERAPIES SERIES
Discharge: HOME | End: 2020-11-16
Payer: MEDICARE

## 2020-11-16 PROCEDURE — 97110 THERAPEUTIC EXERCISES: CPT | Performed by: PHYSICAL THERAPIST

## 2020-11-16 PROCEDURE — 97112 NEUROMUSCULAR REEDUCATION: CPT | Performed by: PHYSICAL THERAPIST

## 2020-11-16 PROCEDURE — 97140 MANUAL THERAPY 1/> REGIONS: CPT | Performed by: PHYSICAL THERAPIST

## 2020-11-16 NOTE — FLOWSHEET NOTE
[facility],  [address]  Phone: [phone]  Fax [phone]    Physical Therapy Treatment Note/ Progress Report:           Date:  2020    Patient Name:  Nick Hernandez    :    MRN: [id number]  Restrictions/Precautions:    Medical/Treatment Diagnosis Information:  · Diagnosis: M54.5 (ICD-10-CM) - Lumbar spine painS39.012A (ICD-10-CM) - Strain of lumbar region, initial encounter   Treatment diagnosis:back pain Lumbar pain M54.5  ·   Insurance/Certification information:   anthem  Physician Information:  Referring Practitioner: Suzanna Petty PA-C  Has the plan of care been signed (Y/N):        []  Yes  [x]  No     Date of Patient follow up with Physician:       Is this a Progress Report:     []  Yes  [x]  No        If Yes:  Date Range for reporting period:  [id number]/30/20  Ending    Progress report will be due (10 Rx or 30 days whichever is less): 24       Recertification will be due (POC Duration  / 90 days whichever is less): 8 weeks      Visit # Insurance Allowable Auth Required   In-person 3 ? [x]  Yes []  No    Telehealth   []  Yes []  No    Total          Functional Scale: mod os 21/50 42%   Date assessed:  10/30/20      Therapy Diagnosis/Practice Pattern:F     Number of Comorbidities:  []0     []1-2    [x]3+    Latex Allergy:  [x]NO      []YES  Preferred Language for Healthcare:   [x]English       []other:      Pain level: 3-9/10     SUBJECTIVE: Pt states that she had severe pain at the end of last week after she finished her prednisone pack. Rested over the weekend and feels better today. OBJECTIVE:    Observation: better awareness of TA engagement throughout treatment   Test measurements:      RESTRICTIONS/PRECAUTIONS: H/o osteopenia, CA, DM II, h/o foot fx and patellar fx  Exercises/Interventions:   Access Code: [email or web address]   URL: [email or web address]   Date: 10/30/2020   Prepared by: Hipolito Ayala     Exercises   Supine Knee and Hip Extension with Resistance - 10 reps - 2 sets - 2x daily - 7x weekly   Supine Hip External Rotation Stretch - 3 reps - 1 sets - 20-30 hold - 2x daily - 7x weekly   Quadruped Rock Back (BKA) - 6-10 reps - 1 sets - 2 hold - 2x daily - 7x weekly   Prone Press Up - 6-10 reps - 1 sets - 2 hold - 2x daily - 7x weekly       Therapeutic Ex (05092) Sets/sec Reps Notes/CUES   Hand heel rock x8     Fig 4 stretch   Adjusted pull thigh towards body   Supine dynamic HS            Seated SB Row Green 2x10  Hep 11/16   Seated SB Ext Green 2x10  Hep 11/16   Table side HS S 3x30\" B     Incline S 3x30\"                             Manual Intervention ([id number])      LLD R with manip     Reassessment of pelvis  Level after   STM B lumbar paraspinals; manual prone hip IR/ER S 10'     Modified Kathleene Shadow 2'                 NMR re-education (01059)   CUES NEEDED   HL TA      ADD set with TA 15x5\"  VCs for TA throughout   Supine B clam, U 10x5\"B, x10 ea R/L     LTR  15x5\" R/L     HL march 2x10 R/L     Quadruped LE ext   Significant pelvic instability noted                     Therapeutic Activity (36792)                                                Therapeutic Exercise and NMR EXR  [x] (58938) Provided verbal/tactile cueing for activities related to strengthening, flexibility, endurance, ROM for improvements in LE, proximal hip, and core control with self care, mobility, lifting, ambulation.  [] (49185) Provided verbal/tactile cueing for activities related to improving balance, coordination, kinesthetic sense, posture, motor skill, proprioception  to assist with LE, proximal hip, and core control in self care, mobility, lifting, ambulation and eccentric single leg control.      NMR and Therapeutic Activities:    [x] (92200 or 14377) Provided verbal/tactile cueing for activities related to improving balance, coordination, kinesthetic sense, posture, motor skill, proprioception and motor activation to allow for proper function of core, proximal hip and LE with self care and ADLs  [] (34935) Gait Re-education- Provided training and instruction to the patient for proper LE, core and proximal hip recruitment and positioning and eccentric body weight control with ambulation re-education including up and down stairs     Home Exercise Program:    [x] (12437) Reviewed/Progressed HEP activities related to strengthening, flexibility, endurance, ROM of core, proximal hip and LE for functional self-care, mobility, lifting and ambulation/stair navigation   [] (54276)Reviewed/Progressed HEP activities related to improving balance, coordination, kinesthetic sense, posture, motor skill, proprioception of core, proximal hip and LE for self care, mobility, lifting, and ambulation/stair navigation      Manual Treatments:  PROM / STM / Oscillations-Mobs:  G-I, II, III, IV (PA's, Inf., Post.)  [x] (46081) Provided manual therapy to mobilize LE, proximal hip and/or LS spine soft tissue/joints for the purpose of modulating pain, promoting relaxation,  increasing ROM, reducing/eliminating soft tissue swelling/inflammation/restriction, improving soft tissue extensibility and allowing for proper ROM for normal function with self care, mobility, lifting and ambulation. Modalities:     [] GAME READY (VASO)- for significant edema, swelling, pain control.      Charges:  Timed Code Treatment Minutes: 40   Total Treatment Minutes: 40     [facility] time in/time out:   (and requires time in and out for each CPT code)    [] EVAL (LOW) 02837   [] EVAL (MOD) 36431  [] EVAL (HIGH) 19436   [] RE-EVAL     [x] VR(78690) x 1   [] IONTO  [x] NMR (35311) x 1  [] VASO  [x] Manual (14879) x 1     [] Other:  [] TA x      [] Mech Traction (96886)  [] ES(attended) (95759)      [] ES (un) (93233):       GOALS:   Patient stated goal: pain relief  [] Progressing: [] Met: [] Not Met: [] Adjusted    Therapist goals for Patient:

## 2020-11-19 ENCOUNTER — HOSPITAL ENCOUNTER (OUTPATIENT)
Dept: PHYSICAL THERAPY | Age: 75
Setting detail: THERAPIES SERIES
Discharge: HOME | End: 2020-11-19
Payer: MEDICARE

## 2020-11-19 PROCEDURE — 97112 NEUROMUSCULAR REEDUCATION: CPT | Performed by: PHYSICAL THERAPIST

## 2020-11-19 PROCEDURE — 97140 MANUAL THERAPY 1/> REGIONS: CPT | Performed by: PHYSICAL THERAPIST

## 2020-11-19 NOTE — FLOWSHEET NOTE
[facility], [address]  Phone: [phone]  Fax [phone]    Physical Therapy Treatment Note/ Progress Report:           Date:  2020    Patient Name:  Ignacia Kendrick    :    MRN: [id number]  Restrictions/Precautions:    Medical/Treatment Diagnosis Information:  · Diagnosis: M54.5 (ICD-10-CM) - Lumbar spine painS39.012A (ICD-10-CM) - Strain of lumbar region, initial encounter   Treatment diagnosis:back pain Lumbar pain M54.5  ·   Insurance/Certification information:   anthem  Physician Information:  Referring Practitioner: Johann Vasquez PA-C  Has the plan of care been signed (Y/N):        []  Yes  [x]  No     Date of Patient follow up with Physician:       Is this a Progress Report:     []  Yes  [x]  No        If Yes:  Date Range for reporting period:  Asioomszo68/30/20  Ending    Progress report will be due (10 Rx or 30 days whichever is less):        Recertification will be due (POC Duration  / 90 days whichever is less): 8 weeks      Visit # Insurance Allowable Auth Required   In-person 4 ? [x]  Yes []  No    Telehealth   []  Yes []  No    Total          Functional Scale: mod os 21/50 42%   Date assessed:  10/30/20      Therapy Diagnosis/Practice Pattern:F     Number of Comorbidities:  []0     []1-2    [x]3+    Latex Allergy:  [x]NO      []YES  Preferred Language for Healthcare:   [x]English       []other:      Pain level: 3-9/10     SUBJECTIVE: Pt reports that her back was pretty sore after work today. OBJECTIVE: pt had incorrect appt time, arrived 13' late.  Observation: better awareness of TA engagement throughout treatment   Test measurements:      RESTRICTIONS/PRECAUTIONS: H/o osteopenia, CA, DM II, h/o foot fx and patellar fx  Exercises/Interventions:   Access Code: [email or web address]   URL: [email or web address]   Date: 10/30/2020   Prepared by: Hilaria Coles Exercises   Supine Knee and Hip Extension with Resistance - 10 reps - 2 sets - 2x daily - 7x weekly   Supine Hip External Rotation Stretch - 3 reps - 1 sets - 20-30 hold - 2x daily - 7x weekly   Quadruped Rock Back (BKA) - 6-10 reps - 1 sets - 2 hold - 2x daily - 7x weekly   Prone Press Up - 6-10 reps - 1 sets - 2 hold - 2x daily - 7x weekly       Therapeutic Ex (35150) Sets/sec Reps Notes/CUES   Hand heel rock x10     Fig 4 stretch   Adjusted pull thigh towards body   Supine dynamic HS            Seated SB Row Green 2x10  Hep 11/16   Seated SB Ext Green 2x10  Hep 11/16   Seated SB trunk rot Green 2x10 R/L     Table side HS S      Incline S 3x30\"                             Manual Intervention ([id number] [place] [address])      LLD R with manip     Reassessment of pelvis  Level after   STM B lumbar paraspinals; manual prone hip IR/ER S; HS S 10'     Modified [place] 2'                 NMR re-education (45920)   CUES NEEDED   HL TA      ADD set with TA 15x5\"  Dnd. Hep  VCs for TA throughout   Supine B clam, U 10x5\"B, x10 ea R/L  Dnd. hep   LTR  x20 R/L     HL march 2x10 R/L     Quadruped LE ext x10 R/L  Significant pelvic instability noted   Seated SB Pelvic circles x15 ea CW/CCW   Marches x15 B  SBA        UE support               Therapeutic Activity (49861)                                                Therapeutic Exercise and NMR EXR  [x] (64607) Provided verbal/tactile cueing for activities related to strengthening, flexibility, endurance, ROM for improvements in LE, proximal hip, and core control with self care, mobility, lifting, ambulation.  [] (04335) Provided verbal/tactile cueing for activities related to improving balance, coordination, kinesthetic sense, posture, motor skill, proprioception  to assist with LE, proximal hip, and core control in self care, mobility, lifting, ambulation and eccentric single leg control.      NMR and Therapeutic Activities:    [x] (33181 or 82295) Provided verbal/tactile cueing for activities related to improving balance, coordination, kinesthetic sense, posture, motor skill, proprioception and motor activation to allow for proper function of core, proximal hip and LE with self care and ADLs  [] (12101) Gait Re-education- Provided training and instruction to the patient for proper LE, core and proximal hip recruitment and positioning and eccentric body weight control with ambulation re-education including up and down stairs     Home Exercise Program:    [x] (59230) Reviewed/Progressed HEP activities related to strengthening, flexibility, endurance, ROM of core, proximal hip and LE for functional self-care, mobility, lifting and ambulation/stair navigation   [] (62074)Reviewed/Progressed HEP activities related to improving balance, coordination, kinesthetic sense, posture, motor skill, proprioception of core, proximal hip and LE for self care, mobility, lifting, and ambulation/stair navigation      Manual Treatments:  PROM / STM / Oscillations-Mobs:  G-I, II, III, IV (PA's, Inf., Post.)  [x] (70592) Provided manual therapy to mobilize LE, proximal hip and/or LS spine soft tissue/joints for the purpose of modulating pain, promoting relaxation,  increasing ROM, reducing/eliminating soft tissue swelling/inflammation/restriction, improving soft tissue extensibility and allowing for proper ROM for normal function with self care, mobility, lifting and ambulation. Modalities:     [] GAME READY (VASO)- for significant edema, swelling, pain control.      Charges:  Timed Code Treatment Minutes: 32   Total Treatment Minutes: 32     BWC time in/time out:   (and requires time in and out for each CPT code)    [] EVAL (LOW) 99240   [] EVAL (MOD) 53662  [] EVAL (HIGH) 98917   [] RE-EVAL     [] OS(82324) x    [] IONTO  [x] NMR (48095) x 1  [] VASO  [x] Manual (81246) x 1     [] Other:  [] TA x      [] Mech Traction (35669)  [] ES(attended) (51908)      [] ES (un) (10037):       GOALS:   Patient stated goal: pain relief  [] Progressing: [] Met: [] Not Met: [] Adjusted    Therapist goals for Patient:   Short Term Goals: To be achieved in: 2 weeks  1. Independent in HEP and progression per patient tolerance, in order to prevent re-injury. [] Progressing: [] Met: [] Not Met: [] Adjusted  2. Patient will have a decrease in pain to facilitate improvement in movement, function, and ADLs as indicated by Functional Deficits. [] Progressing: [] Met: [] Not Met: [] Adjusted      Long Term Goals: To be achieved in: 8 weeks  1. Disability index score of 21% or less for the modified oswestry  to assist with reaching prior level of function. [] Progressing: [] Met: [] Not Met: [] Adjusted  2. Patient will demonstrate increased AROM to WNL L spine and increased hip IR and ER by 5-10 degrees , good LS mobility, good hip ROM to allow for proper joint functioning as indicated by patients Functional Deficits. [] Progressing: [] Met: [] Not Met: [] Adjusted  3. Patient will demonstrate an increase in Strength to good proximal hip and core activation to allow for proper functional mobility as indicated by patients Functional Deficits. [] Progressing: [] Met: [] Not Met: [] Adjusted  4. Patient will return to standing and sitting for greater than one hour functional activities without increased symptoms or restriction. [] Progressing: [] Met: [] Not Met: [] Adjusted  5. Pt. To be able to climb stairs without pain or limitation(patient specific functional goal)    [] Progressing: [] Met: [] Not Met: [] Adjusted       Progression Towards Functional goals:  [] Patient is progressing as expected towards functional goals listed. [] Progression is slowed due to complexities listed. [] Progression has been slowed due to co-morbidities.   [x] Plan just implemented, too soon to assess goals progression  [] Other:         Overall Progression Towards Functional goals/ Treatment Progress Update:  [] Patient is progressing as expected

## 2020-11-20 ENCOUNTER — TELEPHONE (OUTPATIENT)
Dept: FAMILY MEDICINE CLINIC | Age: 75
End: 2020-11-20

## 2020-11-23 ENCOUNTER — HOSPITAL ENCOUNTER (OUTPATIENT)
Dept: PHYSICAL THERAPY | Age: 75
Setting detail: THERAPIES SERIES
Discharge: HOME | End: 2020-11-23
Payer: MEDICARE

## 2020-11-23 NOTE — FLOWSHEET NOTE
[facility], [address]        Physical Therapy  Cancellation/No-show Note  Patient Name:  Carol Arcos  :     Date:  2020  Cancelled visits to date: 2  No-shows to date: 0    For today's appointment patient:  ?X  Cancelled  ? Rescheduled appointment  ? No-show     Reason given by patient:  ? X Patient ill  ? Conflicting appointment  ? No transportation    ? Conflict with work  ? No reason given  ? Other:     Comments:  Pt having COVID symptoms; going to get tested.     Electronically signed by:  Herson Land PT

## 2020-11-25 NOTE — TELEPHONE ENCOUNTER
Artem Preciado reiterate to her that she follows up on the issues she saw Roseline for the last time she was at our office with her new PCP please

## 2021-06-01 ENCOUNTER — HOSPITAL ENCOUNTER (OUTPATIENT)
Dept: WOMENS IMAGING | Age: 76
Discharge: HOME | End: 2021-06-01
Payer: MEDICARE

## 2021-06-01 DIAGNOSIS — N63.20 LEFT BREAST LUMP: ICD-10-CM

## 2021-06-01 DIAGNOSIS — C50.411 MALIGNANT NEOPLASM OF UPPER-OUTER QUADRANT OF RIGHT FEMALE BREAST, UNSPECIFIED ESTROGEN RECEPTOR STATUS (HCC): ICD-10-CM

## 2021-06-01 PROCEDURE — G0279 TOMOSYNTHESIS, MAMMO: HCPCS

## 2021-06-01 PROCEDURE — 76642 ULTRASOUND BREAST LIMITED: CPT

## 2021-09-29 LAB — DIABETIC RETINOPATHY: NEGATIVE

## 2022-08-22 ENCOUNTER — HOSPITAL ENCOUNTER (OUTPATIENT)
Dept: WOMENS IMAGING | Age: 77
Discharge: HOME | End: 2022-08-22
Payer: MEDICARE

## 2022-08-22 DIAGNOSIS — Z12.31 ENCOUNTER FOR SCREENING MAMMOGRAM FOR MALIGNANT NEOPLASM OF BREAST: ICD-10-CM

## 2022-08-22 PROCEDURE — 77063 BREAST TOMOSYNTHESIS BI: CPT

## 2023-02-15 ENCOUNTER — HOSPITAL ENCOUNTER (EMERGENCY)
Age: 78
Discharge: ANOTHER HOSPITAL | End: 2023-02-15
Attending: EMERGENCY MEDICINE
Payer: COMMERCIAL

## 2023-02-15 ENCOUNTER — APPOINTMENT (OUTPATIENT)
Dept: CT IMAGING | Age: 78
End: 2023-02-15
Payer: COMMERCIAL

## 2023-02-15 VITALS
BODY MASS INDEX: 20.8 KG/M2 | WEIGHT: 125 LBS | DIASTOLIC BLOOD PRESSURE: 96 MMHG | OXYGEN SATURATION: 98 % | HEART RATE: 85 BPM | SYSTOLIC BLOOD PRESSURE: 181 MMHG | TEMPERATURE: 98 F | RESPIRATION RATE: 19 BRPM

## 2023-02-15 DIAGNOSIS — S22.42XA CLOSED FRACTURE OF MULTIPLE RIBS OF LEFT SIDE, INITIAL ENCOUNTER: ICD-10-CM

## 2023-02-15 DIAGNOSIS — S27.0XXA TRAUMATIC PNEUMOTHORAX, INITIAL ENCOUNTER: Primary | ICD-10-CM
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
## 2023-02-15 PROCEDURE — 99285 EMERGENCY DEPT VISIT HI MDM: CPT

## 2023-02-15 PROCEDURE — 72125 CT NECK SPINE W/O DYE: CPT

## 2023-02-15 PROCEDURE — 3209999900 CT THORACIC SPINE TRAUMA RECONSTRUCTION

## 2023-02-15 PROCEDURE — 6370000000 HC RX 637 (ALT 250 FOR IP): Performed by: PHYSICIAN ASSISTANT

## 2023-02-15 PROCEDURE — 71250 CT THORAX DX C-: CPT

## 2023-02-15 PROCEDURE — 70450 CT HEAD/BRAIN W/O DYE: CPT

## 2023-02-15 RX ORDER — ACETAMINOPHEN 500 MG
1000 TABLET ORAL ONCE
Status: COMPLETED | OUTPATIENT
Start: 2023-02-15 | End: 2023-02-15

## 2023-02-15 RX ORDER — LIDOCAINE 4 G/G
1 PATCH TOPICAL ONCE
Status: DISCONTINUED | OUTPATIENT
Start: 2023-02-15 | End: 2023-02-15 | Stop reason: HOSPADM

## 2023-02-15 RX ADMIN — ACETAMINOPHEN 1000 MG: 500 TABLET ORAL at 14:15

## 2023-02-15 ASSESSMENT — PAIN SCALES - GENERAL
PAINLEVEL_OUTOF10: 6
PAINLEVEL_OUTOF10: 4

## 2023-02-15 ASSESSMENT — PAIN - FUNCTIONAL ASSESSMENT: PAIN_FUNCTIONAL_ASSESSMENT: 0-10

## 2023-02-15 NOTE — CARE COORDINATION
Transport scheduled with [address] ([phone]) at 2115 to take patient to [facility] ER.     5:02 PM  Cancelled transport per CM request.

## 2023-02-15 NOTE — ED PROVIDER NOTES
[facility]  ED  EMERGENCY DEPARTMENT ENCOUNTER        Pt Name: Ratna Guerra  MRN: [id number]  [place] [birth year]  Date of evaluation: 2/15/2023  Provider: Othella Lennox, PA-C  PCP: MARY Elias - CNP  Note Started: 2:27 PM EST 2/15/23       I have seen and evaluated this patient with my supervising physician Wanda Vora COMPLAINT       Chief Complaint   Patient presents with    Fall     Tripped over carpet while at work, has neck pain, arrived in c-collar, no loc,, pt is alert and oriented       HISTORY OF PRESENT ILLNESS: 1 or more Elements     History From: patient  Limitations to history : None    Ratna Guerra is a 69 y.o. female who presents to the emergency department with a chief complaint of some left upper back pain, shortness of breath and neck and mid back pain after she fell just before presenting to the emergency department at work. She states her shoes got caught in the carpet causing her to fall onto her side. She has not attempted to ambulate since this happened and presented in cervical collar. She states she may have hit her head mildly but denies headache or loss of consciousness or use of anticoagulants. Denies seizures, nausea, vomiting, visual changes, difficulty moving her extremities, abdominal pain, wounds or any other symptoms. Nursing Notes were all reviewed and agreed with or any disagreements were addressed in the HPI. REVIEW OF SYSTEMS :      Review of Systems    Positives and Pertinent negatives as per HPI.      SURGICAL HISTORY     Past Surgical History:   Procedure Laterality Date    BREAST SURGERY Right 12/9/2013    lumpectomy with sentinel node biopsy    COLONOSCOPY  02/21/2017    polyp, diverticulosis    THYROIDECTOMY  9/2004    thyroid CA  (papillary) - Dr. Marybel Vazquez       Discharge Medication List as of 2/15/2023  5:04 PM        CONTINUE these medications which have NOT CHANGED    Details   methylPREDNISolone (MEDROL DOSEPACK) 4 MG tablet Take by mouth as directed., Disp-21 kit,R-0Normal      meloxicam (MOBIC) 15 MG tablet Take 1 tablet by mouth daily, Disp-30 tablet,R-3Normal      baclofen (LIORESAL) 10 MG tablet Take 0.5 tablets by mouth nightly as needed (muscle spasms), Disp-15 tablet,R-0Normal      levothyroxine (SYNTHROID) 100 MCG tablet Take 1 tablet by mouth daily, Disp-30 tablet,R-1Normal      L-Methylfolate-A74-Y2-L7 (L-METHYL-MC) 6-1-50-5 MG TABS Take by mouthHistorical Med      ondansetron (ZOFRAN) 4 MG tablet Take 1 tablet by mouth every 8 hours as needed for Nausea or Vomiting, Disp-12 tablet,R-1Normal      DULoxetine (CYMBALTA) 60 MG extended release capsule Take 1 capsule by mouth daily (instead of Zoloft). , Disp-90 capsule,R-1Normal      primidone (MYSOLINE) 50 MG tablet Take 1 tablet by mouth 2 times daily, Disp-180 tablet, R-1Normal      simvastatin (ZOCOR) 40 MG tablet TAKE 1 TABLET EVERY NIGHT FOR HIGH CHOLESTEROL, Disp-90 tablet, R-1Normal      metFORMIN (GLUCOPHAGE-XR) 500 MG extended release tablet Take 2 tablets by mouth 2 times daily (with meals), Disp-360 tablet, R-1Normal      traZODone (DESYREL) 50 MG tablet Take 1 tablet by mouth nightly, Disp-90 tablet, R-1Normal      Calcium Carbonate-Vitamin D (CALCIUM-VITAMIN D3 PO) Take 1 capsule by mouth dailyHistorical Med      alendronate (FOSAMAX) 70 MG tablet Take 1 tablet by mouth every 7 days As directed., Disp-12 tablet, R-3Normal      blood glucose monitor strips TESTS BID  DX: E11.9  Please fill brand per insurance coverage, Disp-100 strip, R-5, Normal      Lancets MISC Disp-100 each, R-3, NormalTESTS BID  DX: E11.9  Please fill brand per insurance coverage      multivitamin (THERAGRAN) per tablet Take 1 tablet by mouth daily. aspirin 81 MG EC tablet Take 81 mg by mouth daily.              ALLERGIES     Ciprofloxacin, Naprosyn [naproxen], Other, Paxil [paroxetine], Pravachol [pravastatin sodium], Pravastatin, Prozac [fluoxetine hcl], Wellbutrin [bupropion hcl], Amoxicillin, Ampicillin, Lipitor, and Sulfa antibiotics    FAMILYHISTORY       Family History   Problem Relation Age of Onset    Diabetes Daughter 5        FLORENTINO (Type I)    Cancer Daughter         breast CA    Cancer Daughter         thyroid CA    Cancer Father         SOCIAL HISTORY       Social History     Tobacco Use    Smoking status: Former     Packs/day: 1.50     Years: 10.00     Pack years: 15.00     Types: Cigarettes     Quit date: 1986     Years since quittin.1    Smokeless tobacco: Never    Tobacco comments:     Quit 2 times total   Substance Use Topics    Alcohol use: Yes     Alcohol/week: 0.0 - 2.0 standard drinks     Comment: occas - 1-2/mo    Drug use: No       SCREENINGS        Karsten Coma Scale  Eye Opening: Spontaneous  Best Verbal Response: Oriented  Best Motor Response: Obeys commands  Karsten Coma Scale Score: 15                CIWA Assessment  BP: (!) 181/96  Heart Rate: 85           PHYSICAL EXAM  1 or more Elements     ED Triage Vitals [02/15/23 1346]   BP Temp Temp Source Heart Rate Resp SpO2 Height Weight   (!) 210/93 98 °F (36.7 °C) Oral 76 16 99 % -- 125 lb (56.7 kg)       Physical Exam  Vitals and nursing note reviewed. Constitutional:       Appearance: She is well-developed. She is not diaphoretic. HENT:      Head: Atraumatic. Nose: Nose normal.   Eyes:      General:         Right eye: No discharge. Left eye: No discharge. Cardiovascular:      Rate and Rhythm: Normal rate and regular rhythm. Heart sounds: No murmur heard. No friction rub. No gallop. Pulmonary:      Effort: Pulmonary effort is normal. No respiratory distress. Breath sounds: No stridor. No wheezing, rhonchi or rales. Abdominal:      General: Bowel sounds are normal. There is no distension. Palpations: Abdomen is soft. There is no mass. Tenderness: There is no abdominal tenderness.  There is no guarding or rebound. Hernia: No hernia is present. Musculoskeletal:         General: Tenderness present. No swelling. Normal range of motion. Cervical back: Normal range of motion. Comments: There are some generalized tenderness over the cervical and thoracic spine. No midline lumbar spine or step-off deformity in the neck or back. Gross full range of motion throughout all 4 extremities. She also has some tenderness over the left posterior ribs and paravertebral musculature. No wounds or bruising or obvious sign of trauma. Skin:     General: Skin is warm and dry. Findings: No erythema or rash. Neurological:      Mental Status: She is alert and oriented to person, place, and time. Cranial Nerves: No cranial nerve deficit. Psychiatric:         Behavior: Behavior normal.           DIAGNOSTIC RESULTS   LABS:    Labs Reviewed - No data to display    When ordered only abnormal lab results are displayed. All other labs were within normal range or not returned as of this dictation. EKG: When ordered, EKG's are interpreted by the Emergency Department Physician in the absence of a cardiologist.  Please see their note for interpretation of EKG. RADIOLOGY:   Non-plain film images such as CT, Ultrasound and MRI are read by the radiologist. Plain radiographic images are visualized and preliminarily interpreted by the ED Provider with the below findings:        Interpretation per the Radiologist below, if available at the time of this note:    CT CHEST [address]   Final Result   Mild to moderate left pneumothorax without evidence of tension. There are   associated minimally displaced fractures of the left 3rd, 5th and 6th ribs. Mild scarring noted in the the right middle lobe and lingula. Minor calcifications in the region of the aortic valve. Large cyst noted in the upper pole of the left kidney.          CT THORACIC SPINE TRAUMA RECONSTRUCTION   Final Result   No acute abnormality of the thoracic spine. CT CSpine W/O Contrast   Final Result   No acute fracture or subluxation of the cervical spine. Known mild-to-moderate left pneumothorax. Please see concurrent report of CT   chest for details. CT Head W/O Contrast   Final Result   No acute intracranial abnormality. No results found. No results found. PROCEDURES   Unless otherwise noted below, none     Procedures    CRITICAL CARE TIME (.cctime)       PAST MEDICAL HISTORY      has a past medical history of Allergic rhinitis, Breast cancer of upper-outer quadrant of right female breast ([facility] (12/12/2013), [facility] ([facility]), Cervical radicular pain, Depression, Hyperlipidemia, Hypertension, Hypothyroidism, Type II or unspecified type diabetes mellitus without mention of complication, not stated as uncontrolled, and Unspecified sprain of left wrist, initial encounter (9/9/2016). EMERGENCY DEPARTMENT COURSE and DIFFERENTIAL DIAGNOSIS/MDM:   Vitals:    Vitals:    02/15/23 1346 02/15/23 1429 02/15/23 1523 02/15/23 1631   BP: (!) 210/93   (!) 181/96   Pulse: 76 74 82 85   Resp: 16 20 15 19   Temp: 98 °F (36.7 °C)      TempSrc: Oral      SpO2: 99% 100% 95% 98%   Weight: 125 lb (56.7 kg)          Patient was given the following medications:  Medications   acetaminophen (TYLENOL) tablet 1,000 mg (1,000 mg Oral Given 2/15/23 1415)             Is this patient to be included in the SEP-1 Core Measure due to severe sepsis or septic shock? No   Exclusion criteria - the patient is NOT to be included for SEP-1 Core Measure due to:   Infection is not suspected    Chronic Conditions affecting care:    has a past medical history of Allergic rhinitis, Breast cancer of upper-outer quadrant of right female breast ([facility] (12/12/2013), [facility] (11/[facility]), Cervical radicular pain, Depression, Hyperlipidemia, Hypertension, Hypothyroidism, Type II or unspecified type diabetes mellitus without mention of complication, not stated as uncontrolled, and Unspecified sprain of left wrist, initial encounter (9/9/2016). CONSULTS: (Who and What was discussed)  None      Social Determinants Significantly Affecting Health : None    Records Reviewed (External and Source)     CC/HPI Summary, DDx, ED Course, and Reassessment: Patient presented with some left upper back pain and neck pain. She has some generalized tenderness of the cervical and thoracic spine bony point tenderness or step-off deformity. She remained in her cervical collar here. She did slightly hit her head also. She is on anticoagulation. She states she did feel slightly short of breath with this. CT of the chest reveals a mild to moderate pneumothorax with rib fractures of the left third, fifth and sixth ribs. Cervical collar was removed. She was placed on nasal cannula oxygen. Attending physician did discuss this with  trauma patient will be transferred tender multiple rib fractures with pneumothorax. At this time will not place chest tube and will continually observe and repeat image at [facility]. Low special for skull fracture, subdural hematoma, cord injury. She has neurologically intact. Patient was stable at time of transfer. Disposition Considerations (tests considered but not done, Admit vs D/C, Shared Decision Making, Pt Expectation of Test or Tx.):        I am the Primary Clinician of Record. FINAL IMPRESSION      1. Traumatic pneumothorax, initial encounter    2. Closed fracture of multiple ribs of left side, initial encounter    3. Fall, initial encounter          DISPOSITION/PLAN     DISPOSITION Decision To Transfer 02/15/2023 03:49:48 PM      PATIENT REFERRED TO:  No follow-up provider specified.     DISCHARGE MEDICATIONS:  Discharge Medication List as of 2/15/2023  5:04 PM          DISCONTINUED MEDICATIONS:  Discharge Medication List as of 2/15/2023  5:04 PM                 (Please note that portions of this note were completed with a voice recognition program.  Efforts were made to edit the dictations but occasionally words are mis-transcribed.)    Smitha Camarena PA-C (electronically signed)        Smitha Camarena PA-C  02/15/23 1936

## 2023-02-15 NOTE — ED NOTES
Patient transported to [facility] ED via [employer]. Patient resps unlabored, GCS 15, IV patent and in place. Patient sent with face sheet, copy to ED care timeline, and EMTALA forms. Patient's daughter took patient's belongings.      Brady Haines RN  02/15/23 9915

## 2023-02-15 NOTE — ED PROVIDER NOTES
I independently performed a history and physical on Evelyn Francis. I personally saw the patient and performed a substantive portion of the visit including all aspects of the medical decision making. All diagnostic, treatment, and disposition decisions were made by myself in conjunction with the advanced practice provider. I have participated in the medical decision making and directed the treatment plan and disposition of the patient. For further details of [place] [address] emergency department encounter, please see the advanced practice provider's documentation. CHIEF COMPLAINT  Chief Complaint   Patient presents with    Fall     Tripped over carpet while at work, has neck pain, arrived in c-collar, no loc,, pt is alert and oriented       Briefly, Evelyn Francis is a 69 y.o. female  who presents to the ED complaining of left posterior back pain. States just prior to arrival she tripped, and landed on a desk with the corner striking her in the left posterior back. Having pain at that site. Does not think she hit her head or lost consciousness. On no anticoagulation    FOCUSED PHYSICAL EXAMINATION  BP (!) 210/93   Pulse 76   Temp 98 °F (36.7 °C) (Oral)   Resp 16   Wt 125 lb (56.7 kg)   LMP 02/03/1997   SpO2 99%   BMI 20.80 kg/m²      Focused physical examination:  General appearance:  Cooperative. No acute distress. Skin:  Warm. Dry. Eye:  Extraocular movements intact. Ears, nose, mouth and throat:  Oral mucosa moist,  Neck:  Trachea midline. Heart:  Regular rate and rhythm  Perfusion:  intact  Respiratory:  Respirations nonlabored. Lungs clear to auscultation bilaterally. Abdominal:   Non distended. Nontender  Neurological:  Alert and oriented x 3. Moves all extremities spontaneously  Musculoskeletal:   Normal ROM, no deformities. Pelvis stable. Normal flexion extension of bilateral hips. No gross long bone deformities.   Tenderness to palpation to the left posterior lateral chest wall with some crepitus with inspiration. No overlying skin changes. Psychiatric:  Normal mood    Differential Diagnosis: Chest contusion, rib fracture, pneumothorax    Patient status post mechanical fall with left posterior lateral chest wall injury. CT scan shows 3 contiguous rib fractures with small to moderate pneumothorax. I reviewed the imaging myself and it seems to be rather small pneumothorax. On evaluation the patient has no abdominal tenderness whatsoever. She is not on any anticoagulation. Imaging of the head and neck were negative. She is resting comfortably. No hypoxia, tachypnea, tachycardia, or hypotension. She was placed on 5 L nasal cannula oxygen to aid with pneumothorax resorption. Case was discussed with Brianna and Dr. Connie Sandhu at the [facility] and they have accepted the patient in transfer for 4 trauma surgery evaluation. I discussed the size of the pneumothorax, the patient's overall stability and we have elected not to place a chest tube at this time as this likely can be treated with observation only and repeat serial chest x-rays. Should the patient have any decomposition such as tachycardia, hypoxia, hypotension we will perform a repeat chest x-ray to evaluate the size of the pneumothorax. Low threshold to place chest tube should the patient be worsening but at this time we will hold off    CRITICAL CARE TIME    I personally saw the patient and independently provided 30 minutes of non-concurrent critical care out of the total shared critical care time provided, excluding separately reportable procedures. There was a high probability of clinically significant/life threatening deterioration in the patient's condition which required my urgent intervention.   This time was spent reviewing the patient chart, interpreting diagnostic/laboratory data, emergent evaluation for traumatic injury, speaking with consulting and transferring emergency department and trauma surgery physicians and arranging transfer      History From: Patient         Chronic Conditions: Noted in HPI    CONSULTS: (Who and What was discussed)  None            Disposition Considerations (Tests not ordered but considered, Shared Decision Making, Pt Expectation of Test or Tx.): Discussed chest tube placement with patient as well as trauma surgery in the emergency department at the [facility] and are holding off at this time    During the patient's ED course, the patient was given:  Medications   lidocaine 4 % external patch 1 patch (1 patch TransDERmal Patch Applied 2/15/23 1416)   acetaminophen (TYLENOL) tablet 1,000 mg (1,000 mg Oral Given 2/15/23 1415)        CLINICAL IMPRESSION  1. Traumatic pneumothorax, initial encounter    2. Closed fracture of multiple ribs of left side, initial encounter    3. Fall, initial encounter        DISPOSITION  Transfer      This chart was created using Dragon dictation software. Efforts were made by me to ensure accuracy, however some errors may be present due to limitations of this technology.             Bud Dandy, MD  02/15/23 8914

## 2023-03-16 ENCOUNTER — HOSPITAL ENCOUNTER (OUTPATIENT)
Dept: WOMENS IMAGING | Age: 78
End: 2023-03-16
Payer: MEDICARE

## 2023-03-16 ENCOUNTER — HOSPITAL ENCOUNTER (OUTPATIENT)
Dept: WOMENS IMAGING | Age: 78
Discharge: HOME | End: 2023-03-16
Payer: MEDICARE

## 2023-03-16 DIAGNOSIS — N63.22 LUMP IN UPPER INNER QUADRANT OF LEFT BREAST: ICD-10-CM

## 2023-03-16 PROCEDURE — 77065 DX MAMMO INCL CAD UNI: CPT

## 2023-08-24 ENCOUNTER — HOSPITAL ENCOUNTER (OUTPATIENT)
Dept: WOMENS IMAGING | Age: 78
Discharge: HOME | End: 2023-08-24
Payer: MEDICARE

## 2023-08-24 DIAGNOSIS — Z12.31 ENCOUNTER FOR SCREENING MAMMOGRAM FOR MALIGNANT NEOPLASM OF BREAST: ICD-10-CM

## 2023-08-24 DIAGNOSIS — Z78.0 MENOPAUSE: ICD-10-CM

## 2023-08-24 PROCEDURE — 77080 DXA BONE DENSITY AXIAL: CPT

## 2023-08-24 PROCEDURE — 77063 BREAST TOMOSYNTHESIS BI: CPT

## 2024-01-27 ENCOUNTER — OFFICE VISIT (OUTPATIENT)
Age: 79
End: 2024-01-27

## 2024-01-27 VITALS
HEART RATE: 97 BPM | TEMPERATURE: 98.4 F | DIASTOLIC BLOOD PRESSURE: 81 MMHG | SYSTOLIC BLOOD PRESSURE: 122 MMHG | OXYGEN SATURATION: 97 %

## 2024-01-27 DIAGNOSIS — S01.81XA FACIAL LACERATION, INITIAL ENCOUNTER: Primary | ICD-10-CM

## 2024-01-27 ASSESSMENT — ENCOUNTER SYMPTOMS
CONSTIPATION: 0
VOMITING: 0
DIARRHEA: 0
CHEST TIGHTNESS: 0
SHORTNESS OF BREATH: 0
COUGH: 0
ABDOMINAL PAIN: 0
NAUSEA: 0

## 2024-01-27 NOTE — PROGRESS NOTES
Lynette Ornelas (:  1945) is a 78 y.o. female,New patient, here for evaluation of the following chief complaint(s):  Facial Injury (Chin  right side )      ASSESSMENT/PLAN:    ICD-10-CM    1. Facial laceration, initial encounter  S01.81XA           Patient presents for facial laceration. This was irrigated and steri strips used. Patient educated on signs of infection  Patient is advised that if she feels her legs give out again or if she has dizziness she should immediately go to ED.  Patient states that she will call daughter to be with her to monitor.  Follow up with PCP for evaluation of legs giving out    SUBJECTIVE/OBJECTIVE:    History provided by:  Patient   used: No    Facial Injury   Pertinent negatives include no vomiting.     HPI:   78 y.o. female presents with symptoms of for facial injury ongoing since today. She states that her legs gave out and she fell and landed on wood floor and hit her chin. She states that she is not having any dizziness or light headedness. She did hit her head. Is not on blood thinners. Patient states that her legs giving out have been going on for a while and she is going to follow up with PCP for this. Patient does live alone. She states that she hit her chin and has been applying pressure to chin to control bleeding. Patient denies frequent falls.     Vitals:    24 1045   BP: 122/81   Pulse: 97   Temp: 98.4 °F (36.9 °C)   TempSrc: Temporal   SpO2: 97%       Review of Systems   Constitutional:  Negative for fatigue and fever.   Respiratory:  Negative for cough, chest tightness and shortness of breath.    Cardiovascular:  Negative for chest pain.   Gastrointestinal:  Negative for abdominal pain, constipation, diarrhea, nausea and vomiting.   Musculoskeletal:  Negative for neck pain and neck stiffness.   Skin:  Negative for rash.       Physical Exam  Vitals and nursing note reviewed.   Constitutional:       Appearance: Normal appearance.

## 2024-01-27 NOTE — PATIENT INSTRUCTIONS
Please continue to monitor symptoms and please return to urgent care if you develop fevers, redness surrounding the area, and drainage as these are signs of infection.  Please follow up with your PCP.

## 2024-02-21 ENCOUNTER — HOSPITAL ENCOUNTER (EMERGENCY)
Age: 79
Discharge: HOME | End: 2024-02-21
Payer: MEDICARE

## 2024-02-21 ENCOUNTER — APPOINTMENT (OUTPATIENT)
Dept: CT IMAGING | Age: 79
End: 2024-02-21
Payer: MEDICARE

## 2024-02-21 VITALS
OXYGEN SATURATION: 99 % | RESPIRATION RATE: 16 BRPM | BODY MASS INDEX: 17.49 KG/M2 | TEMPERATURE: 98.2 F | HEART RATE: 84 BPM | SYSTOLIC BLOOD PRESSURE: 164 MMHG | HEIGHT: 65 IN | DIASTOLIC BLOOD PRESSURE: 79 MMHG | WEIGHT: 105 LBS

## 2024-02-21 DIAGNOSIS — S01.81XA FACIAL LACERATION, INITIAL ENCOUNTER: Primary | ICD-10-CM

## 2024-02-21 DIAGNOSIS — S09.90XA CLOSED HEAD INJURY, INITIAL ENCOUNTER: ICD-10-CM
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
## 2024-02-21 PROCEDURE — 99284 EMERGENCY DEPT VISIT MOD MDM: CPT

## 2024-02-21 PROCEDURE — 70450 CT HEAD/BRAIN W/O DYE: CPT

## 2024-02-21 PROCEDURE — 12011 RPR F/E/E/N/L/M 2.5 CM/<: CPT

## 2024-02-21 ASSESSMENT — PAIN - FUNCTIONAL ASSESSMENT: PAIN_FUNCTIONAL_ASSESSMENT: 0-10

## 2024-02-21 ASSESSMENT — PAIN SCALES - GENERAL: PAINLEVEL_OUTOF10: 3

## 2024-02-21 ASSESSMENT — PAIN DESCRIPTION - LOCATION: LOCATION: HEAD

## 2024-02-21 NOTE — ED NOTES
Completed wound care on pt's facial lacerations. Lacerations were cleaned with chlorhexidine, gauze pads, and normal saline. Lacerations were appropriately scrubbed out and irrigated. Pt tolerated well.

## 2024-02-21 NOTE — ED PROVIDER NOTES
Hyperlipidemia, Hypertension, Hypothyroidism, Type II or unspecified type diabetes mellitus without mention of complication, not stated as uncontrolled, and Unspecified sprain of left wrist, initial encounter (09/09/2016).    CONSULTS: (Who and What was discussed)  None    Records Reviewed (External and Source)     CC/HPI Summary, DDx, ED Course, and Reassessment: Lynette Ornelas presented to the ER with the above noted complaints. Physical exam reveals laceration just proximal to the right eyebrow. The patient is neurologically intact: GCS is 15, CN II-XII intact, no focal or lateralizing neurologic deficits on exam. The patient is without signs of basilar skull fracture. Based on [place] CT criteria a CT scan of the head was performed and showed no acute findings. Laceration repaired per above procedure note. The patient was instructed on wound care, signs and symptoms of infection, when to return to a health care provider. The patient verbalized understanding and agreement with the plan.    At this point I do not feel the patient requires further work up and it is reasonable to discharge the patient.     A discussion was had with the patient regarding diagnosis, diagnostic testing results, treatment/ plan of care, and follow up.  All questions were answered. Patient will follow up as directed for further evaluation/treatment. The patient was given strict return precautions as we discussed symptoms that would necessitate return to the ED. Patient will return to ED for new/worsening symptoms. The patient verbalized their understanding and agreement with the above plan.    Please refer to AVS for further details of the discharge instructions.    Disposition Considerations (tests considered but not done, Admit vs D/C, Shared Decision Making, Pt Expectation of Test or Tx.): I estimate there is LOW risk for SUBARACHNOID HEMORRHAGE, MENINGITIS, INTRACRANIAL HEMORRHAGE, SUBDURAL HEMATOMA, OR STROKE, thus I consider

## 2024-02-21 NOTE — ED NOTES
Discharge instructions reviewed with Pt. Pt verbalizes understanding at this time.  VS as noted. Pt condition stable at this time. No concerns voiced.

## 2024-03-03 ENCOUNTER — OFFICE VISIT (OUTPATIENT)
Age: 79
End: 2024-03-03

## 2024-03-03 VITALS — HEIGHT: 65 IN | BODY MASS INDEX: 17.49 KG/M2 | WEIGHT: 105 LBS

## 2024-03-03 DIAGNOSIS — S01.81XD FACIAL LACERATION, SUBSEQUENT ENCOUNTER: Primary | ICD-10-CM

## 2024-03-03 RX ORDER — LANOLIN ALCOHOL/MO/W.PET/CERES
3 CREAM (GRAM) TOPICAL DAILY
COMMUNITY

## 2024-05-01 ENCOUNTER — OFFICE VISIT (OUTPATIENT)
Age: 79
End: 2024-05-01

## 2024-05-01 VITALS
HEART RATE: 93 BPM | SYSTOLIC BLOOD PRESSURE: 137 MMHG | BODY MASS INDEX: 17.49 KG/M2 | WEIGHT: 105 LBS | HEIGHT: 65 IN | TEMPERATURE: 98 F | RESPIRATION RATE: 16 BRPM | OXYGEN SATURATION: 94 % | DIASTOLIC BLOOD PRESSURE: 88 MMHG

## 2024-05-01 DIAGNOSIS — R30.0 DYSURIA: Primary | ICD-10-CM

## 2024-05-01 DIAGNOSIS — N30.00 ACUTE CYSTITIS WITHOUT HEMATURIA: ICD-10-CM

## 2024-05-01 LAB
BILIRUBIN, POC: ABNORMAL
BLOOD URINE, POC: ABNORMAL
CLARITY, POC: CLEAR
COLOR, POC: YELLOW
GLUCOSE URINE, POC: ABNORMAL
KETONES, POC: 15
LEUKOCYTE EST, POC: ABNORMAL
NITRITE, POC: ABNORMAL
PH, POC: 5.5
PROTEIN, POC: 100
SPECIFIC GRAVITY, POC: 1.02
UROBILINOGEN, POC: 1

## 2024-05-01 RX ORDER — ATORVASTATIN CALCIUM 40 MG/1
40 TABLET, FILM COATED ORAL DAILY
COMMUNITY
Start: 2024-02-23 | End: 2024-08-21

## 2024-05-01 RX ORDER — NITROFURANTOIN 25; 75 MG/1; MG/1
100 CAPSULE ORAL 2 TIMES DAILY
Qty: 14 CAPSULE | Refills: 0 | Status: SHIPPED | OUTPATIENT
Start: 2024-05-01 | End: 2024-05-08

## 2024-05-01 RX ORDER — PHENAZOPYRIDINE HYDROCHLORIDE 200 MG/1
200 TABLET, FILM COATED ORAL 3 TIMES DAILY PRN
Qty: 6 TABLET | Refills: 0 | Status: SHIPPED | OUTPATIENT
Start: 2024-05-01 | End: 2024-05-04

## 2024-05-01 ASSESSMENT — ENCOUNTER SYMPTOMS
COUGH: 0
CHEST TIGHTNESS: 0
SHORTNESS OF BREATH: 0
VOMITING: 0
NAUSEA: 0

## 2024-05-01 NOTE — PROGRESS NOTES
1.651 m (5' 5\")       Review of Systems   Constitutional:  Negative for chills and fever.   Respiratory:  Negative for cough, chest tightness and shortness of breath.    Cardiovascular:  Negative for chest pain.   Gastrointestinal:  Negative for nausea and vomiting.   Genitourinary:  Positive for dysuria, frequency and urgency. Negative for flank pain, hematuria and hesitancy.       Physical Exam  Constitutional:       Appearance: Normal appearance.   HENT:      Nose: Nose normal.      Mouth/Throat:      Mouth: Mucous membranes are moist.   Eyes:      Conjunctiva/sclera: Conjunctivae normal.   Cardiovascular:      Rate and Rhythm: Normal rate and regular rhythm.   Pulmonary:      Effort: Pulmonary effort is normal.      Breath sounds: Normal breath sounds.   Abdominal:      General: Bowel sounds are normal. There is no distension.      Tenderness: There is no abdominal tenderness. There is no right CVA tenderness, left CVA tenderness, guarding or rebound.   Skin:     General: Skin is warm and dry.   Neurological:      Mental Status: She is alert and oriented to person, place, and time.   Psychiatric:         Behavior: Behavior normal.           An electronic signature was used to authenticate this note.    --MARY Urbina - CELESTE          An electronic signature was used to authenticate this note.      MARY Urbina - CNP

## 2024-05-03 ENCOUNTER — OFFICE VISIT (OUTPATIENT)
Age: 79
End: 2024-05-03

## 2024-05-03 VITALS
RESPIRATION RATE: 16 BRPM | OXYGEN SATURATION: 95 % | HEIGHT: 65 IN | SYSTOLIC BLOOD PRESSURE: 136 MMHG | TEMPERATURE: 97.4 F | WEIGHT: 105 LBS | BODY MASS INDEX: 17.49 KG/M2 | HEART RATE: 83 BPM | DIASTOLIC BLOOD PRESSURE: 87 MMHG

## 2024-05-03 DIAGNOSIS — R30.0 DYSURIA: ICD-10-CM

## 2024-05-03 DIAGNOSIS — N30.01 ACUTE CYSTITIS WITH HEMATURIA: Primary | ICD-10-CM

## 2024-05-03 LAB
BILIRUBIN, POC: NORMAL
BLOOD URINE, POC: NORMAL
CLARITY, POC: NORMAL
COLOR, POC: NORMAL
GLUCOSE URINE, POC: NORMAL
KETONES, POC: NORMAL
LEUKOCYTE EST, POC: NORMAL
NITRITE, POC: POSITIVE
PH, POC: 5
PROTEIN, POC: NORMAL
SPECIFIC GRAVITY, POC: 1.01
UROBILINOGEN, POC: NORMAL

## 2024-05-03 ASSESSMENT — ENCOUNTER SYMPTOMS
DIARRHEA: 0
VOMITING: 0

## 2024-05-03 NOTE — PATIENT INSTRUCTIONS
Keep hydrated, tylenol   (if no contraindications) as needed if pain or fever..  follow up in  5 days if not better    go to the ER if symptoms worse/feeling worse or has new symptoms or concerns (fever-chills-vomiting-increasing abdominal/back pain)    Patient asked to call office tomorrow to see if urine culture sensitivities are resulted...     Advised follow up with urologist

## 2024-05-03 NOTE — PROGRESS NOTES
Lynette Ornelas (:  1945) is a 78 y.o. female,Established patient, here for evaluation of the following chief complaint(s):  Dysuria (Seen 2 days ago, feels the infection is getting worse)      ASSESSMENT/PLAN:    ICD-10-CM    1. Acute cystitis with hematuria  N30.01       2. Dysuria  R30.0 POCT Urinalysis no Micro     Culture, Urine        Results for POC orders placed in visit on 24   POCT Urinalysis no Micro   Result Value Ref Range    Color, UA ORANGE     Clarity, UA OTHER     Glucose, UA  mg/dL     Bilirubin, UA LARGE     Ketones, UA >=160 mg/dL     Spec Grav, UA 1.015     Blood, UA POC LARGE     pH, UA 5.0     Protein, UA POC >=300 mg/dL     Urobilinogen, UA >=8.0 E.U./dL     Leukocytes, UA LARGE     Nitrite, UA POSITIVE       Will also send out another culture  (patient felt fine yesterday)but suspect will  still find same bacterial infection)    Will continue macrobid and pyridium, avoid ibuprofen  Patient willing to wait to see if sensitivity report is back tomorrow before considering change of antibiotic ( offered to switch) (also because she is allergic to other antibiotics which might be effective  such as Cipro,  if not sensitive to macrobid --will see if sensitive to doxycycline) )   instructed to go to the ER if feeling worse as noted below    Keep hydrated, tylenol   (if no contraindications) as needed if pain or fever..  follow up in  5 days if not better    go to the ER if symptoms worse/feeling worse or has new symptoms or concerns (fever-chills-vomiting-increasing abdominal/back pain)    Patient asked to call office tomorrow to see if urine culture sensitivities are resulted...   Advised follow up with urologist     SUBJECTIVE/OBJECTIVE:  Patient presents with:  Dysuria: Seen 2 days ago, () and started macrobid...s feels the infection is getting worse     says she felt fine yesterday,  but today again with urgency/frequency/dysuria. blood noted...no fever/chills.. no

## 2024-05-04 DIAGNOSIS — N39.0 ACUTE UTI: Primary | ICD-10-CM

## 2024-05-04 DIAGNOSIS — E11.9 CONTROLLED TYPE 2 DIABETES MELLITUS WITHOUT COMPLICATION, WITHOUT LONG-TERM CURRENT USE OF INSULIN (HCC): ICD-10-CM

## 2024-05-04 LAB
BACTERIA UR CULT: ABNORMAL
BACTERIA UR CULT: ABNORMAL
ORGANISM: ABNORMAL

## 2024-05-04 RX ORDER — CIPROFLOXACIN 500 MG/1
500 TABLET, FILM COATED ORAL 2 TIMES DAILY
Qty: 14 TABLET | Refills: 0 | Status: SHIPPED | OUTPATIENT
Start: 2024-05-04 | End: 2024-05-11

## 2024-05-04 RX ORDER — ONDANSETRON 4 MG/1
4 TABLET, ORALLY DISINTEGRATING ORAL 3 TIMES DAILY PRN
Qty: 21 TABLET | Refills: 0 | Status: SHIPPED | OUTPATIENT
Start: 2024-05-04

## 2024-05-04 RX ORDER — PROMETHAZINE HYDROCHLORIDE 25 MG/1
25 TABLET ORAL 3 TIMES DAILY PRN
Qty: 20 TABLET | Refills: 0 | Status: SHIPPED | OUTPATIENT
Start: 2024-05-04 | End: 2024-05-11

## 2024-05-05 LAB
BACTERIA UR CULT: ABNORMAL
ORGANISM: ABNORMAL

## 2024-05-06 LAB
BACTERIA UR CULT: ABNORMAL
ORGANISM: ABNORMAL

## 2024-06-07 ENCOUNTER — HOSPITAL ENCOUNTER (OUTPATIENT)
Dept: CARDIAC REHAB | Age: 79
Setting detail: THERAPIES SERIES
Discharge: HOME | End: 2024-06-07
Payer: MEDICARE

## 2024-06-07 PROCEDURE — 93798 PHYS/QHP OP CAR RHAB W/ECG: CPT

## 2024-06-10 ENCOUNTER — HOSPITAL ENCOUNTER (OUTPATIENT)
Dept: CARDIAC REHAB | Age: 79
Setting detail: THERAPIES SERIES
Discharge: HOME | End: 2024-06-10
Payer: MEDICARE

## 2024-06-10 PROCEDURE — 93798 PHYS/QHP OP CAR RHAB W/ECG: CPT

## 2024-06-12 ENCOUNTER — HOSPITAL ENCOUNTER (OUTPATIENT)
Dept: CARDIAC REHAB | Age: 79
Setting detail: THERAPIES SERIES
Discharge: HOME | End: 2024-06-12
Payer: MEDICARE

## 2024-06-12 PROCEDURE — 93798 PHYS/QHP OP CAR RHAB W/ECG: CPT

## 2024-06-14 ENCOUNTER — HOSPITAL ENCOUNTER (OUTPATIENT)
Dept: CARDIAC REHAB | Age: 79
Setting detail: THERAPIES SERIES
Discharge: HOME | End: 2024-06-14
Payer: MEDICARE

## 2024-06-14 PROCEDURE — 93798 PHYS/QHP OP CAR RHAB W/ECG: CPT

## 2024-06-17 ENCOUNTER — HOSPITAL ENCOUNTER (OUTPATIENT)
Dept: CARDIAC REHAB | Age: 79
Setting detail: THERAPIES SERIES
Discharge: HOME | End: 2024-06-17
Payer: MEDICARE

## 2024-06-17 PROCEDURE — 93798 PHYS/QHP OP CAR RHAB W/ECG: CPT

## 2024-06-19 ENCOUNTER — HOSPITAL ENCOUNTER (OUTPATIENT)
Dept: CARDIAC REHAB | Age: 79
Setting detail: THERAPIES SERIES
Discharge: HOME | End: 2024-06-19
Payer: MEDICARE

## 2024-06-19 PROCEDURE — 93798 PHYS/QHP OP CAR RHAB W/ECG: CPT

## 2024-06-21 ENCOUNTER — HOSPITAL ENCOUNTER (OUTPATIENT)
Dept: CARDIAC REHAB | Age: 79
Setting detail: THERAPIES SERIES
Discharge: HOME | End: 2024-06-21
Payer: MEDICARE

## 2024-06-21 PROCEDURE — 93798 PHYS/QHP OP CAR RHAB W/ECG: CPT

## 2024-06-24 ENCOUNTER — HOSPITAL ENCOUNTER (OUTPATIENT)
Dept: CARDIAC REHAB | Age: 79
Setting detail: THERAPIES SERIES
Discharge: HOME | End: 2024-06-24
Payer: MEDICARE

## 2024-06-24 PROCEDURE — 93798 PHYS/QHP OP CAR RHAB W/ECG: CPT

## 2024-06-26 ENCOUNTER — HOSPITAL ENCOUNTER (OUTPATIENT)
Dept: CARDIAC REHAB | Age: 79
Setting detail: THERAPIES SERIES
Discharge: HOME | End: 2024-06-26
Payer: MEDICARE

## 2024-06-26 PROCEDURE — 93798 PHYS/QHP OP CAR RHAB W/ECG: CPT

## 2024-06-28 ENCOUNTER — HOSPITAL ENCOUNTER (OUTPATIENT)
Dept: CARDIAC REHAB | Age: 79
Setting detail: THERAPIES SERIES
Discharge: HOME | End: 2024-06-28
Payer: MEDICARE

## 2024-06-28 PROCEDURE — 93798 PHYS/QHP OP CAR RHAB W/ECG: CPT

## 2024-07-01 ENCOUNTER — HOSPITAL ENCOUNTER (OUTPATIENT)
Dept: CARDIAC REHAB | Age: 79
Setting detail: THERAPIES SERIES
Discharge: HOME | End: 2024-07-01
Payer: MEDICARE

## 2024-07-01 PROCEDURE — 93798 PHYS/QHP OP CAR RHAB W/ECG: CPT

## 2024-07-03 ENCOUNTER — HOSPITAL ENCOUNTER (OUTPATIENT)
Dept: CARDIAC REHAB | Age: 79
Setting detail: THERAPIES SERIES
Discharge: HOME | End: 2024-07-03
Payer: MEDICARE

## 2024-07-03 PROCEDURE — 93798 PHYS/QHP OP CAR RHAB W/ECG: CPT

## 2024-07-05 ENCOUNTER — APPOINTMENT (OUTPATIENT)
Dept: CARDIAC REHAB | Age: 79
End: 2024-07-05
Payer: MEDICARE

## 2024-07-08 ENCOUNTER — HOSPITAL ENCOUNTER (OUTPATIENT)
Dept: CARDIAC REHAB | Age: 79
Setting detail: THERAPIES SERIES
Discharge: HOME | End: 2024-07-08
Payer: MEDICARE

## 2024-07-10 ENCOUNTER — APPOINTMENT (OUTPATIENT)
Dept: CARDIAC REHAB | Age: 79
End: 2024-07-10
Payer: MEDICARE

## 2024-07-12 ENCOUNTER — APPOINTMENT (OUTPATIENT)
Dept: CARDIAC REHAB | Age: 79
End: 2024-07-12
Payer: MEDICARE

## 2024-07-15 ENCOUNTER — APPOINTMENT (OUTPATIENT)
Dept: CARDIAC REHAB | Age: 79
End: 2024-07-15
Payer: MEDICARE

## 2024-07-17 ENCOUNTER — APPOINTMENT (OUTPATIENT)
Dept: CARDIAC REHAB | Age: 79
End: 2024-07-17
Payer: MEDICARE

## 2024-07-19 ENCOUNTER — APPOINTMENT (OUTPATIENT)
Dept: CARDIAC REHAB | Age: 79
End: 2024-07-19
Payer: MEDICARE

## 2024-07-22 ENCOUNTER — APPOINTMENT (OUTPATIENT)
Dept: CARDIAC REHAB | Age: 79
End: 2024-07-22
Payer: MEDICARE

## 2024-07-24 ENCOUNTER — APPOINTMENT (OUTPATIENT)
Dept: CARDIAC REHAB | Age: 79
End: 2024-07-24
Payer: MEDICARE

## 2024-07-26 ENCOUNTER — APPOINTMENT (OUTPATIENT)
Dept: CARDIAC REHAB | Age: 79
End: 2024-07-26
Payer: MEDICARE

## 2024-07-29 ENCOUNTER — APPOINTMENT (OUTPATIENT)
Dept: CARDIAC REHAB | Age: 79
End: 2024-07-29
Payer: MEDICARE

## 2024-07-31 ENCOUNTER — APPOINTMENT (OUTPATIENT)
Dept: CARDIAC REHAB | Age: 79
End: 2024-07-31
Payer: MEDICARE

## 2025-07-16 ENCOUNTER — APPOINTMENT (OUTPATIENT)
Dept: CT IMAGING | Age: 80
End: 2025-07-16
Payer: MEDICARE

## 2025-07-16 ENCOUNTER — HOSPITAL ENCOUNTER (EMERGENCY)
Age: 80
Discharge: HOME | End: 2025-07-16
Payer: MEDICARE

## 2025-07-16 VITALS
WEIGHT: 112 LBS | BODY MASS INDEX: 18.66 KG/M2 | SYSTOLIC BLOOD PRESSURE: 113 MMHG | TEMPERATURE: 98.8 F | HEART RATE: 74 BPM | DIASTOLIC BLOOD PRESSURE: 62 MMHG | OXYGEN SATURATION: 95 % | RESPIRATION RATE: 18 BRPM | HEIGHT: 65 IN
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
## 2025-07-16 DIAGNOSIS — S09.90XA CLOSED HEAD INJURY, INITIAL ENCOUNTER: Primary | ICD-10-CM

## 2025-07-16 PROCEDURE — 99284 EMERGENCY DEPT VISIT MOD MDM: CPT

## 2025-07-16 PROCEDURE — 70450 CT HEAD/BRAIN W/O DYE: CPT

## 2025-07-16 ASSESSMENT — PAIN SCALES - GENERAL: PAINLEVEL_OUTOF10: 2

## 2025-07-16 ASSESSMENT — PAIN - FUNCTIONAL ASSESSMENT: PAIN_FUNCTIONAL_ASSESSMENT: 0-10

## 2025-07-16 ASSESSMENT — LIFESTYLE VARIABLES
HOW OFTEN DO YOU HAVE A DRINK CONTAINING ALCOHOL: MONTHLY OR LESS
HOW MANY STANDARD DRINKS CONTAINING ALCOHOL DO YOU HAVE ON A TYPICAL DAY: 1 OR 2
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
## 2025-07-28 ENCOUNTER — TRANSCRIBE ORDERS (OUTPATIENT)
Dept: ADMINISTRATIVE | Age: 80
End: 2025-07-28

## 2025-07-28 DIAGNOSIS — R55 SYNCOPE, UNSPECIFIED SYNCOPE TYPE: Primary | ICD-10-CM

## 2025-08-02 ENCOUNTER — OFFICE VISIT (OUTPATIENT)
Age: 80
End: 2025-08-02

## 2025-08-02 VITALS
OXYGEN SATURATION: 96 % | HEART RATE: 73 BPM | WEIGHT: 112 LBS | BODY MASS INDEX: 18.66 KG/M2 | DIASTOLIC BLOOD PRESSURE: 60 MMHG | SYSTOLIC BLOOD PRESSURE: 100 MMHG | TEMPERATURE: 97.2 F | HEIGHT: 65 IN

## 2025-08-02 DIAGNOSIS — R05.1 ACUTE COUGH: ICD-10-CM

## 2025-08-02 DIAGNOSIS — H66.001 NON-RECURRENT ACUTE SUPPURATIVE OTITIS MEDIA OF RIGHT EAR WITHOUT SPONTANEOUS RUPTURE OF TYMPANIC MEMBRANE: Primary | ICD-10-CM

## 2025-08-02 DIAGNOSIS — J02.9 SORE THROAT: ICD-10-CM

## 2025-08-02 DIAGNOSIS — R09.89 CHEST CONGESTION: ICD-10-CM

## 2025-08-02 LAB
Lab: NORMAL
PERFORMING INSTRUMENT: NORMAL
QC PASS/FAIL: NORMAL
SARS-COV-2, POC: NORMAL

## 2025-08-02 RX ORDER — ATORVASTATIN CALCIUM 80 MG/1
TABLET, FILM COATED ORAL
COMMUNITY
Start: 2025-06-23

## 2025-08-02 RX ORDER — NITROGLYCERIN 0.4 MG/1
0.4 TABLET SUBLINGUAL EVERY 5 MIN PRN
COMMUNITY
Start: 2025-05-20

## 2025-08-02 RX ORDER — LEVOTHYROXINE SODIUM 75 UG/1
TABLET ORAL
COMMUNITY
Start: 2025-05-19

## 2025-08-02 RX ORDER — POLYETHYLENE GLYCOL 3350 17 G/17G
17 POWDER, FOR SOLUTION ORAL DAILY PRN
COMMUNITY

## 2025-08-02 RX ORDER — METOPROLOL SUCCINATE 25 MG/1
25 TABLET, EXTENDED RELEASE ORAL DAILY
COMMUNITY
Start: 2024-10-02

## 2025-08-02 RX ORDER — CEFDINIR 300 MG/1
300 CAPSULE ORAL 2 TIMES DAILY
Qty: 14 CAPSULE | Refills: 0 | Status: SHIPPED | OUTPATIENT
Start: 2025-08-02 | End: 2025-08-09

## 2025-08-02 RX ORDER — OMEPRAZOLE 20 MG/1
20 CAPSULE, DELAYED RELEASE ORAL DAILY
COMMUNITY
Start: 2025-06-23

## 2025-08-02 RX ORDER — MULTIVITAMIN,THERAPEUTIC
1 TABLET ORAL DAILY
COMMUNITY

## 2025-08-02 RX ORDER — AZELASTINE 1 MG/ML
2 SPRAY, METERED NASAL 2 TIMES DAILY
COMMUNITY
Start: 2025-05-20

## 2025-08-02 ASSESSMENT — ENCOUNTER SYMPTOMS
COUGH: 1
RHINORRHEA: 1

## 2025-08-02 NOTE — PROGRESS NOTES
Lynette Ornelas (: 1945) is a 79 y.o. female, Established patient, here for evaluation of the following chief complaint(s):  Ear Pain (Left side), Pharyngitis, Chills, and Congestion      ASSESSMENT/PLAN:    ICD-10-CM    1. Non-recurrent acute suppurative otitis media of right ear without spontaneous rupture of tympanic membrane  H66.001 cefdinir (OMNICEF) 300 MG capsule      2. Chest congestion  R09.89 POCT COVID-19, Antigen      3. Acute cough  R05.1       4. Sore throat  J02.9           - Otitis Media of the Right Ear:   Due to exam concerning for a bulging, erythematous TM, with purulent appearing effusion, concern for otitis media noted of the right ear complicating an underlying viral URI.   Low concern for otitis externa, TM perforation, cerumen impaction, foreign body within the ear canal, mastoiditis, respiratory distress, pneumonia, bronchitis, and PE.  Cefdinir is prescribed for antibiotic treatment of the ear infection  Recommended OTC medication and home remedy treatments for symptomatic relief  Strict ED follow up instructions provided.  Discussed PCP follow up for persisting or worsening symptoms, or to return to the clinic if unable to obtain PCP follow up for worsening symptoms.  The patient tolerated their visit well. The patient and daughter were informed of the results of any tests. A time was given to answer questions and a plan was established, proposed, and was agreed upon. Reviewed AVS with treatment instructions and answered questions - patient and daughter acknowledges understanding and agreement with the discussed treatment plan and AVS instructions.              SUBJECTIVE/OBJECTIVE:  HPI:   79 y.o. female presents for complaint of right ear pain, sore throat, congestion x 2 days.    Admits symptoms started yesterday when she woke up. She had the chills and her teeth were chattering together. As the day went on, her right ear started to hurt. This morning, ear pain had increased

## 2025-08-02 NOTE — PATIENT INSTRUCTIONS
Cefdinir is prescribed for antibiotic treatment of the ear infection  Take the antibiotics until completed, do not stop unless otherwise directed by a healthcare provider.  Recommend daily yogurt or other probiotics while on antibiotics.  Recommend OTC treatment for symptoms:  ibuprofen (Advil, Motrin) and acetaminophen (Tylenol) for fevers and pain relief.  decongestants (specifically pseudoephedrine) <avoid if you have a history of high blood pressure or heart conditions>, along with antihistamines (Claritin, Zyrtec, Allegra, or Xyzal) and nasal steroid sprays (Flonase) to help with nasal congestion and runny nose.  throat sprays (Cepacol, chloraseptic) for throat pain.  dextromethorphan (Robitussin, Delsym), throat lozenges, and increased water intake for cough.  honey (1-2 teaspoons every hour) for relief of throat irritation and coughing fits.  warm teas, humidifiers, nasal lavages, and sleeping in an inclined position are also helpful options that can lessen symptoms.  Recommend warm compresses over the symptomatic ear(s) for 10-15 minutes, or a hot shower, followed by 1-2 minutes of massaging the area behind your ears and down the jaw-line to help with the ear congestion.  Follow up with your PCP within 5 days or, if unable, you can return to the clinic if symptoms persist beyond 5 days or if symptoms worsen.  If you develop worsening fevers, dizziness, severe ear pain, severe headaches, ear swelling, pain/swelling noted to the bone behind the ears, neck pain/stiffness, shortness of breath, increased work of breathing, or chest pain, call 911, or follow up immediately with the nearest Emergency Department for further evaluation.    New Prescriptions    No medications on file

## 2025-08-15 ENCOUNTER — HOSPITAL ENCOUNTER (OUTPATIENT)
Age: 80
Setting detail: SPECIMEN
Discharge: HOME | End: 2025-08-15
Payer: MEDICARE

## 2025-08-15 LAB
ALBUMIN SERPL-MCNC: 3.9 G/DL (ref 3.4–5)
ALBUMIN/GLOB SERPL: 2.2 {RATIO} (ref 1.1–2.2)
ALP SERPL-CCNC: 83 U/L (ref 40–129)
ALT SERPL-CCNC: 24 U/L (ref 10–40)
ANION GAP SERPL CALCULATED.3IONS-SCNC: 9 MMOL/L (ref 3–16)
AST SERPL-CCNC: 21 U/L (ref 15–37)
BILIRUB SERPL-MCNC: <0.2 MG/DL (ref 0–1)
BUN SERPL-MCNC: 10 MG/DL (ref 7–20)
CALCIUM SERPL-MCNC: 9.8 MG/DL (ref 8.3–10.6)
CHLORIDE SERPL-SCNC: 101 MMOL/L (ref 99–110)
CO2 SERPL-SCNC: 27 MMOL/L (ref 21–32)
CREAT SERPL-MCNC: 0.8 MG/DL (ref 0.6–1.2)
GFR SERPLBLD CREATININE-BSD FMLA CKD-EPI: 75 ML/MIN/{1.73_M2}
GLUCOSE SERPL-MCNC: 111 MG/DL (ref 70–99)
MAGNESIUM SERPL-MCNC: 1.74 MG/DL (ref 1.8–2.4)
PHOSPHATE SERPL-MCNC: 2.2 MG/DL (ref 2.5–4.9)
POTASSIUM SERPL-SCNC: 4.1 MMOL/L (ref 3.5–5.1)
PROT SERPL-MCNC: 5.7 G/DL (ref 6.4–8.2)
SODIUM SERPL-SCNC: 137 MMOL/L (ref 136–145)

## 2025-08-15 PROCEDURE — 83735 ASSAY OF MAGNESIUM: CPT

## 2025-08-15 PROCEDURE — 80053 COMPREHEN METABOLIC PANEL: CPT

## 2025-08-15 PROCEDURE — 84100 ASSAY OF PHOSPHORUS: CPT

## 2025-08-15 PROCEDURE — 36415 COLL VENOUS BLD VENIPUNCTURE: CPT

## 2025-08-26 ENCOUNTER — HOSPITAL ENCOUNTER (OUTPATIENT)
Dept: NEUROLOGY | Age: 80
Discharge: HOME | End: 2025-08-26
Payer: MEDICARE

## 2025-08-26 DIAGNOSIS — R55 SYNCOPE, UNSPECIFIED SYNCOPE TYPE: ICD-10-CM

## 2025-08-26 PROCEDURE — 95816 EEG AWAKE AND DROWSY: CPT

## 2025-08-26 PROCEDURE — 95816 EEG AWAKE AND DROWSY: CPT | Performed by: NEUROMUSCULOSKELETAL MEDICINE & OMM

## 2025-09-03 ENCOUNTER — APPOINTMENT (OUTPATIENT)
Dept: CT IMAGING | Age: 80
End: 2025-09-03
Payer: MEDICARE

## 2025-09-03 ENCOUNTER — APPOINTMENT (OUTPATIENT)
Dept: GENERAL RADIOLOGY | Age: 80
End: 2025-09-03
Payer: MEDICARE

## 2025-09-03 ENCOUNTER — HOSPITAL ENCOUNTER (INPATIENT)
Age: 80
LOS: 1 days | Discharge: HOME | End: 2025-09-05
Attending: EMERGENCY MEDICINE | Admitting: STUDENT IN AN ORGANIZED HEALTH CARE EDUCATION/TRAINING PROGRAM
Payer: MEDICARE

## 2025-09-03 DIAGNOSIS — R91.1 LUNG NODULE: ICD-10-CM

## 2025-09-03 DIAGNOSIS — E83.39 HYPOPHOSPHATEMIA: Primary | ICD-10-CM

## 2025-09-03 LAB
ALBUMIN SERPL-MCNC: 4.6 G/DL (ref 3.4–5)
ALBUMIN/GLOB SERPL: 1.9 {RATIO} (ref 1.1–2.2)
ALP SERPL-CCNC: 90 U/L (ref 40–129)
ALT SERPL-CCNC: 32 U/L (ref 10–40)
ANION GAP SERPL CALCULATED.3IONS-SCNC: 12 MMOL/L (ref 3–16)
AST SERPL-CCNC: 29 U/L (ref 15–37)
BASOPHILS # BLD: 0 K/UL (ref 0–0.2)
BASOPHILS NFR BLD: 0.4 %
BILIRUB SERPL-MCNC: 0.3 MG/DL (ref 0–1)
BUN SERPL-MCNC: 22 MG/DL (ref 7–20)
CALCIUM SERPL-MCNC: 10.6 MG/DL (ref 8.3–10.6)
CHLORIDE SERPL-SCNC: 96 MMOL/L (ref 99–110)
CO2 SERPL-SCNC: 27 MMOL/L (ref 21–32)
CREAT SERPL-MCNC: 0.8 MG/DL (ref 0.6–1.2)
DEPRECATED RDW RBC AUTO: 15.3 % (ref 12.4–15.4)
EOSINOPHIL # BLD: 0.3 K/UL (ref 0–0.6)
EOSINOPHIL NFR BLD: 3.2 %
GFR SERPLBLD CREATININE-BSD FMLA CKD-EPI: 75 ML/MIN/{1.73_M2}
GLUCOSE SERPL-MCNC: 142 MG/DL (ref 70–99)
HCT VFR BLD AUTO: 35.1 % (ref 36–48)
HGB BLD-MCNC: 11.9 G/DL (ref 12–16)
LYMPHOCYTES # BLD: 2 K/UL (ref 1–5.1)
LYMPHOCYTES NFR BLD: 20.3 %
MCH RBC QN AUTO: 31.2 PG (ref 26–34)
MCHC RBC AUTO-ENTMCNC: 33.9 G/DL (ref 31–36)
MCV RBC AUTO: 92.2 FL (ref 80–100)
MONOCYTES # BLD: 0.8 K/UL (ref 0–1.3)
MONOCYTES NFR BLD: 7.8 %
NEUTROPHILS # BLD: 6.7 K/UL (ref 1.7–7.7)
NEUTROPHILS NFR BLD: 68.3 %
PHOSPHATE SERPL-MCNC: <0.4 MG/DL (ref 2.5–4.9)
PLATELET # BLD AUTO: 249 K/UL (ref 135–450)
PMV BLD AUTO: 8.8 FL (ref 5–10.5)
POTASSIUM SERPL-SCNC: 4.1 MMOL/L (ref 3.5–5.1)
PROT SERPL-MCNC: 7 G/DL (ref 6.4–8.2)
RBC # BLD AUTO: 3.8 M/UL (ref 4–5.2)
SODIUM SERPL-SCNC: 135 MMOL/L (ref 136–145)
TROPONIN, HIGH SENSITIVITY: 11 NG/L (ref 0–14)
WBC # BLD AUTO: 9.7 K/UL (ref 4–11)

## 2025-09-03 PROCEDURE — 84484 ASSAY OF TROPONIN QUANT: CPT

## 2025-09-03 PROCEDURE — 2500000003 HC RX 250 WO HCPCS: Performed by: EMERGENCY MEDICINE

## 2025-09-03 PROCEDURE — 96365 THER/PROPH/DIAG IV INF INIT: CPT

## 2025-09-03 PROCEDURE — 99285 EMERGENCY DEPT VISIT HI MDM: CPT

## 2025-09-03 PROCEDURE — 2580000003 HC RX 258: Performed by: EMERGENCY MEDICINE

## 2025-09-03 PROCEDURE — 85025 COMPLETE CBC W/AUTO DIFF WBC: CPT

## 2025-09-03 PROCEDURE — 93005 ELECTROCARDIOGRAM TRACING: CPT | Performed by: EMERGENCY MEDICINE

## 2025-09-03 PROCEDURE — 71250 CT THORAX DX C-: CPT

## 2025-09-03 PROCEDURE — 71045 X-RAY EXAM CHEST 1 VIEW: CPT

## 2025-09-03 PROCEDURE — 36415 COLL VENOUS BLD VENIPUNCTURE: CPT

## 2025-09-03 PROCEDURE — 84100 ASSAY OF PHOSPHORUS: CPT

## 2025-09-03 PROCEDURE — 80053 COMPREHEN METABOLIC PANEL: CPT

## 2025-09-03 RX ADMIN — SODIUM PHOSPHATE, MONOBASIC, MONOHYDRATE AND SODIUM PHOSPHATE, DIBASIC, ANHYDROUS 17.13 MMOL: 142; 276 INJECTION, SOLUTION INTRAVENOUS at 23:29

## 2025-09-03 ASSESSMENT — LIFESTYLE VARIABLES
HOW OFTEN DO YOU HAVE A DRINK CONTAINING ALCOHOL: NEVER
HOW MANY STANDARD DRINKS CONTAINING ALCOHOL DO YOU HAVE ON A TYPICAL DAY: PATIENT DOES NOT DRINK

## 2025-09-03 ASSESSMENT — PAIN SCALES - GENERAL: PAINLEVEL_OUTOF10: 0

## 2025-09-03 ASSESSMENT — PAIN - FUNCTIONAL ASSESSMENT: PAIN_FUNCTIONAL_ASSESSMENT: 0-10

## 2025-09-04 PROBLEM — J18.9 PNEUMONIA DUE TO ORGANISM: Status: ACTIVE | Noted: 2025-09-04

## 2025-09-04 LAB
25(OH)D3 SERPL-MCNC: 59.7 NG/ML
ANION GAP SERPL CALCULATED.3IONS-SCNC: 9 MMOL/L (ref 3–16)
BACTERIA URNS QL MICRO: ABNORMAL /HPF
BASOPHILS # BLD: 0 K/UL (ref 0–0.2)
BASOPHILS NFR BLD: 0.7 %
BILIRUB UR QL STRIP.AUTO: NEGATIVE
BUN SERPL-MCNC: 19 MG/DL (ref 7–20)
CALCIUM SERPL-MCNC: 9.1 MG/DL (ref 8.3–10.6)
CHLORIDE SERPL-SCNC: 102 MMOL/L (ref 99–110)
CLARITY UR: CLEAR
CO2 SERPL-SCNC: 28 MMOL/L (ref 21–32)
COLOR UR: YELLOW
CREAT SERPL-MCNC: 0.7 MG/DL (ref 0.6–1.2)
DEPRECATED RDW RBC AUTO: 15.4 % (ref 12.4–15.4)
EKG ATRIAL RATE: 72 BPM
EKG DIAGNOSIS: NORMAL
EKG P AXIS: 73 DEGREES
EKG P-R INTERVAL: 166 MS
EKG Q-T INTERVAL: 416 MS
EKG QRS DURATION: 80 MS
EKG QTC CALCULATION (BAZETT): 455 MS
EKG R AXIS: 54 DEGREES
EKG T AXIS: 60 DEGREES
EKG VENTRICULAR RATE: 72 BPM
EOSINOPHIL # BLD: 0.2 K/UL (ref 0–0.6)
EOSINOPHIL NFR BLD: 3.1 %
EST. AVERAGE GLUCOSE BLD GHB EST-MCNC: 116.9 MG/DL
FOLATE SERPL-MCNC: 24.4 NG/ML (ref 4.78–24.2)
GFR SERPLBLD CREATININE-BSD FMLA CKD-EPI: 88 ML/MIN/{1.73_M2}
GLUCOSE BLD-MCNC: 109 MG/DL (ref 70–99)
GLUCOSE BLD-MCNC: 113 MG/DL (ref 70–99)
GLUCOSE BLD-MCNC: 131 MG/DL (ref 70–99)
GLUCOSE BLD-MCNC: 139 MG/DL (ref 70–99)
GLUCOSE BLD-MCNC: 142 MG/DL (ref 70–99)
GLUCOSE SERPL-MCNC: 108 MG/DL (ref 70–99)
GLUCOSE UR STRIP.AUTO-MCNC: NEGATIVE MG/DL
HBA1C MFR BLD: 5.7 %
HCT VFR BLD AUTO: 31.4 % (ref 36–48)
HGB BLD-MCNC: 10.7 G/DL (ref 12–16)
HGB UR QL STRIP.AUTO: NEGATIVE
KETONES UR STRIP.AUTO-MCNC: NEGATIVE MG/DL
LEUKOCYTE ESTERASE UR QL STRIP.AUTO: ABNORMAL
LYMPHOCYTES # BLD: 2.1 K/UL (ref 1–5.1)
LYMPHOCYTES NFR BLD: 28.6 %
MAGNESIUM SERPL-MCNC: 1.75 MG/DL (ref 1.8–2.4)
MCH RBC QN AUTO: 31.5 PG (ref 26–34)
MCHC RBC AUTO-ENTMCNC: 34.1 G/DL (ref 31–36)
MCV RBC AUTO: 92.2 FL (ref 80–100)
MONOCYTES # BLD: 0.6 K/UL (ref 0–1.3)
MONOCYTES NFR BLD: 8.8 %
NEUTROPHILS # BLD: 4.3 K/UL (ref 1.7–7.7)
NEUTROPHILS NFR BLD: 58.8 %
NITRITE UR QL STRIP.AUTO: NEGATIVE
PERFORMED ON: ABNORMAL
PH UR STRIP.AUTO: 7.5 [PH] (ref 5–8)
PLATELET # BLD AUTO: 210 K/UL (ref 135–450)
PMV BLD AUTO: 9 FL (ref 5–10.5)
POTASSIUM SERPL-SCNC: 3.3 MMOL/L (ref 3.5–5.1)
PROT UR STRIP.AUTO-MCNC: NEGATIVE MG/DL
RBC # BLD AUTO: 3.41 M/UL (ref 4–5.2)
RBC #/AREA URNS HPF: ABNORMAL /HPF (ref 0–4)
SODIUM SERPL-SCNC: 139 MMOL/L (ref 136–145)
SP GR UR STRIP.AUTO: 1.01 (ref 1–1.03)
TROPONIN, HIGH SENSITIVITY: 12 NG/L (ref 0–14)
UA DIPSTICK W REFLEX MICRO PNL UR: YES
URN SPEC COLLECT METH UR: ABNORMAL
UROBILINOGEN UR STRIP-ACNC: 0.2 E.U./DL
VIT B12 SERPL-MCNC: 420 PG/ML (ref 211–911)
WBC # BLD AUTO: 7.4 K/UL (ref 4–11)
WBC #/AREA URNS HPF: ABNORMAL /HPF (ref 0–5)

## 2025-09-04 PROCEDURE — 83036 HEMOGLOBIN GLYCOSYLATED A1C: CPT

## 2025-09-04 PROCEDURE — 2580000003 HC RX 258: Performed by: STUDENT IN AN ORGANIZED HEALTH CARE EDUCATION/TRAINING PROGRAM

## 2025-09-04 PROCEDURE — 36415 COLL VENOUS BLD VENIPUNCTURE: CPT

## 2025-09-04 PROCEDURE — 6370000000 HC RX 637 (ALT 250 FOR IP): Performed by: EMERGENCY MEDICINE

## 2025-09-04 PROCEDURE — 6360000002 HC RX W HCPCS: Performed by: EMERGENCY MEDICINE

## 2025-09-04 PROCEDURE — 85025 COMPLETE CBC W/AUTO DIFF WBC: CPT

## 2025-09-04 PROCEDURE — 2500000003 HC RX 250 WO HCPCS: Performed by: NURSE PRACTITIONER

## 2025-09-04 PROCEDURE — 6370000000 HC RX 637 (ALT 250 FOR IP): Performed by: STUDENT IN AN ORGANIZED HEALTH CARE EDUCATION/TRAINING PROGRAM

## 2025-09-04 PROCEDURE — 87040 BLOOD CULTURE FOR BACTERIA: CPT

## 2025-09-04 PROCEDURE — 2580000003 HC RX 258: Performed by: EMERGENCY MEDICINE

## 2025-09-04 PROCEDURE — 82607 VITAMIN B-12: CPT

## 2025-09-04 PROCEDURE — 2500000003 HC RX 250 WO HCPCS: Performed by: STUDENT IN AN ORGANIZED HEALTH CARE EDUCATION/TRAINING PROGRAM

## 2025-09-04 PROCEDURE — 80048 BASIC METABOLIC PNL TOTAL CA: CPT

## 2025-09-04 PROCEDURE — 6360000002 HC RX W HCPCS: Performed by: NURSE PRACTITIONER

## 2025-09-04 PROCEDURE — 2500000003 HC RX 250 WO HCPCS: Performed by: EMERGENCY MEDICINE

## 2025-09-04 PROCEDURE — 83735 ASSAY OF MAGNESIUM: CPT

## 2025-09-04 PROCEDURE — 81001 URINALYSIS AUTO W/SCOPE: CPT

## 2025-09-04 PROCEDURE — 6370000000 HC RX 637 (ALT 250 FOR IP): Performed by: NURSE PRACTITIONER

## 2025-09-04 PROCEDURE — 2580000003 HC RX 258: Performed by: NURSE PRACTITIONER

## 2025-09-04 PROCEDURE — 93010 ELECTROCARDIOGRAM REPORT: CPT | Performed by: INTERNAL MEDICINE

## 2025-09-04 PROCEDURE — 84484 ASSAY OF TROPONIN QUANT: CPT

## 2025-09-04 PROCEDURE — 6360000002 HC RX W HCPCS: Performed by: STUDENT IN AN ORGANIZED HEALTH CARE EDUCATION/TRAINING PROGRAM

## 2025-09-04 PROCEDURE — 82306 VITAMIN D 25 HYDROXY: CPT

## 2025-09-04 PROCEDURE — 82746 ASSAY OF FOLIC ACID SERUM: CPT

## 2025-09-04 PROCEDURE — 1200000000 HC SEMI PRIVATE

## 2025-09-04 RX ORDER — SODIUM CHLORIDE 9 MG/ML
INJECTION, SOLUTION INTRAVENOUS PRN
Status: DISCONTINUED | OUTPATIENT
Start: 2025-09-04 | End: 2025-09-05 | Stop reason: HOSPADM

## 2025-09-04 RX ORDER — DEXTROSE MONOHYDRATE 100 MG/ML
INJECTION, SOLUTION INTRAVENOUS CONTINUOUS PRN
Status: DISCONTINUED | OUTPATIENT
Start: 2025-09-04 | End: 2025-09-05 | Stop reason: HOSPADM

## 2025-09-04 RX ORDER — ATORVASTATIN CALCIUM 40 MG/1
40 TABLET, FILM COATED ORAL DAILY
Status: DISCONTINUED | OUTPATIENT
Start: 2025-09-04 | End: 2025-09-05 | Stop reason: HOSPADM

## 2025-09-04 RX ORDER — ONDANSETRON 4 MG/1
4 TABLET, ORALLY DISINTEGRATING ORAL EVERY 8 HOURS PRN
Status: DISCONTINUED | OUTPATIENT
Start: 2025-09-04 | End: 2025-09-05 | Stop reason: HOSPADM

## 2025-09-04 RX ORDER — INSULIN LISPRO 100 [IU]/ML
0-4 INJECTION, SOLUTION INTRAVENOUS; SUBCUTANEOUS
Status: DISCONTINUED | OUTPATIENT
Start: 2025-09-04 | End: 2025-09-05 | Stop reason: HOSPADM

## 2025-09-04 RX ORDER — DIPHENHYDRAMINE HCL 25 MG
25 TABLET ORAL NIGHTLY PRN
COMMUNITY

## 2025-09-04 RX ORDER — ACETAMINOPHEN 325 MG/1
650 TABLET ORAL EVERY 6 HOURS PRN
Status: DISCONTINUED | OUTPATIENT
Start: 2025-09-04 | End: 2025-09-05 | Stop reason: HOSPADM

## 2025-09-04 RX ORDER — PRIMIDONE 50 MG/1
50 TABLET ORAL 2 TIMES DAILY
Status: DISCONTINUED | OUTPATIENT
Start: 2025-09-04 | End: 2025-09-05 | Stop reason: HOSPADM

## 2025-09-04 RX ORDER — METOPROLOL SUCCINATE 25 MG/1
25 TABLET, EXTENDED RELEASE ORAL DAILY
Status: DISCONTINUED | OUTPATIENT
Start: 2025-09-04 | End: 2025-09-05 | Stop reason: HOSPADM

## 2025-09-04 RX ORDER — PANTOPRAZOLE SODIUM 40 MG/1
40 TABLET, DELAYED RELEASE ORAL
Status: DISCONTINUED | OUTPATIENT
Start: 2025-09-04 | End: 2025-09-05 | Stop reason: HOSPADM

## 2025-09-04 RX ORDER — TRAZODONE HYDROCHLORIDE 50 MG/1
50 TABLET ORAL NIGHTLY
Status: DISCONTINUED | OUTPATIENT
Start: 2025-09-04 | End: 2025-09-05 | Stop reason: HOSPADM

## 2025-09-04 RX ORDER — SODIUM CHLORIDE 0.9 % (FLUSH) 0.9 %
5-40 SYRINGE (ML) INJECTION PRN
Status: DISCONTINUED | OUTPATIENT
Start: 2025-09-04 | End: 2025-09-05 | Stop reason: HOSPADM

## 2025-09-04 RX ORDER — ACETAMINOPHEN 650 MG/1
650 SUPPOSITORY RECTAL EVERY 6 HOURS PRN
Status: DISCONTINUED | OUTPATIENT
Start: 2025-09-04 | End: 2025-09-05 | Stop reason: HOSPADM

## 2025-09-04 RX ORDER — ALBUTEROL SULFATE 0.83 MG/ML
2.5 SOLUTION RESPIRATORY (INHALATION) EVERY 4 HOURS PRN
Status: DISCONTINUED | OUTPATIENT
Start: 2025-09-04 | End: 2025-09-05 | Stop reason: HOSPADM

## 2025-09-04 RX ORDER — MAGNESIUM SULFATE IN WATER 40 MG/ML
2000 INJECTION, SOLUTION INTRAVENOUS ONCE
Status: COMPLETED | OUTPATIENT
Start: 2025-09-04 | End: 2025-09-04

## 2025-09-04 RX ORDER — LEVOTHYROXINE SODIUM 100 UG/1
100 TABLET ORAL DAILY
Status: DISCONTINUED | OUTPATIENT
Start: 2025-09-04 | End: 2025-09-05 | Stop reason: HOSPADM

## 2025-09-04 RX ORDER — ONDANSETRON 2 MG/ML
4 INJECTION INTRAMUSCULAR; INTRAVENOUS EVERY 6 HOURS PRN
Status: DISCONTINUED | OUTPATIENT
Start: 2025-09-04 | End: 2025-09-05 | Stop reason: HOSPADM

## 2025-09-04 RX ORDER — SODIUM CHLORIDE 9 MG/ML
INJECTION, SOLUTION INTRAVENOUS CONTINUOUS
Status: ACTIVE | OUTPATIENT
Start: 2025-09-04 | End: 2025-09-04

## 2025-09-04 RX ORDER — ENOXAPARIN SODIUM 100 MG/ML
40 INJECTION SUBCUTANEOUS DAILY
Status: DISCONTINUED | OUTPATIENT
Start: 2025-09-04 | End: 2025-09-05 | Stop reason: HOSPADM

## 2025-09-04 RX ORDER — POLYETHYLENE GLYCOL 3350 17 G/17G
17 POWDER, FOR SOLUTION ORAL DAILY PRN
Status: DISCONTINUED | OUTPATIENT
Start: 2025-09-04 | End: 2025-09-05 | Stop reason: HOSPADM

## 2025-09-04 RX ORDER — SODIUM CHLORIDE 0.9 % (FLUSH) 0.9 %
5-40 SYRINGE (ML) INJECTION EVERY 12 HOURS SCHEDULED
Status: DISCONTINUED | OUTPATIENT
Start: 2025-09-04 | End: 2025-09-05 | Stop reason: HOSPADM

## 2025-09-04 RX ORDER — ACETAMINOPHEN 500 MG
1000 TABLET ORAL ONCE
Status: COMPLETED | OUTPATIENT
Start: 2025-09-04 | End: 2025-09-04

## 2025-09-04 RX ORDER — GLUCAGON 1 MG/ML
1 KIT INJECTION PRN
Status: DISCONTINUED | OUTPATIENT
Start: 2025-09-04 | End: 2025-09-05 | Stop reason: HOSPADM

## 2025-09-04 RX ORDER — ALBUTEROL SULFATE 0.83 MG/ML
2.5 SOLUTION RESPIRATORY (INHALATION)
Status: DISCONTINUED | OUTPATIENT
Start: 2025-09-04 | End: 2025-09-04

## 2025-09-04 RX ORDER — POTASSIUM CHLORIDE 1500 MG/1
40 TABLET, EXTENDED RELEASE ORAL ONCE
Status: COMPLETED | OUTPATIENT
Start: 2025-09-04 | End: 2025-09-04

## 2025-09-04 RX ADMIN — SODIUM CHLORIDE: 0.9 INJECTION, SOLUTION INTRAVENOUS at 02:56

## 2025-09-04 RX ADMIN — AZITHROMYCIN MONOHYDRATE 500 MG: 500 INJECTION, POWDER, LYOPHILIZED, FOR SOLUTION INTRAVENOUS at 01:54

## 2025-09-04 RX ADMIN — SODIUM CHLORIDE, PRESERVATIVE FREE 10 ML: 5 INJECTION INTRAVENOUS at 21:15

## 2025-09-04 RX ADMIN — METOPROLOL SUCCINATE 25 MG: 25 TABLET, EXTENDED RELEASE ORAL at 08:24

## 2025-09-04 RX ADMIN — SODIUM PHOSPHATE, MONOBASIC, MONOHYDRATE AND SODIUM PHOSPHATE, DIBASIC, ANHYDROUS 30 MMOL: 142; 276 INJECTION, SOLUTION INTRAVENOUS at 09:55

## 2025-09-04 RX ADMIN — ATORVASTATIN CALCIUM 40 MG: 40 TABLET, FILM COATED ORAL at 08:24

## 2025-09-04 RX ADMIN — POTASSIUM CHLORIDE 40 MEQ: 1500 TABLET, EXTENDED RELEASE ORAL at 09:47

## 2025-09-04 RX ADMIN — ENOXAPARIN SODIUM 40 MG: 100 INJECTION SUBCUTANEOUS at 08:25

## 2025-09-04 RX ADMIN — LEVOTHYROXINE SODIUM 100 MCG: 0.1 TABLET ORAL at 08:24

## 2025-09-04 RX ADMIN — WATER 1000 MG: 1 INJECTION INTRAMUSCULAR; INTRAVENOUS; SUBCUTANEOUS at 01:45

## 2025-09-04 RX ADMIN — PRIMIDONE 50 MG: 50 TABLET ORAL at 08:24

## 2025-09-04 RX ADMIN — PANTOPRAZOLE SODIUM 40 MG: 40 TABLET, DELAYED RELEASE ORAL at 05:24

## 2025-09-04 RX ADMIN — SODIUM CHLORIDE, PRESERVATIVE FREE 10 ML: 5 INJECTION INTRAVENOUS at 08:25

## 2025-09-04 RX ADMIN — ACETAMINOPHEN 1000 MG: 500 TABLET ORAL at 01:44

## 2025-09-04 RX ADMIN — MAGNESIUM SULFATE HEPTAHYDRATE 2000 MG: 40 INJECTION, SOLUTION INTRAVENOUS at 09:52

## 2025-09-04 RX ADMIN — PRIMIDONE 50 MG: 50 TABLET ORAL at 16:22

## 2025-09-04 RX ADMIN — TRAZODONE HYDROCHLORIDE 50 MG: 50 TABLET ORAL at 21:14

## 2025-09-04 ASSESSMENT — PAIN SCALES - GENERAL
PAINLEVEL_OUTOF10: 5
PAINLEVEL_OUTOF10: 0

## 2025-09-04 ASSESSMENT — PAIN - FUNCTIONAL ASSESSMENT: PAIN_FUNCTIONAL_ASSESSMENT: 0-10

## 2025-09-04 ASSESSMENT — PAIN DESCRIPTION - DESCRIPTORS: DESCRIPTORS: ACHING

## 2025-09-04 ASSESSMENT — PAIN DESCRIPTION - LOCATION: LOCATION: HEAD

## 2025-09-05 VITALS
RESPIRATION RATE: 15 BRPM | SYSTOLIC BLOOD PRESSURE: 145 MMHG | BODY MASS INDEX: 18.77 KG/M2 | HEART RATE: 59 BPM | DIASTOLIC BLOOD PRESSURE: 67 MMHG | OXYGEN SATURATION: 97 % | TEMPERATURE: 97.8 F | HEIGHT: 66 IN | WEIGHT: 116.8 LBS

## 2025-09-05 LAB
ANION GAP SERPL CALCULATED.3IONS-SCNC: 10 MMOL/L (ref 3–16)
BACTERIA BLD CULT ORG #2: NORMAL
BACTERIA BLD CULT: NORMAL
BUN SERPL-MCNC: 13 MG/DL (ref 7–20)
CALCIUM SERPL-MCNC: 8.5 MG/DL (ref 8.3–10.6)
CHLORIDE SERPL-SCNC: 108 MMOL/L (ref 99–110)
CO2 SERPL-SCNC: 23 MMOL/L (ref 21–32)
CREAT SERPL-MCNC: 0.6 MG/DL (ref 0.6–1.2)
GFR SERPLBLD CREATININE-BSD FMLA CKD-EPI: >90 ML/MIN/{1.73_M2}
GLUCOSE BLD-MCNC: 112 MG/DL (ref 70–99)
GLUCOSE BLD-MCNC: 220 MG/DL (ref 70–99)
GLUCOSE SERPL-MCNC: 120 MG/DL (ref 70–99)
MAGNESIUM SERPL-MCNC: 2.05 MG/DL (ref 1.8–2.4)
PERFORMED ON: ABNORMAL
PERFORMED ON: ABNORMAL
PHOSPHATE SERPL-MCNC: 2.9 MG/DL (ref 2.5–4.9)
POTASSIUM SERPL-SCNC: 3.5 MMOL/L (ref 3.5–5.1)
SODIUM SERPL-SCNC: 141 MMOL/L (ref 136–145)

## 2025-09-05 PROCEDURE — 6360000002 HC RX W HCPCS: Performed by: NURSE PRACTITIONER

## 2025-09-05 PROCEDURE — 80048 BASIC METABOLIC PNL TOTAL CA: CPT

## 2025-09-05 PROCEDURE — 83735 ASSAY OF MAGNESIUM: CPT

## 2025-09-05 PROCEDURE — 2500000003 HC RX 250 WO HCPCS: Performed by: NURSE PRACTITIONER

## 2025-09-05 PROCEDURE — 2580000003 HC RX 258: Performed by: NURSE PRACTITIONER

## 2025-09-05 PROCEDURE — 6370000000 HC RX 637 (ALT 250 FOR IP): Performed by: NURSE PRACTITIONER

## 2025-09-05 PROCEDURE — 84100 ASSAY OF PHOSPHORUS: CPT

## 2025-09-05 PROCEDURE — 36415 COLL VENOUS BLD VENIPUNCTURE: CPT

## 2025-09-05 RX ORDER — CEFUROXIME AXETIL 500 MG/1
500 TABLET ORAL 2 TIMES DAILY
Qty: 8 TABLET | Refills: 0 | Status: SHIPPED | OUTPATIENT
Start: 2025-09-05 | End: 2025-09-09

## 2025-09-05 RX ORDER — AZITHROMYCIN 500 MG/1
500 TABLET, FILM COATED ORAL ONCE
Qty: 1 TABLET | Refills: 0 | Status: SHIPPED | OUTPATIENT
Start: 2025-09-06 | End: 2025-09-06

## 2025-09-05 RX ADMIN — ACETAMINOPHEN 650 MG: 325 TABLET ORAL at 02:56

## 2025-09-05 RX ADMIN — PRIMIDONE 50 MG: 50 TABLET ORAL at 08:42

## 2025-09-05 RX ADMIN — AZITHROMYCIN MONOHYDRATE 500 MG: 500 INJECTION, POWDER, LYOPHILIZED, FOR SOLUTION INTRAVENOUS at 02:24

## 2025-09-05 RX ADMIN — SODIUM CHLORIDE, PRESERVATIVE FREE 10 ML: 5 INJECTION INTRAVENOUS at 08:41

## 2025-09-05 RX ADMIN — WATER 1000 MG: 1 INJECTION INTRAMUSCULAR; INTRAVENOUS; SUBCUTANEOUS at 02:21

## 2025-09-05 RX ADMIN — ATORVASTATIN CALCIUM 40 MG: 40 TABLET, FILM COATED ORAL at 08:42

## 2025-09-05 RX ADMIN — METOPROLOL SUCCINATE 25 MG: 25 TABLET, EXTENDED RELEASE ORAL at 08:41

## 2025-09-05 RX ADMIN — LEVOTHYROXINE SODIUM 100 MCG: 0.1 TABLET ORAL at 08:41

## 2025-09-05 RX ADMIN — PANTOPRAZOLE SODIUM 40 MG: 40 TABLET, DELAYED RELEASE ORAL at 06:52

## 2025-09-05 RX ADMIN — INSULIN LISPRO 1 UNITS: 100 INJECTION, SOLUTION INTRAVENOUS; SUBCUTANEOUS at 11:18

## 2025-09-05 ASSESSMENT — PAIN SCALES - GENERAL
PAINLEVEL_OUTOF10: 5
PAINLEVEL_OUTOF10: 4
PAINLEVEL_OUTOF10: 0
PAINLEVEL_OUTOF10: 0

## 2025-09-05 ASSESSMENT — PAIN - FUNCTIONAL ASSESSMENT
PAIN_FUNCTIONAL_ASSESSMENT: 0-10
PAIN_FUNCTIONAL_ASSESSMENT: PREVENTS OR INTERFERES SOME ACTIVE ACTIVITIES AND ADLS
PAIN_FUNCTIONAL_ASSESSMENT: 0-10

## 2025-09-05 ASSESSMENT — PAIN DESCRIPTION - LOCATION: LOCATION: ARM

## 2025-09-05 ASSESSMENT — PAIN DESCRIPTION - ORIENTATION: ORIENTATION: LEFT

## 2025-09-05 ASSESSMENT — PAIN DESCRIPTION - DESCRIPTORS: DESCRIPTORS: ACHING
